# Patient Record
Sex: MALE
[De-identification: names, ages, dates, MRNs, and addresses within clinical notes are randomized per-mention and may not be internally consistent; named-entity substitution may affect disease eponyms.]

---

## 2017-03-23 ENCOUNTER — HOSPITAL ENCOUNTER (OUTPATIENT)
Dept: HOSPITAL 42 - ENDO | Age: 72
Discharge: HOME | End: 2017-03-23
Payer: SELF-PAY

## 2017-03-23 VITALS — OXYGEN SATURATION: 96 %

## 2017-03-23 VITALS
SYSTOLIC BLOOD PRESSURE: 136 MMHG | HEART RATE: 62 BPM | TEMPERATURE: 97.7 F | DIASTOLIC BLOOD PRESSURE: 74 MMHG | RESPIRATION RATE: 16 BRPM

## 2017-03-23 VITALS — BODY MASS INDEX: 34.3 KG/M2

## 2017-03-23 DIAGNOSIS — C7A.010: Primary | ICD-10-CM

## 2017-03-23 LAB
ADD MANUAL DIFF?: NO
ALBUMIN/GLOB SERPL: 1.3 {RATIO} (ref 1.1–1.8)
ALP SERPL-CCNC: 135 U/L (ref 38–133)
ALT SERPL-CCNC: 23 U/L (ref 7–56)
APTT BLD: 28.1 SECONDS (ref 23.7–30.8)
AST SERPL-CCNC: 24 U/L (ref 15–59)
BASOPHILS # BLD AUTO: 0.01 K/MM3 (ref 0–2)
BASOPHILS NFR BLD: 0.2 % (ref 0–3)
BILIRUB SERPL-MCNC: 0.8 MG/DL (ref 0.2–1.3)
BUN SERPL-MCNC: 13 MG/DL (ref 7–21)
CALCIUM SERPL-MCNC: 9.1 MG/DL (ref 8.4–10.5)
CHLORIDE SERPL-SCNC: 104 MMOL/L (ref 98–107)
CO2 SERPL-SCNC: 29 MMOL/L (ref 21–33)
EOSINOPHIL # BLD: 0.2 10*3/UL (ref 0–0.7)
EOSINOPHIL NFR BLD: 5.6 % (ref 1.5–5)
ERYTHROCYTE [DISTWIDTH] IN BLOOD BY AUTOMATED COUNT: 13.5 % (ref 11.5–14.5)
GLOBULIN SER-MCNC: 3.1 GM/DL
GLUCOSE SERPL-MCNC: 98 MG/DL (ref 70–110)
GRANULOCYTES # BLD: 2.12 10*3/UL (ref 1.4–6.5)
GRANULOCYTES NFR BLD: 51.3 % (ref 50–68)
HCT VFR BLD CALC: 46 % (ref 42–52)
INR PPP: 0.97 (ref 0.93–1.08)
LYMPHOCYTES # BLD: 1.5 10*3/UL (ref 1.2–3.4)
LYMPHOCYTES NFR BLD AUTO: 35.4 % (ref 22–35)
MCH RBC QN AUTO: 29.3 PG (ref 25–35)
MCHC RBC AUTO-ENTMCNC: 33.3 G/DL (ref 31–37)
MCV RBC AUTO: 88.1 FL (ref 80–105)
MONOCYTES # BLD AUTO: 0.3 10*3/UL (ref 0.1–0.6)
MONOCYTES NFR BLD: 7.5 % (ref 1–6)
PLATELET # BLD: 222 10^3/UL (ref 120–450)
PMV BLD AUTO: 10.2 FL (ref 7–11)
POTASSIUM SERPL-SCNC: 4.2 MMOL/L (ref 3.6–5)
PROT SERPL-MCNC: 7 G/DL (ref 5.8–8.3)
SODIUM SERPL-SCNC: 143 MMOL/L (ref 132–148)
WBC # BLD AUTO: 4.1 10^3/UL (ref 4.5–11)

## 2017-03-23 PROCEDURE — 43254 EGD ENDO MUCOSAL RESECTION: CPT

## 2017-03-23 PROCEDURE — 36415 COLL VENOUS BLD VENIPUNCTURE: CPT

## 2017-03-23 PROCEDURE — 85025 COMPLETE CBC W/AUTO DIFF WBC: CPT

## 2017-03-23 PROCEDURE — 85610 PROTHROMBIN TIME: CPT

## 2017-03-23 PROCEDURE — 85730 THROMBOPLASTIN TIME PARTIAL: CPT

## 2017-03-23 PROCEDURE — 80053 COMPREHEN METABOLIC PANEL: CPT

## 2017-03-23 PROCEDURE — 88305 TISSUE EXAM BY PATHOLOGIST: CPT

## 2017-03-23 PROCEDURE — 88342 IMHCHEM/IMCYTCHM 1ST ANTB: CPT

## 2017-05-18 ENCOUNTER — HOSPITAL ENCOUNTER (OUTPATIENT)
Dept: HOSPITAL 42 - ENDO | Age: 72
Discharge: HOME | End: 2017-05-18
Payer: SELF-PAY

## 2017-05-18 VITALS — BODY MASS INDEX: 34.3 KG/M2

## 2017-05-18 DIAGNOSIS — C7A.010: Primary | ICD-10-CM

## 2017-05-18 DIAGNOSIS — Z88.8: ICD-10-CM

## 2017-05-18 DIAGNOSIS — K44.9: ICD-10-CM

## 2017-05-18 DIAGNOSIS — Z87.81: ICD-10-CM

## 2017-05-18 DIAGNOSIS — K29.50: ICD-10-CM

## 2017-05-18 PROCEDURE — 43251 EGD REMOVE LESION SNARE: CPT

## 2017-05-18 PROCEDURE — 43254 EGD ENDO MUCOSAL RESECTION: CPT

## 2017-05-18 PROCEDURE — 43239 EGD BIOPSY SINGLE/MULTIPLE: CPT

## 2017-05-18 PROCEDURE — 88305 TISSUE EXAM BY PATHOLOGIST: CPT

## 2017-05-18 PROCEDURE — 88342 IMHCHEM/IMCYTCHM 1ST ANTB: CPT

## 2017-08-21 ENCOUNTER — HOSPITAL ENCOUNTER (OUTPATIENT)
Dept: HOSPITAL 31 - C.ENDO | Age: 72
Discharge: HOME | End: 2017-08-21
Attending: INTERNAL MEDICINE
Payer: COMMERCIAL

## 2017-08-21 VITALS — RESPIRATION RATE: 12 BRPM | TEMPERATURE: 98.7 F

## 2017-08-21 VITALS — HEART RATE: 53 BPM | OXYGEN SATURATION: 100 %

## 2017-08-21 VITALS — BODY MASS INDEX: 33.2 KG/M2

## 2017-08-21 VITALS — DIASTOLIC BLOOD PRESSURE: 78 MMHG | SYSTOLIC BLOOD PRESSURE: 131 MMHG

## 2017-08-21 DIAGNOSIS — Z85.060: ICD-10-CM

## 2017-08-21 DIAGNOSIS — Z08: Primary | ICD-10-CM

## 2017-08-21 DIAGNOSIS — I89.0: ICD-10-CM

## 2017-08-21 DIAGNOSIS — K44.9: ICD-10-CM

## 2017-08-21 DIAGNOSIS — K29.70: ICD-10-CM

## 2017-08-21 PROCEDURE — 43239 EGD BIOPSY SINGLE/MULTIPLE: CPT

## 2017-08-21 PROCEDURE — 88305 TISSUE EXAM BY PATHOLOGIST: CPT

## 2017-08-21 PROCEDURE — 88342 IMHCHEM/IMCYTCHM 1ST ANTB: CPT

## 2017-08-21 PROCEDURE — 88313 SPECIAL STAINS GROUP 2: CPT

## 2017-08-21 NOTE — CP.SDSHP
Same Day Surgery H & P





- History


Proposed Procedure: EGD


Pre-Op Diagnosis: H/o duodenal carcinoid





- Previous Medical/Surgical History


Previous Surgical History: EGD EMR





- Allergies


Allergies: 


Allergies





iodine Allergy (Severe, Verified 05/09/17 10:44)


 ANAPHYLAXIS











- Physical Exam


General Appearance: nl


Vital Signs: 


 Vital Signs











  08/21/17





  08:54


 


Temperature 97.8 F


 


Pulse Rate 56 L


 


Respiratory 19





Rate 


 


Blood Pressure 126/77


 


O2 Sat by Pulse 97





Oximetry 











Mental Status: Alert & Oriented x3


Neuro: WNL


Heart: WNL


Lungs: WNL


GI: WNL





- {Optional Preform as Required}


Abdomen: WNL





- Impression


Impression: H/o duodenal carcinoid s/p EMR, f/u egd


Pt. Evaluated Today:Candidate for Anesthesia & Procedure: Yes





- Date & Time


Date: 08/21/17


Time: 10:20





Short Stay Discharge





- Short Stay Discharge


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


Disposition: HOME/ ROUTINE

## 2018-04-02 ENCOUNTER — HOSPITAL ENCOUNTER (OUTPATIENT)
Dept: HOSPITAL 31 - C.ENDO | Age: 73
Discharge: HOME | End: 2018-04-02
Payer: COMMERCIAL

## 2018-04-02 VITALS — SYSTOLIC BLOOD PRESSURE: 128 MMHG | HEART RATE: 68 BPM | DIASTOLIC BLOOD PRESSURE: 75 MMHG | RESPIRATION RATE: 20 BRPM

## 2018-04-02 VITALS — TEMPERATURE: 97 F

## 2018-04-02 VITALS — BODY MASS INDEX: 33.2 KG/M2

## 2018-04-02 VITALS — OXYGEN SATURATION: 100 %

## 2018-04-02 DIAGNOSIS — D3A.010: Primary | ICD-10-CM

## 2018-04-02 DIAGNOSIS — D12.2: ICD-10-CM

## 2018-04-02 DIAGNOSIS — Z86.010: ICD-10-CM

## 2018-04-02 PROCEDURE — 88305 TISSUE EXAM BY PATHOLOGIST: CPT

## 2018-04-02 PROCEDURE — 45380 COLONOSCOPY AND BIOPSY: CPT

## 2018-04-23 ENCOUNTER — HOSPITAL ENCOUNTER (OUTPATIENT)
Dept: HOSPITAL 31 - C.ENDO | Age: 73
Discharge: HOME | End: 2018-04-23
Attending: INTERNAL MEDICINE
Payer: COMMERCIAL

## 2018-04-23 VITALS
HEART RATE: 75 BPM | OXYGEN SATURATION: 98 % | RESPIRATION RATE: 16 BRPM | DIASTOLIC BLOOD PRESSURE: 83 MMHG | SYSTOLIC BLOOD PRESSURE: 120 MMHG

## 2018-04-23 VITALS — BODY MASS INDEX: 33.2 KG/M2

## 2018-04-23 VITALS — TEMPERATURE: 97 F

## 2018-04-23 DIAGNOSIS — D12.3: ICD-10-CM

## 2018-04-23 DIAGNOSIS — D12.2: ICD-10-CM

## 2018-04-23 DIAGNOSIS — D17.5: ICD-10-CM

## 2018-04-23 DIAGNOSIS — K64.8: ICD-10-CM

## 2018-04-23 DIAGNOSIS — Z86.010: Primary | ICD-10-CM

## 2018-04-23 PROCEDURE — 45385 COLONOSCOPY W/LESION REMOVAL: CPT

## 2018-04-23 PROCEDURE — 45380 COLONOSCOPY AND BIOPSY: CPT

## 2018-04-23 PROCEDURE — 88305 TISSUE EXAM BY PATHOLOGIST: CPT

## 2018-04-23 PROCEDURE — 45381 COLONOSCOPY SUBMUCOUS NJX: CPT

## 2018-05-31 ENCOUNTER — HOSPITAL ENCOUNTER (INPATIENT)
Dept: HOSPITAL 31 - C.SDS | Age: 73
LOS: 18 days | Discharge: HOME | DRG: 329 | End: 2018-06-18
Attending: SURGERY | Admitting: SURGERY
Payer: MEDICAID

## 2018-05-31 VITALS — BODY MASS INDEX: 33.2 KG/M2

## 2018-05-31 DIAGNOSIS — K63.0: ICD-10-CM

## 2018-05-31 DIAGNOSIS — K55.1: ICD-10-CM

## 2018-05-31 DIAGNOSIS — D12.2: Primary | ICD-10-CM

## 2018-05-31 DIAGNOSIS — N17.0: ICD-10-CM

## 2018-05-31 DIAGNOSIS — R57.8: ICD-10-CM

## 2018-05-31 DIAGNOSIS — E87.0: ICD-10-CM
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
## 2018-05-31 DIAGNOSIS — K42.9: ICD-10-CM

## 2018-05-31 DIAGNOSIS — E66.9: ICD-10-CM

## 2018-05-31 DIAGNOSIS — K91.30: ICD-10-CM

## 2018-05-31 DIAGNOSIS — E86.0: ICD-10-CM

## 2018-05-31 DIAGNOSIS — I95.81: ICD-10-CM

## 2018-05-31 DIAGNOSIS — K21.9: ICD-10-CM

## 2018-05-31 DIAGNOSIS — G47.33: ICD-10-CM

## 2018-05-31 DIAGNOSIS — K63.2: ICD-10-CM

## 2018-05-31 PROCEDURE — 0DQV4ZZ REPAIR MESENTERY, PERCUTANEOUS ENDOSCOPIC APPROACH: ICD-10-PCS | Performed by: SURGERY

## 2018-05-31 PROCEDURE — 0W9F40Z DRAINAGE OF ABDOMINAL WALL WITH DRAINAGE DEVICE, PERCUTANEOUS ENDOSCOPIC APPROACH: ICD-10-PCS | Performed by: SURGERY

## 2018-05-31 PROCEDURE — 3E0336Z INTRODUCTION OF NUTRITIONAL SUBSTANCE INTO PERIPHERAL VEIN, PERCUTANEOUS APPROACH: ICD-10-PCS | Performed by: SURGERY

## 2018-05-31 PROCEDURE — 0DTF4ZZ RESECTION OF RIGHT LARGE INTESTINE, PERCUTANEOUS ENDOSCOPIC APPROACH: ICD-10-PCS | Performed by: SURGERY

## 2018-05-31 RX ADMIN — BUPIVACAINE HYDROCHLORIDE ONE ML: 2.5 INJECTION, SOLUTION EPIDURAL; INFILTRATION; INTRACAUDAL; PERINEURAL at 08:09

## 2018-05-31 RX ADMIN — BUPIVACAINE HYDROCHLORIDE ONE ML: 2.5 INJECTION, SOLUTION EPIDURAL; INFILTRATION; INTRACAUDAL; PERINEURAL at 12:56

## 2018-05-31 RX ADMIN — HYDROMORPHONE HYDROCHLORIDE PRN MG: 1 INJECTION, SOLUTION INTRAMUSCULAR; INTRAVENOUS; SUBCUTANEOUS at 13:48

## 2018-05-31 RX ADMIN — HYDROMORPHONE HYDROCHLORIDE PRN MG: 1 INJECTION, SOLUTION INTRAMUSCULAR; INTRAVENOUS; SUBCUTANEOUS at 17:12

## 2018-05-31 RX ADMIN — WATER SCH MLS: 1 INJECTION INTRAMUSCULAR; INTRAVENOUS; SUBCUTANEOUS at 17:10

## 2018-05-31 RX ADMIN — HYDROMORPHONE HYDROCHLORIDE PRN MG: 1 INJECTION, SOLUTION INTRAMUSCULAR; INTRAVENOUS; SUBCUTANEOUS at 15:55

## 2018-05-31 RX ADMIN — HYDROMORPHONE HYDROCHLORIDE PRN MG: 1 INJECTION, SOLUTION INTRAMUSCULAR; INTRAVENOUS; SUBCUTANEOUS at 14:50

## 2018-05-31 NOTE — PCM.SURG1
Surgeon's Initial Post Op Note





- Surgeon's Notes


Surgeon: Dr. Villalobos


Assistant: Dr. Medina PGY3,  PGY1, Matilda OMS3


Type of Anesthesia: General Endo


Pre-Operative Diagnosis: Incomplete polypectomy of right colon. Tubular Adenoma


Operative Findings: Tatooed mass on right colon seen on specimen. Duodenum 

visualized during dissection. Primary anastamosis with ileum and transverse 

colon with no leak detected. for details see op note


Post-Operative Diagnosis: as above


Operation Performed: Laparoscopic Right gissel-colectomy


Specimen/Specimens Removed: Right colon


Estimated Blood Loss: EBL {In ML}: 150


Blood Products Given: N/A


Drains Used: Tim


Post-Op Condition: Fair


Date of Surgery/Procedure: 05/31/18


Time of Surgery/Procedure: 08:00

## 2018-06-01 LAB
BASOPHILS # BLD AUTO: 0 K/UL (ref 0–0.2)
BASOPHILS NFR BLD: 0.1 % (ref 0–2)
BUN SERPL-MCNC: 11 MG/DL (ref 9–20)
CALCIUM SERPL-MCNC: 7.8 MG/DL (ref 8.6–10.4)
EOSINOPHIL # BLD AUTO: 0 K/UL (ref 0–0.7)
EOSINOPHIL NFR BLD: 0 % (ref 0–4)
ERYTHROCYTE [DISTWIDTH] IN BLOOD BY AUTOMATED COUNT: 14.4 % (ref 11.5–14.5)
GFR NON-AFRICAN AMERICAN: > 60
HGB BLD-MCNC: 14.2 G/DL (ref 12–18)
LYMPHOCYTE: 8 % (ref 20–40)
LYMPHOCYTES # BLD AUTO: 0.9 K/UL (ref 1–4.3)
LYMPHOCYTES NFR BLD AUTO: 9.1 % (ref 20–40)
MCH RBC QN AUTO: 29.2 PG (ref 27–31)
MCHC RBC AUTO-ENTMCNC: 34.4 G/DL (ref 33–37)
MCV RBC AUTO: 85 FL (ref 80–94)
MONOCYTE: 5 % (ref 0–10)
MONOCYTES # BLD: 0.7 K/UL (ref 0–0.8)
MONOCYTES NFR BLD: 6.6 % (ref 0–10)
NEUTROPHILS # BLD: 8.5 K/UL (ref 1.8–7)
NEUTROPHILS NFR BLD AUTO: 84.2 % (ref 50–75)
NEUTROPHILS NFR BLD AUTO: 85 % (ref 50–75)
NEUTS BAND NFR BLD: 2 % (ref 0–2)
NRBC BLD AUTO-RTO: 0.1 % (ref 0–2)
PLATELET # BLD EST: NORMAL 10*3/UL
PLATELET # BLD: 170 K/UL (ref 130–400)
PMV BLD AUTO: 8.9 FL (ref 7.2–11.7)
RBC # BLD AUTO: 4.87 MIL/UL (ref 4.4–5.9)
TOTAL CELLS COUNTED BLD: 100
WBC # BLD AUTO: 10.1 K/UL (ref 4.8–10.8)

## 2018-06-01 RX ADMIN — WATER SCH MLS/HR: 1 INJECTION INTRAMUSCULAR; INTRAVENOUS; SUBCUTANEOUS at 08:44

## 2018-06-01 RX ADMIN — CEFAZOLIN SODIUM SCH MLS/HR: 2 SOLUTION INTRAVENOUS at 00:04

## 2018-06-01 RX ADMIN — CEFAZOLIN SODIUM SCH MLS/HR: 2 SOLUTION INTRAVENOUS at 16:33

## 2018-06-01 RX ADMIN — WATER SCH MLS/HR: 1 INJECTION INTRAMUSCULAR; INTRAVENOUS; SUBCUTANEOUS at 00:49

## 2018-06-01 RX ADMIN — POTASSIUM CHLORIDE, DEXTROSE MONOHYDRATE AND SODIUM CHLORIDE SCH: 150; 5; 450 INJECTION, SOLUTION INTRAVENOUS at 17:01

## 2018-06-01 RX ADMIN — POTASSIUM CHLORIDE, DEXTROSE MONOHYDRATE AND SODIUM CHLORIDE SCH MLS/HR: 150; 5; 450 INJECTION, SOLUTION INTRAVENOUS at 21:10

## 2018-06-01 RX ADMIN — CEFAZOLIN SODIUM SCH MLS/HR: 2 SOLUTION INTRAVENOUS at 06:20

## 2018-06-01 RX ADMIN — POTASSIUM CHLORIDE, DEXTROSE MONOHYDRATE AND SODIUM CHLORIDE SCH MLS/HR: 150; 5; 450 INJECTION, SOLUTION INTRAVENOUS at 10:11

## 2018-06-01 NOTE — CP.PCM.PN
<Tiburcio Dietrich D - Last Filed: 06/01/18 09:31>





Subjective





- Date & Time of Evaluation


Date of Evaluation: 06/01/18


Time of Evaluation: 09:31





- Subjective


Subjective: 





SURGERY NOTE FOR DR. EVANS





72M seen and examined at bedside. Patient states pain is controlled, admits to 

mild nausea, denies vomiting denies fevers/chills. He denies flatus/denies BM. 





Objective





- Vital Signs/Intake and Output


Vital Signs (last 24 hours): 


 











Temp Pulse Resp BP Pulse Ox


 


 99.4 F   108 H  20   149/83   93 L


 


 06/01/18 07:03  06/01/18 07:03  06/01/18 07:03  06/01/18 07:03  06/01/18 07:03








Intake and Output: 


 











 06/01/18 06/01/18





 06:59 18:59


 


Output Total 715 


 


Balance -715 














- Medications


Medications: 


 Current Medications





Acetaminophen (Tylenol 325mg Tab)  650 mg PO Q6 PRN


   PRN Reason: Fever >100.4 F


Albuterol Sulfate (Albuterol 0.083% Inhal Sol (2.5 Mg/3 Ml) Ud)  2.5 mg INH 

ONCE PRN


   PRN Reason: Wheezing


Heparin Sodium (Porcine) (Heparin)  5,000 units SC Q8 [facility]


   Last Admin: 06/01/18 09:07 Dose:  5,000 units


Hydromorphone/Sodium Chloride (Dilaudid Pca)  6 mg IV Q4H PRN; Protocol


   PRN Reason: Pain, moderate (4-7)


   Last Admin: 06/01/18 06:11 Dose:  6 mg


Potassium Chloride/Dextrose/Sod Cl (Potassium Chl 20 Meq In D5-1/2ns)  1,000 

mls @ 125 mls/hr IV .Q8H [facility]


Lactated Ringer's (Lactated Ringer's)  1,000 mls @ 125 mls/hr IV .Q8H [facility]


   Last Admin: 06/01/18 05:30 Dose:  125 mls/hr


Cefazolin Sodium/Dextrose (Ancef Iv 2 Gm Duplex)  2 gm in 50 mls @ 100 mls/hr 

IVPB Q8H [facility]


   PRN Reason: Protocol


   Stop: 06/01/18 15:29


   Last Admin: 06/01/18 06:20 Dose:  100 mls/hr


Lidocaine (Lidoderm)  1 ea TD DAILY [facility]


   Last Admin: 06/01/18 09:05 Dose:  1 ea


Ondansetron HCl (Zofran Inj)  4 mg IV Q4 PRN


   PRN Reason: Nausea/Vomiting


   Last Admin: 06/01/18 09:15 Dose:  4 mg











- Labs


Labs: 


 





 06/01/18 06:51 





 06/01/18 06:51 











- Constitutional


Appears: Well, Non-toxic, No Acute Distress





- Respiratory Exam


Respiratory Exam: Clear to Ausculation Bilateral, NORMAL BREATHING PATTERN





- Cardiovascular Exam


Cardiovascular Exam: REGULAR RHYTHM, +S1, +S2





- GI/Abdominal Exam


GI & Abdominal Exam: Distended, Soft, Tenderness.  absent: Firm, Guarding, Rigid

, Rebound


Additional comments: 





incisions CDI


Drain 135cc since OR of serosanguinous fluid





- Extremities Exam


Extremities Exam: absent: Pedal Edema, Tenderness





- Neurological Exam


Neurological Exam: Alert, Awake





- Psychiatric Exam


Psychiatric exam: Normal Affect, Normal Mood





- Skin


Skin Exam: Dry, Intact, Normal Color, Warm





Assessment and Plan





- Assessment and Plan (Free Text)


Assessment: 





72M s/p Laparoscopic Right Hemicolectomy POD1 for tubular adenoma which was 

unable to be removed endoscopically POD#1


Plan: 





- NPO


- Pain control, keep PCA, Lidoderm around incisions


- Monitor Drain output


- DC ABX in PM


- Nebulizer treatments


- Encourage ambulation


- DC newby after ambulation


- SCD/HeparinSC





Discussed with Dr. Wilfredo Dietrich, PGY2





<Jas Evans - Last Filed: 06/01/18 10:26>





Objective





- Vital Signs/Intake and Output


Vital Signs (last 24 hours): 


 











Temp Pulse Resp BP Pulse Ox


 


 99.4 F   108 H  20   149/83   93 L


 


 06/01/18 07:03  06/01/18 07:03  06/01/18 07:03  06/01/18 07:03  06/01/18 07:03








Intake and Output: 


 











 06/01/18 06/01/18





 06:59 18:59


 


Output Total 715 


 


Balance -715 














- Medications


Medications: 


 Current Medications





Acetaminophen (Tylenol 325mg Tab)  650 mg PO Q6 PRN


   PRN Reason: Fever >100.4 F


Albuterol Sulfate (Albuterol 0.083% Inhal Sol (2.5 Mg/3 Ml) Ud)  2.5 mg INH 

ONCE PRN


   PRN Reason: Wheezing


Heparin Sodium (Porcine) (Heparin)  5,000 units SC Q8 [facility]


   Last Admin: 06/01/18 09:07 Dose:  5,000 units


Hydromorphone/Sodium Chloride (Dilaudid Pca)  6 mg IV Q4H PRN; Protocol


   PRN Reason: Pain, moderate (4-7)


   Last Admin: 06/01/18 06:11 Dose:  6 mg


Potassium Chloride/Dextrose/Sod Cl (Potassium Chl 20 Meq In D5-1/2ns)  1,000 

mls @ 125 mls/hr IV .Q8H KAT[facility]


Lactated Ringer's (Lactated Ringer's)  1,000 mls @ 125 mls/hr IV .Q8H [facility]


   Last Admin: 06/01/18 05:30 Dose:  125 mls/hr


Cefazolin Sodium/Dextrose (Ancef Iv 2 Gm Duplex)  2 gm in 50 mls @ 100 mls/hr 

IVPB Q8H KAT[facility]


   PRN Reason: Protocol


   Stop: 06/01/18 15:29


   Last Admin: 06/01/18 06:20 Dose:  100 mls/hr


Lidocaine (Lidoderm)  1 ea TD DAILY [facility]


   Last Admin: 06/01/18 09:05 Dose:  1 ea


Ondansetron HCl (Zofran Inj)  4 mg IV Q4 PRN


   PRN Reason: Nausea/Vomiting


   Last Admin: 06/01/18 09:15 Dose:  4 mg











- Labs


Labs: 


 





 06/01/18 06:51 





 06/01/18 06:51 











Assessment and Plan





- Assessment and Plan (Free Text)


Plan: 


Patient seen and examined at bedside with residents staff and agree with above. 

Would expect post-op ileus due to length of operation and bowel manipulation. 

Cont. NPO. NGT if continued nausea or vomiting. D/c newby later today when 

ambulating. Pain control. DVT prophylaxis HSQ and SCD. Periop Abx to finish 

today. IS and breathing treatments PRN.

## 2018-06-02 LAB
ALBUMIN SERPL-MCNC: 2.9 G/DL (ref 3.5–5)
ALBUMIN SERPL-MCNC: 3.2 G/DL (ref 3.5–5)
ALBUMIN/GLOB SERPL: 1.1 {RATIO} (ref 1–2.1)
ALBUMIN/GLOB SERPL: 1.1 {RATIO} (ref 1–2.1)
ALT SERPL-CCNC: 16 U/L (ref 21–72)
ALT SERPL-CCNC: 19 U/L (ref 21–72)
AST SERPL-CCNC: 23 U/L (ref 17–59)
AST SERPL-CCNC: 26 U/L (ref 17–59)
BASOPHILS # BLD AUTO: 0 K/UL (ref 0–0.2)
BASOPHILS # BLD AUTO: 0 K/UL (ref 0–0.2)
BASOPHILS NFR BLD: 0.2 % (ref 0–2)
BASOPHILS NFR BLD: 0.3 % (ref 0–2)
BUN SERPL-MCNC: 10 MG/DL (ref 9–20)
BUN SERPL-MCNC: 10 MG/DL (ref 9–20)
CALCIUM SERPL-MCNC: 7.9 MG/DL (ref 8.6–10.4)
CALCIUM SERPL-MCNC: 8.1 MG/DL (ref 8.6–10.4)
CK MB SERPL-MCNC: 0.95 NG/ML (ref 0–3.38)
CK MB SERPL-MCNC: 1.53 NG/ML (ref 0–3.38)
EOSINOPHIL # BLD AUTO: 0.1 K/UL (ref 0–0.7)
EOSINOPHIL # BLD AUTO: 0.1 K/UL (ref 0–0.7)
EOSINOPHIL NFR BLD: 0.7 % (ref 0–4)
EOSINOPHIL NFR BLD: 0.9 % (ref 0–4)
ERYTHROCYTE [DISTWIDTH] IN BLOOD BY AUTOMATED COUNT: 14.3 % (ref 11.5–14.5)
ERYTHROCYTE [DISTWIDTH] IN BLOOD BY AUTOMATED COUNT: 14.7 % (ref 11.5–14.5)
GFR NON-AFRICAN AMERICAN: > 60
GFR NON-AFRICAN AMERICAN: > 60
HGB BLD-MCNC: 13.7 G/DL (ref 12–18)
HGB BLD-MCNC: 14.4 G/DL (ref 12–18)
LYMPHOCYTE: 13 % (ref 20–40)
LYMPHOCYTE: 4 % (ref 20–40)
LYMPHOCYTES # BLD AUTO: 0.5 K/UL (ref 1–4.3)
LYMPHOCYTES # BLD AUTO: 1 K/UL (ref 1–4.3)
LYMPHOCYTES NFR BLD AUTO: 6 % (ref 20–40)
LYMPHOCYTES NFR BLD AUTO: 9.7 % (ref 20–40)
MCH RBC QN AUTO: 29.3 PG (ref 27–31)
MCH RBC QN AUTO: 29.9 PG (ref 27–31)
MCHC RBC AUTO-ENTMCNC: 33.9 G/DL (ref 33–37)
MCHC RBC AUTO-ENTMCNC: 34.5 G/DL (ref 33–37)
MCV RBC AUTO: 86.5 FL (ref 80–94)
MCV RBC AUTO: 86.6 FL (ref 80–94)
MONOCYTE: 3 % (ref 0–10)
MONOCYTE: 4 % (ref 0–10)
MONOCYTES # BLD: 0.5 K/UL (ref 0–0.8)
MONOCYTES # BLD: 0.5 K/UL (ref 0–0.8)
MONOCYTES NFR BLD: 5.1 % (ref 0–10)
MONOCYTES NFR BLD: 5.2 % (ref 0–10)
NEUTROPHILS # BLD: 7.8 K/UL (ref 1.8–7)
NEUTROPHILS # BLD: 8.4 K/UL (ref 1.8–7)
NEUTROPHILS NFR BLD AUTO: 83.9 % (ref 50–75)
NEUTROPHILS NFR BLD AUTO: 84 % (ref 50–75)
NEUTROPHILS NFR BLD AUTO: 88 % (ref 50–75)
NEUTROPHILS NFR BLD AUTO: 92 % (ref 50–75)
NRBC BLD AUTO-RTO: 0 % (ref 0–2)
NRBC BLD AUTO-RTO: 0 % (ref 0–2)
PLATELET # BLD EST: NORMAL 10*3/UL
PLATELET # BLD EST: NORMAL 10*3/UL
PLATELET # BLD: 177 K/UL (ref 130–400)
PLATELET # BLD: 198 K/UL (ref 130–400)
PMV BLD AUTO: 8.8 FL (ref 7.2–11.7)
PMV BLD AUTO: 9.1 FL (ref 7.2–11.7)
RBC # BLD AUTO: 4.58 MIL/UL (ref 4.4–5.9)
RBC # BLD AUTO: 4.9 MIL/UL (ref 4.4–5.9)
RBC MORPH BLD: NORMAL
TOTAL CELLS COUNTED BLD: 100
TOTAL CELLS COUNTED BLD: 100
WBC # BLD AUTO: 10 K/UL (ref 4.8–10.8)
WBC # BLD AUTO: 8.9 K/UL (ref 4.8–10.8)

## 2018-06-02 RX ADMIN — MAGNESIUM SULFATE IN DEXTROSE SCH MLS/HR: 10 INJECTION, SOLUTION INTRAVENOUS at 00:30

## 2018-06-02 RX ADMIN — POTASSIUM CHLORIDE, DEXTROSE MONOHYDRATE AND SODIUM CHLORIDE SCH: 150; 5; 450 INJECTION, SOLUTION INTRAVENOUS at 09:08

## 2018-06-02 RX ADMIN — POTASSIUM CHLORIDE, DEXTROSE MONOHYDRATE AND SODIUM CHLORIDE SCH MLS/HR: 150; 5; 450 INJECTION, SOLUTION INTRAVENOUS at 08:13

## 2018-06-02 RX ADMIN — MAGNESIUM SULFATE IN DEXTROSE SCH MLS/HR: 10 INJECTION, SOLUTION INTRAVENOUS at 00:00

## 2018-06-02 RX ADMIN — POTASSIUM CHLORIDE, DEXTROSE MONOHYDRATE AND SODIUM CHLORIDE SCH: 150; 5; 450 INJECTION, SOLUTION INTRAVENOUS at 00:10

## 2018-06-02 NOTE — RAD
Chest x-ray single frontal view 



History: Tachycardia. 



Comparison: 05/22/2018 



Findings: 



Biapical pleural thickening with upper lobe granulomatous changes. 



Diffuse increased interstitial lung markings with some scattered 

nodular densities throughout both lungs. 



Prominent linear consolidative changes at both lung bases which may 

represent atelectasis and or infiltrate. 



Enlarged ectatic aorta. 



Cardiomegaly. 



Degenerative changes in the spine and shoulders. 



Impression: 



Biapical pleural thickening with upper lobe granulomatous changes. 



Diffuse increased interstitial lung markings with some scattered 

nodular densities throughout both lungs. 



Prominent linear consolidative changes at both lung bases which may 

represent atelectasis and or infiltrate. 



Enlarged ectatic aorta. 



Cardiomegaly.

## 2018-06-02 NOTE — CP.PCM.CON
History of Present Illness





- History of Present Illness


History of Present Illness: 





72-year-old  male postoperatively, hemicolectomy, developed rapid heart 

rate and cardiology consult was requested.


On further review of the chart patient was cleared by the medical clinic as 

patient has no cardiac history.  EKG preop shows some Q waves in inferior leads 

with sinus bradycardia of 56.  No investigation was done concerning low heart 

rate.


Postoperatively patient heart rate is was in 120 sinus tachycardia with stable 

blood pressure.  EKG showed no change from previous one with small Q's in 

inferior leads with sinus tachycardia.


Patient has no history of diabetes coronary artery disease or MI in the past.


Physical examination patient is not in acute distress


Heart S1-S2 normal.  Currently the heart rate is normal after previously given 

IV beta-blocker.  There is no murmurs or gallops.


Lungs are clear to auscultation percussion


Abdomen is postop with slight distention


Extremity is no edema.


Another repeat EKG was done incorrectly with reversal of leads.  Cardiac 

enzymes 2 are negative.


Plan


Continue observation on telemetry.  If the heart rate is more than 120 can give 

when necessary IV Lopressor if the blood pressure is more than 110 systolic.


Currently patient does not need any further  cardiac intervention





Past Patient History





- Past Medical History & Family History


Past Medical History?: Yes





- Past Social History


Smoking Status: Never Smoked





- CARDIAC


Hx Cardiac Disorders: Yes





- PULMONARY


Hx Respiratory Disorders: Yes


Hx Sleep Apnea: Yes (POSITIVE STUDY, DOES NOT HAVE C PAP MACHINE)





- NEUROLOGICAL


Hx Neurological Disorder: No





- HEENT


Hx HEENT Problems: No





- RENAL


Hx Chronic Kidney Disease: No





- ENDOCRINE/METABOLIC


Hx Endocrine Disorders: No





- HEMATOLOGICAL/ONCOLOGICAL


Hx Blood Disorders: No





- INTEGUMENTARY


Hx Dermatological Problems: No





- MUSCULOSKELETAL/RHEUMATOLOGICAL


Hx Musculoskeletal Disorders: No


Hx Falls: No





- GASTROINTESTINAL


Hx Gastrointestinal Disorders: Yes


Hx Gastritis: Yes


Other/Comment: HX DUODENAL CARCINOID





- GENITOURINARY/GYNECOLOGICAL


Hx Genitourinary Disorders: No





- PSYCHIATRIC


Hx Psychophysiologic Disorder: No


Hx Substance Use: No





- SURGICAL HISTORY


Hx Surgeries: Yes


Hx Orthopedic Surgery: Yes (ORIF LEFT ELBOW W PLATE PLACEMENT)


Other/Comment: HX: COLON POLYPECTOMY





- ANESTHESIA


Hx Anesthesia: Yes


Hx Anesthesia Reactions: No


Hx Malignant Hyperthermia: No


Has any member of the family had a problem w/ anesthesia?: No





Meds


Allergies/Adverse Reactions: 


 Allergies











Allergy/AdvReac Type Severity Reaction Status Date / Time


 


iodine Allergy Severe ANAPHYLAXIS Verified 04/23/18 09:17














- Medications


Medications: 


 Current Medications





Acetaminophen (Tylenol 325mg Tab)  650 mg PO Q6 PRN


   PRN Reason: Fever >100.4 F


Albuterol Sulfate (Albuterol 0.083% Inhal Sol (2.5 Mg/3 Ml) Ud)  2.5 mg INH 

ONCE PRN


   PRN Reason: Wheezing


Famotidine (Pepcid)  20 mg IVP Q12 [facility]


   Last Admin: 06/02/18 09:24 Dose:  20 mg


Heparin Sodium (Porcine) (Heparin)  5,000 units SC Q8 [facility]


   Last Admin: 06/02/18 13:40 Dose:  5,000 units


Hydromorphone/Sodium Chloride (Dilaudid Pca)  6 mg IV Q4H PRN; Protocol


   PRN Reason: Pain, moderate (4-7)


   Last Admin: 06/02/18 09:25 Dose:  6 mg


Potassium Chloride/Dextrose/Sod Cl (Potassium Chl 20 Meq In D5-1/2ns)  1,000 

mls @ 100 mls/hr IV .Q10H [facility]


   Last Admin: 06/02/18 09:08 Dose:  Not Given


Lidocaine (Lidoderm)  1 ea TD DAILY [facility]


   Last Admin: 06/02/18 09:26 Dose:  Not Given


Ondansetron HCl (Zofran Inj)  4 mg IV Q4 PRN


   PRN Reason: Nausea/Vomiting


   Last Admin: 06/01/18 17:38 Dose:  4 mg











Results





- Vital Signs


Recent Vital Signs: 


 Last Vital Signs











Temp  98.6 F   06/02/18 10:15


 


Pulse  90   06/02/18 12:00


 


Resp  20   06/02/18 08:27


 


BP  130/79   06/02/18 08:27


 


Pulse Ox  97   06/02/18 08:27














- Labs


Result Diagrams: 


 06/03/18 03:04





 06/03/18 03:04


Labs: 


 Laboratory Results - last 24 hr











  06/02/18 06/02/18





  01:06 08:10


 


WBC   8.9


 


RBC   4.58


 


Hgb   13.7


 


Hct   39.6


 


MCV   86.5


 


MCH   29.9


 


MCHC   34.5


 


RDW   14.3


 


Plt Count   177


 


MPV   9.1


 


Neut % (Auto)   88.0 H


 


Lymph % (Auto)   6.0 L


 


Mono % (Auto)   5.1


 


Eos % (Auto)   0.7


 


Baso % (Auto)   0.2


 


Neut # (Auto)   7.8 H


 


Lymph # (Auto)   0.5 L


 


Mono # (Auto)   0.5


 


Eos # (Auto)   0.1


 


Baso # (Auto)   0.0


 


Neutrophils % (Manual)   92 H


 


Lymphocytes % (Manual)   4 L


 


Monocytes % (Manual)   4


 


Platelet Estimate   Normal


 


RBC Morphology   Normal


 


Sodium  138 


 


Potassium  4.0 


 


Chloride  102 


 


Carbon Dioxide  29 


 


Anion Gap  12 


 


BUN  10 


 


Creatinine  0.9 


 


Est GFR ( Amer)  > 60 


 


Est GFR (Non-Af Amer)  > 60 


 


Random Glucose  144 H 


 


Calcium  7.9 L 


 


Phosphorus  1.5 L 


 


Magnesium  2.5 H 


 


Total Bilirubin  1.2 


 


AST  26 


 


ALT  16 L D 


 


Alkaline Phosphatase  94 


 


CK-MB (Mass)  1.53 


 


Troponin I  < 0.0120 


 


Total Protein  5.6 L 


 


Albumin  2.9 L D 


 


Globulin  2.7 


 


Albumin/Globulin Ratio  1.1

## 2018-06-02 NOTE — CP.PCM.PN
Subjective





- Date & Time of Evaluation


Date of Evaluation: 06/02/18


Time of Evaluation: 00:34





- Subjective


Subjective: 


General Surgery Progress Note for Dr. Villalobos








12:00AM This 72M was seen and examined. Nurse Denied any acute events. Reports 

urine output improved. Patient reports pain under control increased incentive 

spirometery from 500 to 750. Patient saturating 98 on 3L when cannula removed 

saturation decreased to 93% so NC was continued. Of note the patients heart 

rate was elevated. EKG was done and compared with previous EKG. EKG on 5/22 

exhibited sinus yeyo @ 56 with inferior infarct of undermined age, EKG 

overnight showed sinus tachycardia with inferior infarct of undetermined age. 

Cardiac enzymes where ordered and cardiology was consulted. Cardiac enzymes 

were negative. 





6:00AM PT seen and examined this AM at bedside, He was awake sitting up, 

pulling 1000 on the incentive spirometer. He reports nausea and belching, 

denies vomiting, flatus, BM, chest pain or SOB. Discussed goals for the day of 

increasing ambulation. 





Objective





- Vital Signs/Intake and Output


Vital Signs (last 24 hours): 


 











Temp Pulse Resp BP Pulse Ox


 


 98.4 F   100 H  22   148/78   91 L


 


 06/01/18 15:57  06/01/18 18:43  06/01/18 15:57  06/01/18 15:57  06/01/18 18:43








Intake and Output: 


 











 06/01/18 06/02/18





 18:59 06:59


 


Intake Total 1150 750


 


Output Total 50 715


 


Balance 1100 35














- Medications


Medications: 


 Current Medications





Acetaminophen (Tylenol 325mg Tab)  650 mg PO Q6 PRN


   PRN Reason: Fever >100.4 F


Albuterol Sulfate (Albuterol 0.083% Inhal Sol (2.5 Mg/3 Ml) Ud)  2.5 mg INH 

ONCE PRN


   PRN Reason: Wheezing


Heparin Sodium (Porcine) (Heparin)  5,000 units SC Q8 [facility]


   Last Admin: 06/01/18 21:10 Dose:  5,000 units


Hydromorphone/Sodium Chloride (Dilaudid Pca)  6 mg IV Q4H PRN; Protocol


   PRN Reason: Pain, moderate (4-7)


Potassium Chloride/Dextrose/Sod Cl (Potassium Chl 20 Meq In D5-1/2ns)  1,000 

mls @ 100 mls/hr IV .Q10H [facility]


Lidocaine (Lidoderm)  1 ea TD DAILY [facility]


   Last Admin: 06/01/18 09:05 Dose:  1 ea


Ondansetron HCl (Zofran Inj)  4 mg IV Q4 PRN


   PRN Reason: Nausea/Vomiting


   Last Admin: 06/01/18 17:38 Dose:  4 mg











- Labs


Labs: 


 





 06/01/18 06:51 





 06/01/18 06:51 











- Constitutional


Appears: Non-toxic, No Acute Distress





- Head Exam


Head Exam: ATRAUMATIC, NORMOCEPHALIC





- Eye Exam


Eye Exam: EOMI, Normal appearance





- Respiratory Exam


Respiratory Exam: NORMAL BREATHING PATTERN


Additional comments: 


3L NC








- Cardiovascular Exam


Cardiovascular Exam: Tachycardia (HR 95), +S1, +S2





- GI/Abdominal Exam


Additional comments: 


Full, appropriately tender abdomen with dressings clean dry and intact Tim 

drain output 70cc serosang over 12 hours 135cc serosang over 24 hours








- Neurological Exam


Neurological Exam: Alert, Awake





- Psychiatric Exam


Psychiatric exam: Normal Affect, Normal Mood





- Skin


Skin Exam: Dry, Intact





Assessment and Plan





- Assessment and Plan (Free Text)


Assessment: 


72M POD#2 s/p laparoscopic right hemicolectomy for Incomplete polypectomy of 

right colon for tubular Adenoma





HR , /81 (Baseline)  


Dilaudid PCA received 3.4mg over the last 7 hours


Urine output 100cc per hour >12 hours


Tim: 135 serosang (65am/70pm)


Plan: 


Continue telemetry


Follow up cardiology 


Continue NPO


Continue Pepcid


Monitor Outputs


Lidoderm patch 


PCA for pain


D5 1/2 20K @100


Replete Phos


Out of bed


Ambulate


Incentive spriometer


SCDs


Further recs per Dr. Wilfredo Sigala PGY2

## 2018-06-02 NOTE — OP
PROCEDURE DATE:  05/31/2018



PREOPERATIVE DIAGNOSES:  

1. Adenomatous polyps of colon

2. Status post colonoscopy with incomplete polypectomy

3. Obesity with body mass index between 30 and 35

4. Umbilical hernia.



POSTOPERATIVE DIAGNOSES:  

1. Adenomatous polyps of colon

2. Status post colonoscopy with incomplete polypectomy

3. Obesity with body mass index between 30 and 35

4. Umbilical hernia



PROCEDURE PERFORMED:  

1. Laparoscopic right hemicolectomy.

2. Umbilical hernia repair (as part of closure)



OPERATING SURGEON:  Jas Villalobos MD



ASSISTANT SURGEON:  __Saritha Medina, PGY-4 resident; Everett Méndez, PGY-2

resident.



OPERATIVE FINDINGS:  Significant visceral adiposity of mesentery and omentum. 

Normal-appearing large intestine.  No evidence of peritoneal or hepatic

nodules.  Inked margin of right colon is visible within the superior

portion of the ascending colon.  Please note that due to the patient's

weight, obesity and BMI of 33, and the presence of significant amount of

visceral adiposity dissection and identification of tissue planes and anatomic 
land marks was very difficult. It added about 60

minutes of additional work over what is considered normal for this

procedure, and significant amount of time was taken to meticulously dissect

out and define anatomic landmarks, especially within the mesentery while

doing both the medial to lateral and also the lateral dissections of the

right colon.



SPECIMENS REMOVED:  Right colon and terminal ileum.  Additional 2-cm segment of 
terminal ileum 

ESTIMATED BLOOD LOSS:  150 mL.



ANESTHESIA:  General anesthesia as well as 0.25% Marcaine local anesthesia.



INDICATIONS FOR OPERATION:  This is a 72-year-old male who had a recent

colonoscopy done about two months ago and was found to have a 3-cm and a 2-cm

polyp within the ascending colon.  Colonoscopic removal was attempted multiple 
times by the GI doctor, and there

was an incomplete piecemeal removal of the more distal lesion in the

ascending colon.  Because of the difficulty of the procedure, they did not

attempt to remove the more proximal lesion closer to the cecum.  The

patient's pathology came back as adenomatous tubulovillous adenoma.  He was

taken to the operating room for a right hemicolectomy to rule out cancer.



DESCRIPTION OF THE OPERATION:  Informed consent was obtained prior to

taking the patient back to the operating room.  We discussed at length as

well as in clinic with the patient and his wife the risks of the operation

including but not limited to retroperitoneal injury, ureteral injury,

possible need for colostomy, injury to intra-abdominal organs, bleeding,

infection, and VTE.  The patient understood these risks and consented to the

following operation.  The patient was brought back to the operating room

and placed supine on the operating room table.  SCDs were applied prior to

induction of anesthesia.  Heparin subq 5000 units were given in the

preoperative area prior to bringing the patient back.  Ancef 2 gm and 500

mg of Flagyl antibiotics were given within 30 minutes of incision.    Following 
successful

endotracheal intubation,  Upper and lower body warmers were placed prior to 
incision.A Guido was inserted, the patient's abdomen was shaved,  prepped and 
draped in sterile fashion.   Of note, the patient has a history of a left arm 
traumatic injury, which was

repaired with fixation, and so his elbow had limited range of motion without 
full extension at the elbow.  This

limited my ability to tuck his left arm likely we normally do, but the

patient's left arm was padded and placed in a neutral position at the side

of the table on armboard.  A time-out was performed prior to making the

incision.  We gained access to the abdominal cavity with a supraumbilical 
direct cut down

for a 12mm port  An 11-blade was used to make a skin incision. 

Kochers were used to grasp the fascia, and the incision was made into the

fascia.  There was direct visualization into the peritoneal cavity.  A

12-mm port was inserted, and the abdomen was insufflated.  I then placed a

10-mm 0-degree laparoscope to check for any signs of injury during our

initial entry, and there were none.  Of note, during this time with initial

insufflation pressures up to 15 and flow rate of 40, the patient did become

bradycardic, but did not drop his blood pressure.  The pneumoperitoneum was

immediately released from his abdomen, and the patient's belly did appear

tight.  As we waited for Anesthesia to catch up and after giving the

patient some time as well as more muscle relaxant, the patient's heart rate

came back up to his baseline, between 80 and 90.  We then waited until

Anesthesia was ready to reinsufflate the abdomen.  The flow pressures were

dropped to half.  We slowly resufflated the pneumoperitoneum, the patient

tolerated this well, so we proceeded with the operation.  The abdominal

cavity was then inspected using a 10-mm 30-degree scope for any evidence of

any peritoneal lesions or liver lesions, and there were none. Additional 
working ports were placed.  All ports were

preanesthetized with 0.25% Marcaine prior to making skin incision.  We

placed a 5-mm port in the left upper quadrant, a 12-mm port in the left

lower quadrant, and a 5-mm port in the suprapubic region.  We  identified the 
area of the right

colon that was marked with tattoo, and that was easily visible up into the

ascending colon, did not involve any of the transverse colon.  Greater omentum 
was placed cephalad over the liver. We then

identified the ileocecal junction, and retraction was placed laterally to

begin our dissection from a medial lateral approach of the ileocecal

pedicle.  During this time, because of all of his visceral adiposity, it

was very difficult to gain appropriate retraction and counter tension, and

so this took about 15 to 20 minutes.  I had to add another additional

working port for my assistant in the the right mid abdomen lateral to the

rectus muscles at the level of the umbilicus; this was a 5-mm port.  After

this was done, we were able to achieve somewhat adequate visualization.  I

started from a medial lateral approach scoring the mesentery surrounding

the ileocecal pedicle, began creating my tunnel.  The pedicle was then

skeletonized; however, it was very difficult to achieve a clear plane again

because of the adiposity and visualization.  I was very meticulous to make

sure that we are not entering any retroperitoneal structures prior to

transecting any vessels.  During this time, due to the difficulty of making

any progress, I had asked for assistance from a senior colleague, Dr. Dao, who came into the room to just evaluate and made suggestion we

switch over to a lateral dissection, and we then did that.  The lateral

attachments of the colon and the retroperitoneum were taken down using a

combination of blunt, sharp and electrocautery with ligature energy device.

Lot of pictures were taken down.  Retroperitoneal fat structures were left

in place, and the right colon and mesentery were dissected medially.  This

was done from a level of the cecum and appendix all the way up to the

hepatic flexure.  At this point, I then turned our attention to mobilizing

the route to the small bowel at the terminal ileum taking care not to get into 
the retroperitoneum.  The right ureter was

visualized, and we dissected up further towards the duodenum for about 5 cm.  I 
then transected

the terminal ileum about 5 to 6 cm distal to the ileocecal junction. A 
mesenteric defect was created and 

a 60-mm Endo WHITNEY linear stapling device blue load was used to transect the 
ileum. We then turned our attention to the more superior

dissection taking down the hepatocolic ligaments.  I started approximately

at the mid point of the transverse colon.  The greater omentum was placed

cephalad above the liver and identified the transverse colon.  With

appropriate traction, we were able to get into the lesser sac.  The

dissection was then continued towards the hepatic flexure without any incident.
  Again, this was

challenging due to the weight of his mesentery from all of the visceral

adiposity as well as the large epiploic appendages, so this added about 30

to 45 minutes more than usual to do this part of the procedure.  Once there

was adequate mobilization of the transverse colon, I then elected to

exteriorize the specimen.  At this point, the abdomen was desufflated, and

the umbilical port site incision was extended in a vertical fashion coming

right through the umbilicus for approximately 3 or 4 cm.  A wound protector

was then placed and the specimen exteriorized.  At

this point, there was clear delineation of the deascularized right colon, and we

transsected the transverse colon approximately 5 to 6 cm distal to the

inked margin using a

75-mm WHITNEY blue load linear stapler.  Once this was done, the specimen was

completely extracted and sent off the pathology.  I then brought up the 
transected segment of the terminal ileum

was also delivered through the wound protector.  At this point, I tried to

arrange the anastomosis externally; however due to tension on the

transverse colon, I did not like the way it was going to lie, and so I

elected to just internalize the specimen and proceed with intracorporeal

stapled anastomosis.  We reinserted the contents into the abdominal

cavity.  A 12-mm camera port was then reinserted through the wound

protector.  We then wrapped umbilical tape to ensure pneumoperitoneum was

maintained, and there was no leakage of gas around the enlarged incision.  

We then lined the terminal ileum to the transverse colon.  The orientation

was made in an isoperistaltic fashion.  Stay suture was placed from the

tenia coli of transverse colon to the antimesenteric border of the small

bowel. Enterotomies was

made both in the colon and small intestine using a monopolar [place] device.  
Intraluminal entry was confirmed.  A 60-mm Endo WHITNEY

linear stapling device blue load used to make the ileocolonic anastomosis.  The

anastomosis was inspected from within and that seems to be patent.  The

common defect was then closed using 2-0 v-lock nonabsorbable barbed suture. 
Additional interrupted sutures

were placed in a Lembert fashion to buttress the common enterotomy closure with 
3-0 vicryl.  There

were total of 4 interrupted sutures placed.  The anastomosis appeared

patent and no evidence of any leakage.  The abdomen was irrigated with about

250 mL of saline and suctioned, cleaned.  There was some bleeding noted on

the greater omentum.  This was stopped with LigaSure device, and hemostasis 
confirmed. The omentum was then

draped over the anastomosis. I placed a 19-Pakistani devang drain

in the right lateral gutter where our dissection was, and this was brought

out through the patient's right 5-mm port.  I then elected to do a TAP

block  with 30 mL of 0.125 % Marcaine.  All the port sites

were injected under direct visualization creating a preperitoneal wheal. 

The remaining ports were then removed under direct

visualization.  We then closed the midline periumbilical incision with 1

PDS suture to close the fascial incision.  The skin was then closed with

staples.  The laparoscopic incisions were closed with 4-0 Monocryl. 

Dermabond was applied to the skin incisions.  



The patient tolerated the operation without any complication.  I was present 
for the

entirety of the operation.  All sponge, needle and instrument counts were 
correct

at the end of the case before closing.  The patient was then extubated into the

operating room and taken to the PACU in stable condition.





__________________________________________

Jas Villalobos MD





DD:  06/01/2018 9:14:45

DT:  06/01/2018 15:21:19

Job # [id number]

ANNIA

## 2018-06-03 LAB
ALBUMIN SERPL-MCNC: 3 G/DL (ref 3.5–5)
ALBUMIN/GLOB SERPL: 1.1 {RATIO} (ref 1–2.1)
ALT SERPL-CCNC: 14 U/L (ref 21–72)
AST SERPL-CCNC: 19 U/L (ref 17–59)
BASOPHILS # BLD AUTO: 0 K/UL (ref 0–0.2)
BASOPHILS NFR BLD: 0.3 % (ref 0–2)
BUN SERPL-MCNC: 10 MG/DL (ref 9–20)
CALCIUM SERPL-MCNC: 7.9 MG/DL (ref 8.6–10.4)
CK MB SERPL-MCNC: 0.87 NG/ML (ref 0–3.38)
EOSINOPHIL # BLD AUTO: 0.1 K/UL (ref 0–0.7)
EOSINOPHIL NFR BLD: 0.7 % (ref 0–4)
ERYTHROCYTE [DISTWIDTH] IN BLOOD BY AUTOMATED COUNT: 14.4 % (ref 11.5–14.5)
GFR NON-AFRICAN AMERICAN: > 60
HGB BLD-MCNC: 13.8 G/DL (ref 12–18)
LYMPHOCYTE: 2 % (ref 20–40)
LYMPHOCYTES # BLD AUTO: 0.5 K/UL (ref 1–4.3)
LYMPHOCYTES NFR BLD AUTO: 6 % (ref 20–40)
MCH RBC QN AUTO: 29.4 PG (ref 27–31)
MCHC RBC AUTO-ENTMCNC: 34.1 G/DL (ref 33–37)
MCV RBC AUTO: 86.4 FL (ref 80–94)
MONOCYTE: 2 % (ref 0–10)
MONOCYTES # BLD: 0.4 K/UL (ref 0–0.8)
MONOCYTES NFR BLD: 5.2 % (ref 0–10)
NEUTROPHILS # BLD: 7.5 K/UL (ref 1.8–7)
NEUTROPHILS NFR BLD AUTO: 87.8 % (ref 50–75)
NEUTROPHILS NFR BLD AUTO: 91 % (ref 50–75)
NEUTS BAND NFR BLD: 4 % (ref 0–2)
NRBC BLD AUTO-RTO: 0 % (ref 0–2)
PLATELET # BLD EST: NORMAL 10*3/UL
PLATELET # BLD: 180 K/UL (ref 130–400)
PMV BLD AUTO: 8.7 FL (ref 7.2–11.7)
RBC # BLD AUTO: 4.68 MIL/UL (ref 4.4–5.9)
TOTAL CELLS COUNTED BLD: 100
VARIANT LYMPHS NFR BLD MANUAL: 1 % (ref 0–0)
WBC # BLD AUTO: 8.5 K/UL (ref 4.8–10.8)

## 2018-06-03 RX ADMIN — PURIFIED WATER PRN LOZ: 99.05 LIQUID OPHTHALMIC at 14:06

## 2018-06-03 RX ADMIN — PURIFIED WATER PRN LOZ: 99.05 LIQUID OPHTHALMIC at 10:13

## 2018-06-03 NOTE — RAD
Abdomen two views 



History: Abdominal distention. 



Comparison: 06/03/2018 



Findings: 



Multiple prominently dilated loops of small bowel seen throughout the 

abdomen. 



Surgical clips project over the lower pelvis. 



Cardiomegaly. 



Left basilar atelectasis. Small left pleural effusion. 



Lucency underneath the right hemidiaphragm may represent free 

intraperitoneal air, possibly postsurgical. 



Impression: 



Multiple dilated loops of small bowel seen throughout the abdomen 

concerning for ileus versus bowel obstruction.  Clinical correlation. 



Free air underneath the right hemidiaphragm suggestive for recent 

surgery.

## 2018-06-03 NOTE — CP.PCM.PN
Subjective





- Date & Time of Evaluation


Date of Evaluation: 06/03/18


Time of Evaluation: 09:19





- Subjective


Subjective: 





Surgery 





Pt seen and examined. Pt had an episode of emesis overnight: 125cc gastric 

output. Denies fever, diarrhea. + amb with help. - BM, - flatus. + void. Pain 

controlled w PCA 





Objective





- Vital Signs/Intake and Output


Vital Signs (last 24 hours): 


 











Temp Pulse Resp BP Pulse Ox


 


 99.3 F   94 H  20   152/94 H  96 


 


 06/03/18 08:57  06/03/18 08:57  06/03/18 08:57  06/03/18 08:57  06/03/18 08:57








Intake and Output: 


 











 06/03/18 06/03/18





 06:59 18:59


 


Intake Total 1650 


 


Output Total 1880 


 


Balance -230 














- Medications


Medications: 


 Current Medications





Acetaminophen (Tylenol 325mg Tab)  650 mg PO Q6 PRN


   PRN Reason: Fever >100.4 F


Albuterol Sulfate (Albuterol 0.083% Inhal Sol (2.5 Mg/3 Ml) Ud)  2.5 mg INH 

ONCE PRN


   PRN Reason: Wheezing


Famotidine (Pepcid)  20 mg IVP Q12 [facility]


   Last Admin: 06/02/18 21:54 Dose:  20 mg


Heparin Sodium (Porcine) (Heparin)  5,000 units SC Q8 [facility]


   Last Admin: 06/03/18 06:25 Dose:  5,000 units


Hydromorphone/Sodium Chloride (Dilaudid Pca)  6 mg IV Q4H PRN; Protocol


   PRN Reason: Pain, moderate (4-7)


   Last Admin: 06/02/18 09:25 Dose:  6 mg


Potassium Chloride/Dextrose/Sod Cl (Potassium Chl 20 Meq In D5-1/2ns)  1,000 

mls @ 75 mls/hr IV .[id number] [facility]


   Last Admin: 06/02/18 16:25 Dose:  75 mls/hr


Sodium Phosphate 15 mmole/ (Sodium Chloride)  255 mls @ 50 mls/hr IVPB .Q5H6M 

ONE


   Stop: 06/03/18 13:23


Lidocaine (Lidoderm)  1 ea TD DAILY [facility]


   Last Admin: 06/02/18 09:26 Dose:  Not Given


Lidocaine HCl (Xylocaine 2% (Uro-Jet))  0 ea TOP ONCE ONE


   Stop: 06/03/18 09:11


Ondansetron HCl (Zofran Inj)  4 mg IV Q4 PRN


   PRN Reason: Nausea/Vomiting


   Last Admin: 06/02/18 21:54 Dose:  4 mg











- Labs


Labs: 


 





 06/03/18 03:04 





 06/03/18 03:04 











- Constitutional


Appears: Non-toxic





- Head Exam


Head Exam: ATRAUMATIC, NORMAL INSPECTION, NORMOCEPHALIC





- Eye Exam


Eye Exam: EOMI, Normal appearance, PERRL


Pupil Exam: NORMAL ACCOMODATION, PERRL





- ENT Exam


ENT Exam: Mucous Membranes Moist, Normal Exam





- Neck Exam


Neck Exam: Full ROM, Normal Inspection.  absent: Lymphadenopathy





- Respiratory Exam


Respiratory Exam: Clear to Ausculation Bilateral, NORMAL BREATHING PATTERN





- Cardiovascular Exam


Cardiovascular Exam: REGULAR RHYTHM, +S1, +S2.  absent: Murmur





- GI/Abdominal Exam


GI & Abdominal Exam: Distended.  absent: Firm, Guarding, Rigid, Rebound


Additional comments: 





Incision C/D/I. stapled. Tympanic 





-  Exam


 Exam: NORMAL INSPECTION





- Extremities Exam


Extremities Exam: Full ROM, Normal Capillary Refill, Normal Inspection.  absent

: Joint Swelling, Pedal Edema





- Back Exam


Back Exam: NORMAL INSPECTION





- Neurological Exam


Neurological Exam: Alert, Awake, CN II-XII Intact, Normal Gait, Oriented x3





- Psychiatric Exam


Psychiatric exam: Normal Affect, Normal Mood





- Skin


Skin Exam: Dry, Intact, Normal Color, Warm





Assessment and Plan





- Assessment and Plan (Free Text)


Assessment: 





72M POD#3 s/p laparoscopic right hemicolectomy for Incomplete polypectomy of 

right colon for tubular Adenoma


Tmax 99.3


HR , //94


Dilaudid PCA received 1.2mg over the last 8 hours


Urine output 100cc/hr


Tim: 160 serosang/8hrs 


Plan: 


Continue telemetry


Follow up cardiology 


MOnitor NGT output


Continue NPO


Continue Pepcid


Monitor Outputs


Lidoderm patch 


PCA for pain


D5 1/2 20K @100


Replete Phos


Out of bed


Ambulate


Incentive spriometer


SCDs


Further recs per Dr. Villalobos

## 2018-06-04 LAB
ALBUMIN SERPL-MCNC: 2.8 G/DL (ref 3.5–5)
ALBUMIN/GLOB SERPL: 1.1 {RATIO} (ref 1–2.1)
ALT SERPL-CCNC: 13 U/L (ref 21–72)
AST SERPL-CCNC: 20 U/L (ref 17–59)
BASOPHILS # BLD AUTO: 0 K/UL (ref 0–0.2)
BASOPHILS NFR BLD: 0.2 % (ref 0–2)
BUN SERPL-MCNC: 12 MG/DL (ref 9–20)
CALCIUM SERPL-MCNC: 8 MG/DL (ref 8.6–10.4)
EOSINOPHIL # BLD AUTO: 0 K/UL (ref 0–0.7)
EOSINOPHIL NFR BLD: 0.4 % (ref 0–4)
EOSINOPHIL NFR BLD: 1 % (ref 0–4)
ERYTHROCYTE [DISTWIDTH] IN BLOOD BY AUTOMATED COUNT: 14.3 % (ref 11.5–14.5)
GFR NON-AFRICAN AMERICAN: > 60
HGB BLD-MCNC: 14 G/DL (ref 12–18)
LYMPHOCYTE: 8 % (ref 20–40)
LYMPHOCYTES # BLD AUTO: 0.7 K/UL (ref 1–4.3)
LYMPHOCYTES NFR BLD AUTO: 9.9 % (ref 20–40)
MCH RBC QN AUTO: 29.2 PG (ref 27–31)
MCHC RBC AUTO-ENTMCNC: 34.2 G/DL (ref 33–37)
MCV RBC AUTO: 85.3 FL (ref 80–94)
MONOCYTE: 8 % (ref 0–10)
MONOCYTES # BLD: 0.6 K/UL (ref 0–0.8)
MONOCYTES NFR BLD: 8.8 % (ref 0–10)
NEUTROPHILS # BLD: 5.4 K/UL (ref 1.8–7)
NEUTROPHILS NFR BLD AUTO: 80.7 % (ref 50–75)
NEUTROPHILS NFR BLD AUTO: 83 % (ref 50–75)
NRBC BLD AUTO-RTO: 0 % (ref 0–2)
PLATELET # BLD EST: NORMAL 10*3/UL
PLATELET # BLD: 231 K/UL (ref 130–400)
PMV BLD AUTO: 8.9 FL (ref 7.2–11.7)
RBC # BLD AUTO: 4.78 MIL/UL (ref 4.4–5.9)
RBC MORPH BLD: NORMAL
TOTAL CELLS COUNTED BLD: 100
WBC # BLD AUTO: 6.7 K/UL (ref 4.8–10.8)

## 2018-06-04 RX ADMIN — POTASSIUM CHLORIDE, DEXTROSE MONOHYDRATE AND SODIUM CHLORIDE SCH MLS/HR: 150; 5; 450 INJECTION, SOLUTION INTRAVENOUS at 22:43

## 2018-06-04 RX ADMIN — POTASSIUM CHLORIDE, DEXTROSE MONOHYDRATE AND SODIUM CHLORIDE SCH: 150; 5; 450 INJECTION, SOLUTION INTRAVENOUS at 21:53

## 2018-06-04 RX ADMIN — POTASSIUM CHLORIDE, DEXTROSE MONOHYDRATE AND SODIUM CHLORIDE SCH MLS/HR: 150; 5; 450 INJECTION, SOLUTION INTRAVENOUS at 08:34

## 2018-06-04 RX ADMIN — PURIFIED WATER PRN LOZ: 99.05 LIQUID OPHTHALMIC at 23:53

## 2018-06-04 NOTE — RAD
HISTORY:

Ileus.



COMPARISON:

Susana 3, 2018. 



FINDINGS:



BOWEL:

Nasogastric tube in the proximal stomach, the side port at gastric 

shaft shield junction. Small placement would required enhancing at 

least 07-10cm. Persistent dilatation of small bowel. 



Free air identified previously has resolved. 



BONES:

Normal.



OTHER FINDINGS:

None.



IMPRESSION:

Persistent dilatation proximal small bowel disproportionate to distal 

small bowel and colon.  Postoperative ileus/obstruction is stable.



Nasogastric tube identified in the proximal stomach.



Resolution of free intraperitoneal and presumed postoperative air.

## 2018-06-04 NOTE — CARD
--------------- APPROVED REPORT --------------





EKG Measurement

Heart [id number]

NH [id number]

[id number]

QT346T-10

[id number]



<Conclusion>

Sinus tachycardia

Inferior infarct, age undetermined

Abnormal ECG

## 2018-06-04 NOTE — CP.PCM.PN
<Tiburcio Dietrich D - Last Filed: 06/04/18 14:02>





Subjective





- Date & Time of Evaluation


Date of Evaluation: 06/04/18


Time of Evaluation: 07:45





- Subjective


Subjective: 





SURGERY PROGRESS NOTE FOR DR. ROBERT EVANS





72M seen and examined at bedside. Patient states pain is well controlled. He 

denies nausea or vomiting, fevers or chills. He is having hiccups. Patient 

denies flatus, denies bowel movement.





Objective





- Vital Signs/Intake and Output


Vital Signs (last 24 hours): 


 











Temp Pulse Resp BP Pulse Ox


 


 98.5 F   90   20   113/73   95 


 


 06/04/18 09:36  06/04/18 09:36  06/04/18 09:36  06/04/18 13:49  06/04/18 09:36








Intake and Output: 


 











 06/04/18 06/04/18





 06:59 18:59


 


Intake Total 600 


 


Output Total 420 


 


Balance 180 














- Medications


Medications: 


 Current Medications





Acetaminophen (Tylenol 325mg Tab)  650 mg PO Q6 PRN


   PRN Reason: Fever >100.4 F


Albuterol Sulfate (Albuterol 0.083% Inhal Sol (2.5 Mg/3 Ml) Ud)  2.5 mg INH 

ONCE PRN


   PRN Reason: Wheezing


Benzocaine/Menthol (Cepacol Sore Throat)  1 emmanuel MT Q4 PRN


   PRN Reason: Sore Throat


   Last Admin: 06/03/18 14:06 Dose:  1 emmanuel


Bisacodyl (Dulcolax)  10 mg GA DAILY [facility]


   Last Admin: 06/04/18 13:52 Dose:  10 mg


Famotidine (Pepcid)  20 mg IVP Q12 [facility]


   Last Admin: 06/04/18 11:00 Dose:  20 mg


Heparin Sodium (Porcine) (Heparin)  5,000 units SC Q8 [facility]


   Last Admin: 06/04/18 13:32 Dose:  5,000 units


Hydromorphone/Sodium Chloride (Dilaudid Pca)  6 mg IV Q4H PRN; Protocol


   PRN Reason: Pain, moderate (4-7)


   Last Admin: 06/04/18 12:07 Dose:  6 mg


Potassium Chloride/Dextrose/Sod Cl (Potassium Chl 20 Meq In D5-1/2ns)  1,000 

mls @ 80 mls/hr IV .[id number] [facility]


   Last Admin: 06/04/18 08:34 Dose:  80 mls/hr


Potassium Phosphate 30 mmole/ (Sodium Chloride)  260 mls @ 63 mls/hr IV ONCE ONE


   Stop: 06/04/18 18:07


Lidocaine (Lidoderm)  1 ea TD DAILY@0800 [facility]


   Last Admin: 06/04/18 08:26 Dose:  1 ea


Ondansetron HCl (Zofran Inj)  4 mg IV Q4 PRN


   PRN Reason: Nausea/Vomiting


   Last Admin: 06/02/18 21:54 Dose:  4 mg











- Labs


Labs: 


 





 06/04/18 08:51 





 06/04/18 08:51 











- Constitutional


Appears: Well, Non-toxic, No Acute Distress





- Respiratory Exam


Respiratory Exam: Clear to Ausculation Bilateral





- Cardiovascular Exam


Cardiovascular Exam: REGULAR RHYTHM, +S1, +S2





- GI/Abdominal Exam


GI & Abdominal Exam: Distended (full), Soft.  absent: Firm, Guarding, Rigid, 

Tenderness, Rebound


Additional comments: 





Drain in place 150cc/24hrs serous


NGT in place - 120cc since insertion, bilious content


Incisions CDI





- Extremities Exam


Extremities Exam: absent: Pedal Edema, Tenderness





- Neurological Exam


Neurological Exam: Alert, Awake





- Skin


Skin Exam: Dry, Intact, Normal Color, Warm





Assessment and Plan





- Assessment and Plan (Free Text)


Assessment: 





72M s/p laparoscopic right hemicolectomy for incomplete polypectomy of right 

colon for tubular Adenoma POD#4


AbdX-ray - stable persistent dilated loops of bowel consistent with ileus


Plan: 


 


Continue NPO


Monitor NGT output/Drain output


Continue pain control


Decreased IV fluid to 80cc/hr


Benzocaine mouth spray


Incentive spirometer


Encourage ambulation


SCDs


Discussed with Dr. Wilfredo Dietrich, PGY2





<Robert Evans - Last Filed: 06/05/18 09:38>





Objective





- Vital Signs/Intake and Output


Vital Signs (last 24 hours): 


 











Temp Pulse Resp BP Pulse Ox


 


 98.5 F   100 H  20   121/81   95 


 


 06/05/18 08:26  06/05/18 08:26  06/05/18 08:26  06/05/18 08:26  06/05/18 08:26








Intake and Output: 


 











 06/05/18 06/05/18





 06:59 18:59


 


Intake Total 640 


 


Output Total 1715 


 


Balance -1075 














- Medications


Medications: 


 Current Medications





Acetaminophen (Tylenol 325mg Tab)  650 mg PO Q6 PRN


   PRN Reason: Fever >100.4 F


Benzocaine/Menthol (Cepacol Sore Throat)  1 emmanuel MT Q4 PRN


   PRN Reason: Sore Throat


   Last Admin: 06/04/18 23:53 Dose:  1 emmanuel


Famotidine (Pepcid)  20 mg IVP Q12 [facility]


   Last Admin: 06/05/18 09:22 Dose:  20 mg


Heparin Sodium (Porcine) (Heparin)  5,000 units SC Q8 [facility]


   Last Admin: 06/05/18 06:31 Dose:  Not Given


Hydromorphone/Sodium Chloride (Dilaudid Pca)  6 mg IV Q4H PRN; Protocol


   PRN Reason: Pain, severe (8-10)


Potassium Chloride/Dextrose/Sod Cl (Potassium Chl 20 Meq In D5-1/2ns)  1,000 

mls @ 80 mls/hr IV .[id number] [facility]


   Last Admin: 06/05/18 09:27 Dose:  Not Given


Potassium Phosphate 15 mmole/ (Dextrose)  255 mls @ 42.5 mls/hr IVPB ONCE ONE


   Stop: 06/05/18 19:29


Lidocaine (Lidoderm)  1 ea TD DAILY@0800 [facility]


   Last Admin: 06/05/18 08:31 Dose:  1 ea


Ondansetron HCl (Zofran Inj)  4 mg IV Q4 PRN


   PRN Reason: Nausea/Vomiting


   Last Admin: 06/02/18 21:54 Dose:  4 mg











- Labs


Labs: 


 





 06/05/18 07:10 





 06/05/18 07:10 











Assessment and Plan





- Assessment and Plan (Free Text)


Plan: 


Patient seen and examined with resident staff and agree with above. Post op 

ileus, volume overload, actively getting diuresed improving respiratory effort. 

NGT decompression with symptomatic relief. Patient admittedly not using PCA 

concern of over using it. I explained at lenght it is important for him to use 

PCA and control pain well enough so he is able to ambulate and breath deeply to 

prevent atelectasis and DVT as he has history of VTE in past. Abd xry 

consistent with ileus. Gas seen in distal colon and some stool. Cont. NPO. IVF. 

NGT to LIWS. Dulcolax suppository. Replete electrolytes

## 2018-06-05 LAB
ALBUMIN SERPL-MCNC: 2.8 G/DL (ref 3.5–5)
ALBUMIN/GLOB SERPL: 1.1 {RATIO} (ref 1–2.1)
ALT SERPL-CCNC: 28 U/L (ref 21–72)
AST SERPL-CCNC: 34 U/L (ref 17–59)
BASOPHILS # BLD AUTO: 0 K/UL (ref 0–0.2)
BASOPHILS NFR BLD: 0.4 % (ref 0–2)
BUN SERPL-MCNC: 16 MG/DL (ref 9–20)
CALCIUM SERPL-MCNC: 8.1 MG/DL (ref 8.6–10.4)
EOSINOPHIL # BLD AUTO: 0.1 K/UL (ref 0–0.7)
EOSINOPHIL NFR BLD: 1 % (ref 0–4)
EOSINOPHIL NFR BLD: 2.5 % (ref 0–4)
ERYTHROCYTE [DISTWIDTH] IN BLOOD BY AUTOMATED COUNT: 14.2 % (ref 11.5–14.5)
GFR NON-AFRICAN AMERICAN: > 60
HGB BLD-MCNC: 13.2 G/DL (ref 12–18)
LYMPHOCYTE: 9 % (ref 20–40)
LYMPHOCYTES # BLD AUTO: 0.7 K/UL (ref 1–4.3)
LYMPHOCYTES NFR BLD AUTO: 13.9 % (ref 20–40)
MCH RBC QN AUTO: 29.1 PG (ref 27–31)
MCHC RBC AUTO-ENTMCNC: 34 G/DL (ref 33–37)
MCV RBC AUTO: 85.5 FL (ref 80–94)
MONOCYTE: 20 % (ref 0–10)
MONOCYTES # BLD: 0.5 K/UL (ref 0–0.8)
MONOCYTES NFR BLD: 10.4 % (ref 0–10)
NEUTROPHILS # BLD: 3.9 K/UL (ref 1.8–7)
NEUTROPHILS NFR BLD AUTO: 70 % (ref 50–75)
NEUTROPHILS NFR BLD AUTO: 72.8 % (ref 50–75)
NRBC BLD AUTO-RTO: 0 % (ref 0–2)
PLATELET # BLD EST: NORMAL 10*3/UL
PLATELET # BLD: 203 K/UL (ref 130–400)
PMV BLD AUTO: 9.4 FL (ref 7.2–11.7)
RBC # BLD AUTO: 4.53 MIL/UL (ref 4.4–5.9)
RBC MORPH BLD: NORMAL
TOTAL CELLS COUNTED BLD: 100
WBC # BLD AUTO: 5.3 K/UL (ref 4.8–10.8)

## 2018-06-05 RX ADMIN — POTASSIUM CHLORIDE, DEXTROSE MONOHYDRATE AND SODIUM CHLORIDE SCH: 150; 5; 450 INJECTION, SOLUTION INTRAVENOUS at 21:33

## 2018-06-05 RX ADMIN — IPRATROPIUM BROMIDE AND ALBUTEROL SULFATE SCH ML: .5; 3 SOLUTION RESPIRATORY (INHALATION) at 20:17

## 2018-06-05 RX ADMIN — POTASSIUM CHLORIDE, DEXTROSE MONOHYDRATE AND SODIUM CHLORIDE SCH: 150; 5; 450 INJECTION, SOLUTION INTRAVENOUS at 09:27

## 2018-06-05 RX ADMIN — IPRATROPIUM BROMIDE AND ALBUTEROL SULFATE SCH ML: .5; 3 SOLUTION RESPIRATORY (INHALATION) at 13:20

## 2018-06-05 NOTE — CP.PCM.PN
<RodMohit - Last Filed: 06/05/18 07:32>





Subjective





- Date & Time of Evaluation


Date of Evaluation: 06/05/18


Time of Evaluation: 07:18





- Subjective


Subjective: 





Surgery 





Pt seen and examined. c/o sore throat. Pain controlled with PCA. Reports small 

bowel movement and flatus overnight. Ambulates with help. Voiding. Denies fever

, nausea, diarrhea. C/O mild SOB. Pt saturated at 91% overnight. Went back up 

to  97%. CXR done. Uses IS.  





Objective





- Vital Signs/Intake and Output


Vital Signs (last 24 hours): 


 











Temp Pulse Resp BP Pulse Ox


 


 99.7 F H  93 H  20   120/78   97 


 


 06/04/18 23:30  06/04/18 23:34  06/04/18 23:30  06/04/18 23:30  06/04/18 23:30








Intake and Output: 


 











 06/05/18 06/05/18





 06:59 18:59


 


Intake Total 640 


 


Output Total 1715 


 


Balance -1075 














- Medications


Medications: 


 Current Medications





Acetaminophen (Tylenol 325mg Tab)  650 mg PO Q6 PRN


   PRN Reason: Fever >100.4 F


Albuterol Sulfate (Albuterol 0.083% Inhal Sol (2.5 Mg/3 Ml) Ud)  2.5 mg INH 

ONCE PRN


   PRN Reason: Wheezing


   Last Admin: 06/05/18 03:42 Dose:  2.5 mg


Benzocaine/Menthol (Cepacol Sore Throat)  1 emmanuel MT Q4 PRN


   PRN Reason: Sore Throat


   Last Admin: 06/04/18 23:53 Dose:  1 emmanuel


Famotidine (Pepcid)  20 mg IVP Q12 [facility]


   Last Admin: 06/04/18 21:38 Dose:  20 mg


Heparin Sodium (Porcine) (Heparin)  5,000 units SC Q8 [facility]


   Last Admin: 06/05/18 06:31 Dose:  Not Given


Potassium Chloride/Dextrose/Sod Cl (Potassium Chl 20 Meq In D5-1/2ns)  1,000 

mls @ 80 mls/hr IV .[id number] [facility]


   Last Admin: 06/04/18 22:43 Dose:  80 mls/hr


Lidocaine (Lidoderm)  1 ea TD DAILY@0800 [facility]


   Last Admin: 06/04/18 08:26 Dose:  1 ea


Ondansetron HCl (Zofran Inj)  4 mg IV Q4 PRN


   PRN Reason: Nausea/Vomiting


   Last Admin: 06/02/18 21:54 Dose:  4 mg











- Labs


Labs: 


 





 06/04/18 08:51 





 06/04/18 08:51 











- Constitutional


Appears: Non-toxic





- Head Exam


Head Exam: ATRAUMATIC, NORMAL INSPECTION, NORMOCEPHALIC





- Eye Exam


Eye Exam: EOMI, Normal appearance, PERRL


Pupil Exam: NORMAL ACCOMODATION, PERRL





- ENT Exam


ENT Exam: Mucous Membranes Moist, Normal Exam





- Neck Exam


Neck Exam: Full ROM, Normal Inspection.  absent: Lymphadenopathy





- Respiratory Exam


Respiratory Exam: Clear to Ausculation Bilateral, NORMAL BREATHING PATTERN





- Cardiovascular Exam


Cardiovascular Exam: REGULAR RHYTHM, +S1, +S2.  absent: Tachycardia, Clicks, 

Murmur





- GI/Abdominal Exam


GI & Abdominal Exam: Distended, Soft, Tenderness, Normal Bowel Sounds.  absent: 

Firm, Guarding, Rigid


Additional comments: 





Incision C/D/I. Stapled. Drain in place. 





-  Exam


 Exam: NORMAL INSPECTION





- Extremities Exam


Extremities Exam: Full ROM





- Back Exam


Back Exam: NORMAL INSPECTION





- Neurological Exam


Neurological Exam: Alert, Awake, CN II-XII Intact, Normal Gait, Oriented x3





- Psychiatric Exam


Psychiatric exam: Normal Affect, Normal Mood





- Skin


Skin Exam: Dry, Intact, Normal Color, Warm





Assessment and Plan





- Assessment and Plan (Free Text)


Assessment: 





72M s/p laparoscopic right hemicolectomy for incomplete polypectomy of right 

colon for tubular Adenoma POD#5


CXR : awaiting reports


AbdX-ray - stable persistent dilated loops of bowel consistent with ileus


Urine: 140cc/hr clear yellow


Drain: 0cc/8hrs


NGT: 200cc/8hrs billious 





Plan: 


f/u CXR reads


f/u AM labs


Replete electrolyte PRN  


Continue NPO


NGT clamped at 730AM today. Will check residual in 4 hrs. 


Continue pain control


Decreased IV fluid to 80cc/hr


Benzocaine mouth spray


Incentive spirometer


Encourage ambulation


SCDs


Discussed with Dr. Villalobos





<Jas Villalobos - Last Filed: 06/05/18 10:12>





Subjective





- Date & Time of Evaluation


Time of Evaluation: 09:30





Objective





- Vital Signs/Intake and Output


Vital Signs (last 24 hours): 


 











Temp Pulse Resp BP Pulse Ox


 


 98.5 F   100 H  20   121/81   95 


 


 06/05/18 08:26  06/05/18 08:26  06/05/18 08:26  06/05/18 08:26  06/05/18 08:26








Intake and Output: 


 











 06/05/18 06/05/18





 06:59 18:59


 


Intake Total 640 


 


Output Total 1715 


 


Balance -1075 














- Medications


Medications: 


 Current Medications





Acetaminophen (Tylenol 325mg Tab)  650 mg PO Q6 PRN


   PRN Reason: Fever >100.4 F


Benzocaine/Menthol (Cepacol Sore Throat)  1 emmanuel MT Q4 PRN


   PRN Reason: Sore Throat


   Last Admin: 06/04/18 23:53 Dose:  1 emmanuel


Famotidine (Pepcid)  20 mg IVP Q12 [facility]


   Last Admin: 06/05/18 09:22 Dose:  20 mg


Heparin Sodium (Porcine) (Heparin)  5,000 units SC Q8 [facility]


   Last Admin: 06/05/18 06:31 Dose:  Not Given


Hydromorphone/Sodium Chloride (Dilaudid Pca)  6 mg IV Q4H PRN; Protocol


   PRN Reason: Pain, severe (8-10)


Potassium Chloride/Dextrose/Sod Cl (Potassium Chl 20 Meq In D5-1/2ns)  1,000 

mls @ 80 mls/hr IV .[id number] [facility]


   Last Admin: 06/05/18 09:27 Dose:  Not Given


Potassium Phosphate 15 mmole/ (Dextrose)  255 mls @ 42.5 mls/hr IVPB ONCE ONE


   Stop: 06/05/18 19:29


Lidocaine (Lidoderm)  1 ea TD DAILY@0800 [facility]


   Last Admin: 06/05/18 08:31 Dose:  1 ea


Ondansetron HCl (Zofran Inj)  4 mg IV Q4 PRN


   PRN Reason: Nausea/Vomiting


   Last Admin: 06/02/18 21:54 Dose:  4 mg











- Labs


Labs: 


 





 06/05/18 07:10 





 06/05/18 07:10 











Assessment and Plan





- Assessment and Plan (Free Text)


Plan: 


Patient seen and examined with resident staff. Agree with above. Patient did 

not ambulate or get out of bed all day yesterday, significant discomfort 

because of NGT tube. Did have BM x2 and passing flatus overnight. reports 

trouble taking full breaths with NGT in place. Still not using PCA much. Abd 

soft, full, less distended. Post-op ileus improving. Will dc ngt tube this am 

and consider clears in pm if continues to pass flatus without nausea or 

vomiting. Must get OOB and ambulate in [place]. Other time should be spent upright 

in chair. PT on board. Atlectasis on CXR, L effusion and some edema. 

Respiratory therapy and neb treatments. Will consider further diuresis.

## 2018-06-05 NOTE — CP.PCM.PN
Subjective





- Date & Time of Evaluation


Date of Evaluation: 06/05/18


Time of Evaluation: 03:58





- Subjective


Subjective: 


Overnight Update


Paged for desaturation to 91% and shortness of breath.


Upon evaluation, patient states shortness of breath is improved on breathing 

treatments which he is currently on. Patient denies chest pain, headache, 

weakness, fevers or chills. He does complain of throat irritation from NG tube. 

Patient pulling 1000ml on incentive spirometer currently.


States he is now passing flatus, and had a small bowel movement last night. 





On evaluation patient afebrile, HR90s, /70s, saturating at 96/97% on 2LNC


NAD, AAO*3


+S1/S2 RRR


CTA b/l


Soft, appropriately tender, abdomen full, non rigid, no rebound tenderness





Chest X-ray ordered, will follow up. 


El Dietrich, PGY2





Objective





- Vital Signs/Intake and Output


Vital Signs (last 24 hours): 


 











Temp Pulse Resp BP Pulse Ox


 


 99.7 F H  96 H  20   120/78   97 


 


 06/04/18 23:30  06/04/18 23:30  06/04/18 23:30  06/04/18 23:30  06/04/18 23:30








Intake and Output: 


 











 06/04/18 06/05/18





 18:59 06:59


 


Intake Total 1050 640


 


Output Total 990 1060


 


Balance 60 -420














- Medications


Medications: 


 Current Medications





Acetaminophen (Tylenol 325mg Tab)  650 mg PO Q6 PRN


   PRN Reason: Fever >100.4 F


Albuterol Sulfate (Albuterol 0.083% Inhal Sol (2.5 Mg/3 Ml) Ud)  2.5 mg INH 

ONCE PRN


   PRN Reason: Wheezing


   Last Admin: 06/05/18 03:42 Dose:  2.5 mg


Benzocaine/Menthol (Cepacol Sore Throat)  1 emmanuel MT Q4 PRN


   PRN Reason: Sore Throat


   Last Admin: 06/04/18 23:53 Dose:  1 emmanuel


Famotidine (Pepcid)  20 mg IVP Q12 [facility]


   Last Admin: 06/04/18 21:38 Dose:  20 mg


Heparin Sodium (Porcine) (Heparin)  5,000 units SC Q8 [facility]


   Last Admin: 06/04/18 21:39 Dose:  5,000 units


Potassium Chloride/Dextrose/Sod Cl (Potassium Chl 20 Meq In D5-1/2ns)  1,000 

mls @ 80 mls/hr IV .[id number] [facility]


   Last Admin: 06/04/18 22:43 Dose:  80 mls/hr


Lidocaine (Lidoderm)  1 ea TD DAILY@0800 [facility]


   Last Admin: 06/04/18 08:26 Dose:  1 ea


Ondansetron HCl (Zofran Inj)  4 mg IV Q4 PRN


   PRN Reason: Nausea/Vomiting


   Last Admin: 06/02/18 21:54 Dose:  4 mg











- Labs


Labs: 


 





 06/04/18 08:51 





 06/04/18 08:51

## 2018-06-05 NOTE — CARD
--------------- APPROVED REPORT --------------





EKG Measurement

Heart [id number]

CT 178P

[id number]

[id number]

[id number]



<Conclusion>

Normal sinus rhythm

Right superior axis deviation

Abnormal ECG

## 2018-06-05 NOTE — RAD
HISTORY:

sob  



COMPARISON:

Portable chest 06/02/2018. 



FINDINGS:

Overlying tubing is seen in the plane of the midline mediastinum and 

may reflect a nasogastric tube in fact however its tip is poorly 

characterized in this exam. Is not confirmed entering into the 

abdomen. 



LUNGS:

Limited residual linear atelectasis in the left base the remaining 

lung fields clear bilaterally.



PLEURA:

No significant pleural effusion identified, no pneumothorax apparent.



CARDIOVASCULAR:

Prominent cardiac silhouette appears stable. No pulmonary vascular 

derangement.



OSSEOUS STRUCTURES:

No significant abnormalities.



VISUALIZED UPPER ABDOMEN:

Normal.



OTHER FINDINGS:

None.



IMPRESSION:

Limited linear atelectasis left base with remainder exam reflecting 

stable prominent appearing cardiac silhouette.  No pulmonary vascular 

congestion or pneumonia appreciable bilaterally.

## 2018-06-06 LAB
BASOPHILS # BLD AUTO: 0 K/UL (ref 0–0.2)
BASOPHILS NFR BLD: 0.3 % (ref 0–2)
BUN SERPL-MCNC: 16 MG/DL (ref 9–20)
CALCIUM SERPL-MCNC: 8.3 MG/DL (ref 8.6–10.4)
EOSINOPHIL # BLD AUTO: 0.2 K/UL (ref 0–0.7)
EOSINOPHIL NFR BLD: 4.2 % (ref 0–4)
ERYTHROCYTE [DISTWIDTH] IN BLOOD BY AUTOMATED COUNT: 14.6 % (ref 11.5–14.5)
GFR NON-AFRICAN AMERICAN: > 60
HGB BLD-MCNC: 13.4 G/DL (ref 12–18)
LYMPHOCYTES # BLD AUTO: 0.7 K/UL (ref 1–4.3)
LYMPHOCYTES NFR BLD AUTO: 17.1 % (ref 20–40)
MCH RBC QN AUTO: 28.9 PG (ref 27–31)
MCHC RBC AUTO-ENTMCNC: 33.5 G/DL (ref 33–37)
MCV RBC AUTO: 86.2 FL (ref 80–94)
MONOCYTES # BLD: 0.6 K/UL (ref 0–0.8)
MONOCYTES NFR BLD: 14.7 % (ref 0–10)
NEUTROPHILS # BLD: 2.6 K/UL (ref 1.8–7)
NEUTROPHILS NFR BLD AUTO: 63.7 % (ref 50–75)
NRBC BLD AUTO-RTO: 0.1 % (ref 0–2)
PLATELET # BLD: 240 K/UL (ref 130–400)
PMV BLD AUTO: 9 FL (ref 7.2–11.7)
RBC # BLD AUTO: 4.64 MIL/UL (ref 4.4–5.9)
WBC # BLD AUTO: 4.1 K/UL (ref 4.8–10.8)

## 2018-06-06 RX ADMIN — IPRATROPIUM BROMIDE AND ALBUTEROL SULFATE SCH ML: .5; 3 SOLUTION RESPIRATORY (INHALATION) at 19:45

## 2018-06-06 RX ADMIN — IPRATROPIUM BROMIDE AND ALBUTEROL SULFATE SCH ML: .5; 3 SOLUTION RESPIRATORY (INHALATION) at 01:11

## 2018-06-06 RX ADMIN — IPRATROPIUM BROMIDE AND ALBUTEROL SULFATE SCH: .5; 3 SOLUTION RESPIRATORY (INHALATION) at 13:30

## 2018-06-06 RX ADMIN — IPRATROPIUM BROMIDE AND ALBUTEROL SULFATE SCH: .5; 3 SOLUTION RESPIRATORY (INHALATION) at 07:50

## 2018-06-06 RX ADMIN — PURIFIED WATER PRN LOZ: 99.05 LIQUID OPHTHALMIC at 13:06

## 2018-06-06 NOTE — CP.PCM.PN
Subjective





- Date & Time of Evaluation


Date of Evaluation: 06/06/18


Time of Evaluation: 11:00





- Subjective


Subjective: 





SURGERY NOTE FOR DR. EVANS





72M seen and examined out of bed on the chair. Patient states he is well, 

denies pain, denies nausea,vomiting, fevers or chills. He admits to multiple 

bouts of bowel movements. He states he has been ambulating daily. Patient has 

been using incentive spirometer (>1000ml). 





Objective





- Vital Signs/Intake and Output


Vital Signs (last 24 hours): 


 











Temp Pulse Resp BP Pulse Ox


 


 98.4 F   88   20   119/69   95 


 


 06/06/18 08:29  06/06/18 08:29  06/06/18 08:29  06/06/18 08:29  06/06/18 08:29








Intake and Output: 


 











 06/06/18 06/06/18





 06:59 18:59


 


Output Total 305 


 


Balance -305 














- Medications


Medications: 


 Current Medications





Acetaminophen (Tylenol 325mg Tab)  650 mg PO Q6 PRN


   PRN Reason: Fever >100.4 F


Albuterol/Ipratropium (Duoneb 3 Mg/0.5 Mg (3 Ml) Ud)  3 ml INH RQ6 [facility]


   Last Admin: 06/06/18 01:11 Dose:  3 ml


Benzocaine/Menthol (Cepacol Sore Throat)  1 emmanuel MT Q4 PRN


   PRN Reason: Sore Throat


   Last Admin: 06/04/18 23:53 Dose:  1 emmanuel


Docusate Sodium (Colace)  100 mg PO DAILY [facility]


   Stop: 06/09/18 10:01


   Last Admin: 06/06/18 09:49 Dose:  Not Given


Famotidine (Pepcid)  20 mg IVP Q12 [facility]


   Last Admin: 06/06/18 09:18 Dose:  20 mg


Heparin Sodium (Porcine) (Heparin)  5,000 units SC Q8 [facility]


   Last Admin: 06/06/18 06:01 Dose:  5,000 units


Lidocaine (Lidoderm)  1 ea TD DAILY@0800 [facility]


   Last Admin: 06/06/18 09:00 Dose:  1 ea


Ondansetron HCl (Zofran Inj)  4 mg IV Q4 PRN


   PRN Reason: Nausea/Vomiting


   Last Admin: 06/02/18 21:54 Dose:  4 mg


Tramadol HCl (Ultram)  50 mg PO TID PRN


   PRN Reason: Pain, moderate (4-7)











- Labs


Labs: 


 





 06/06/18 06:54 





 06/05/18 07:10 











- Constitutional


Appears: Well, Non-toxic, No Acute Distress





- Respiratory Exam


Respiratory Exam: Clear to Ausculation Bilateral, NORMAL BREATHING PATTERN





- GI/Abdominal Exam


GI & Abdominal Exam: Soft.  absent: Distended, Firm, Guarding, Rigid, Tenderness

, Rebound


Additional comments: 





incisions clean dry intact


Lidoderm patch in place


Drain in place with less than 15cc dark serous output





- Extremities Exam


Extremities Exam: absent: Pedal Edema, Tenderness





- Neurological Exam


Neurological Exam: Alert, Awake





- Skin


Skin Exam: Dry, Intact, Normal Color, Warm





Assessment and Plan





- Assessment and Plan (Free Text)


Assessment: 





72M s/p laparoscopic right hemicolectomy for incomplete polypectomy of right 

colon for tubular Adenoma POD#6


Plan: 





- advance diet to low residual


- PRN pain control


- Remove drain


- continue to encourage ambulation


Discussed with Dr. Wilfredo Dietrich, PGY2

## 2018-06-07 LAB
ALBUMIN SERPL-MCNC: 3 G/DL (ref 3.5–5)
ALBUMIN/GLOB SERPL: 1.1 {RATIO} (ref 1–2.1)
ALT SERPL-CCNC: 28 U/L (ref 21–72)
AST SERPL-CCNC: 30 U/L (ref 17–59)
BASOPHILS # BLD AUTO: 0 K/UL (ref 0–0.2)
BASOPHILS NFR BLD: 0.3 % (ref 0–2)
BUN SERPL-MCNC: 16 MG/DL (ref 9–20)
CALCIUM SERPL-MCNC: 8.4 MG/DL (ref 8.6–10.4)
EOSINOPHIL # BLD AUTO: 0.1 K/UL (ref 0–0.7)
EOSINOPHIL NFR BLD: 2 % (ref 0–4)
ERYTHROCYTE [DISTWIDTH] IN BLOOD BY AUTOMATED COUNT: 14.6 % (ref 11.5–14.5)
GFR NON-AFRICAN AMERICAN: > 60
HGB BLD-MCNC: 13.2 G/DL (ref 12–18)
LYMPHOCYTES # BLD AUTO: 0.7 K/UL (ref 1–4.3)
LYMPHOCYTES NFR BLD AUTO: 16.1 % (ref 20–40)
MCH RBC QN AUTO: 28.4 PG (ref 27–31)
MCHC RBC AUTO-ENTMCNC: 33.3 G/DL (ref 33–37)
MCV RBC AUTO: 85.3 FL (ref 80–94)
MONOCYTES # BLD: 0.6 K/UL (ref 0–0.8)
MONOCYTES NFR BLD: 12.2 % (ref 0–10)
NEUTROPHILS # BLD: 3.2 K/UL (ref 1.8–7)
NEUTROPHILS NFR BLD AUTO: 69.4 % (ref 50–75)
NRBC BLD AUTO-RTO: 0.1 % (ref 0–2)
PLATELET # BLD: 269 K/UL (ref 130–400)
PMV BLD AUTO: 9.3 FL (ref 7.2–11.7)
RBC # BLD AUTO: 4.65 MIL/UL (ref 4.4–5.9)
WBC # BLD AUTO: 4.6 K/UL (ref 4.8–10.8)

## 2018-06-07 RX ADMIN — IPRATROPIUM BROMIDE AND ALBUTEROL SULFATE SCH: .5; 3 SOLUTION RESPIRATORY (INHALATION) at 19:31

## 2018-06-07 RX ADMIN — IPRATROPIUM BROMIDE AND ALBUTEROL SULFATE SCH ML: .5; 3 SOLUTION RESPIRATORY (INHALATION) at 08:05

## 2018-06-07 RX ADMIN — IPRATROPIUM BROMIDE AND ALBUTEROL SULFATE SCH ML: .5; 3 SOLUTION RESPIRATORY (INHALATION) at 02:42

## 2018-06-07 RX ADMIN — IPRATROPIUM BROMIDE AND ALBUTEROL SULFATE SCH: .5; 3 SOLUTION RESPIRATORY (INHALATION) at 13:58

## 2018-06-07 NOTE — CT
EXAM:

  CT Abdomen and Pelvis With Intravenous Contrast



CLINICAL HISTORY:

  72 years old, male; Signs and symptoms; Nausea; Prior surgery; Surgery date: 

3-7 days post-operative; Surgery type: Lap right hemicolectomy; Additional 

info: 7 days post- op lap right hemicolectomy



TECHNIQUE:

  Axial computed tomography images of the abdomen and pelvis with intravenous 

contrast.  All CT scans at this facility use one or more dose reduction 

techniques, viz.: automated exposure control; ma/kV adjustment per patient size 

(including targeted exams where dose is matched to indication; i.e. head); or 

iterative reconstruction technique.

  Coronal reformatted images were created and reviewed.



CONTRAST:

  100 mL of VISIPAQUE 320 administered intravenously.



COMPARISON:

  No relevant prior studies available.



FINDINGS:

  Lung bases: Streaky bibasilar opacities secondary to atelectasis or 

infiltrate.



 ABDOMEN:

  Liver:  The liver is within normal limits for this noncontrast study.

  Gallbladder and bile ducts: Mildly distended gallbladder. No calcified 

stones.  No ductal dilation.

  Pancreas: There is diffuse, benign fatty infiltration of the pancreas.

  Spleen:  Unremarkable.  No splenomegaly.

  Adrenals:  Unremarkable.  No mass.

  Kidneys and ureters:  Unremarkable.  No solid mass.  No hydronephrosis.

  Stomach and bowel:  There are postsurgical changes consistent with recent 

right hemicolectomy and ileocolonic anastomosis. There is a moderate amount of 

inflammatory stranding in the mesentery surrounding the ileocolonic 

anastomosis.  



There is a collection of mottled gas in the region of the colonic resection and 

suture material. There is a long tubular structure containing fluid and a trace 

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 The 

fistulous tract connects to the mottled gas in the colon. The caudal aspect of 

the fistulous tract extends to the right lateral abdominal wall and skin. The 

cephalad portion of the fistulous tract extends to the diaphragm anterior to 

the liver.

A moderate amount of inflammatory stranding and trace mesenteric fluid 

surrounds the ileocolonic anastomosis. 



Moderate to severe diffuse dilatation of the proximal and mid bowel which is 

filled with fluid and air. There is a transition point in the mid abdomen with 

decompressed distal small bowel. 



 PELVIS:

  Bladder: Trace amount of gas in the bladder probably secondary to previous 

instrumentation. Bladder is otherwise unremarkable. No mass.

  Reproductive:  Unremarkable as visualized.



 ABDOMEN and PELVIS:

  Intraperitoneal space:  No significant fluid collection.

  Bones/joints:  No acute fracture.  No dislocation.

  Soft tissues: Midline abdominal wall incision with overlying skin staples.

  Vasculature:  Unremarkable.  No abdominal aortic aneurysm.

  Lymph nodes:  Unremarkable.  No enlarged lymph nodes.



IMPRESSION:     

Status post right hemicolectomy with ileocolonic anastomosis. Findings suggest 

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 The 

caudal portion of the fistulous tract extends to the right lateral abdominal 

wall and the cephalad portion extends to the diaphragm.  No focal fluid 

collection or drainable abscess.



Moderate to severe dilatation of the proximal mid small bowel secondary to 

severe postoperative ileus or small bowel obstruction.



Streaky bibasilar opacities secondary to atelectasis or infiltrate.

## 2018-06-07 NOTE — CP.PCM.PN
<Tiburcio Dietrich - Last Filed: 06/07/18 07:47>





Subjective





- Date & Time of Evaluation


Date of Evaluation: 06/07/18


Time of Evaluation: 06:30





- Subjective


Subjective: 





SURGERY NOTE FOR DR. EVANS





72M seen and examined at bedside. Overnight patient had an episode of vomiting 

around 4AM. Vomitus was approx 350cc of dark bilious content. Patient states he 

currently feels much better, he denies nausea, vomiting and abdominal pain. He 

denies fevers or chills. Patients continues to admit to flatus and having small 

amounts of bowel movements. States he has been having liquid and has not tried 

solid food. Has been out of bed and ambulating.





Objective





- Vital Signs/Intake and Output


Vital Signs (last 24 hours): 


 











Temp Pulse Resp BP Pulse Ox


 


 99.3 F   97 H  20   124/75   92 L


 


 06/06/18 23:30  06/07/18 04:36  06/06/18 23:30  06/06/18 23:30  06/06/18 23:30








Intake and Output: 


 











 06/07/18 06/07/18





 06:59 18:59


 


Output Total 1150 


 


Balance -1150 














- Medications


Medications: 


 Current Medications





Acetaminophen (Tylenol 325mg Tab)  650 mg PO Q6 PRN


   PRN Reason: Fever >100.4 F


Albuterol/Ipratropium (Duoneb 3 Mg/0.5 Mg (3 Ml) Ud)  3 ml INH RQ6 [facility]


   Last Admin: 06/07/18 02:42 Dose:  3 ml


Benzocaine/Menthol (Cepacol Sore Throat)  1 emmanuel MT Q4 PRN


   PRN Reason: Sore Throat


   Last Admin: 06/06/18 13:06 Dose:  1 emmanuel


Docusate Sodium (Colace)  100 mg PO DAILY [facility]


   Stop: 06/09/18 10:01


   Last Admin: 06/06/18 09:49 Dose:  Not Given


Famotidine (Pepcid)  20 mg IVP Q12 [facility]


   Last Admin: 06/06/18 22:12 Dose:  20 mg


Heparin Sodium (Porcine) (Heparin)  5,000 units SC Q8 [facility]


   Last Admin: 06/07/18 05:35 Dose:  5,000 units


Lidocaine (Lidoderm)  1 ea TD DAILY@0800 [facility]


   Last Admin: 06/06/18 09:00 Dose:  1 ea


Ondansetron HCl (Zofran Inj)  4 mg IV Q4 PRN


   PRN Reason: Nausea/Vomiting


   Last Admin: 06/02/18 21:54 Dose:  4 mg


Tramadol HCl (Ultram)  50 mg PO TID PRN


   PRN Reason: Pain, moderate (4-7)


   Last Admin: 06/06/18 12:51 Dose:  50 mg











- Labs


Labs: 


 





 06/06/18 06:54 





 06/06/18 06:51 











- Constitutional


Appears: Non-toxic, No Acute Distress





- Respiratory Exam


Respiratory Exam: Clear to Ausculation Bilateral, NORMAL BREATHING PATTERN


Additional comments: 





Incentive spirometer - 1250ml





- Cardiovascular Exam


Cardiovascular Exam: REGULAR RHYTHM, +S1, +S2





- GI/Abdominal Exam


GI & Abdominal Exam: Soft.  absent: Distended, Firm, Guarding, Rigid, Tenderness

, Rebound


Additional comments: 





incisions clean, dry and intact





- Extremities Exam


Extremities Exam: absent: Pedal Edema, Tenderness





- Neurological Exam


Neurological Exam: Alert, Awake





- Skin


Skin Exam: Dry, Intact, Normal Color, Warm





Assessment and Plan





- Assessment and Plan (Free Text)


Assessment: 





72M s/p laparoscopic right hemicolectomy for incomplete polypectomy of right 

colon for tubular Adenoma POD#7


Plan: 





- full liquid diet


- monitor diet toleration


- PRN pain control


- Mag citrate


- f/u labs


- continue to encourage ambulation


Discussed with Dr. Wilfredo Dietrich, PGY2





<Jas Evans - Last Filed: 06/07/18 23:31>





Objective





- Vital Signs/Intake and Output


Vital Signs (last 24 hours): 


 











Temp Pulse Resp BP Pulse Ox


 


 98.2 F   95 H  20   107/70   93 L


 


 06/07/18 16:03  06/07/18 16:03  06/07/18 16:03  06/07/18 16:03  06/07/18 16:03








Intake and Output: 


 











 06/07/18 06/08/18





 18:59 06:59


 


Output Total 650 


 


Balance -650 














- Medications


Medications: 


 Current Medications





Acetaminophen (Tylenol 325mg Tab)  650 mg PO Q6 PRN


   PRN Reason: Fever >100.4 F


Acetaminophen (Tylenol 650 Mg Supp)  650 mg CT Q4H PRN


   PRN Reason: Fever >100.4 F


Albuterol/Ipratropium (Duoneb 3 Mg/0.5 Mg (3 Ml) Ud)  3 ml INH RQ6 [facility]


   Last Admin: 06/07/18 19:31 Dose:  Not Given


Benzocaine/Menthol (Cepacol Sore Throat)  1 emmanuel MT Q4 PRN


   PRN Reason: Sore Throat


   Last Admin: 06/06/18 13:06 Dose:  1 emmanuel


Docusate Sodium (Colace)  100 mg PO DAILY [facility]


   Stop: 06/09/18 10:01


   Last Admin: 06/07/18 09:27 Dose:  Not Given


Famotidine (Pepcid)  20 mg PO Q12 [facility]


Heparin Sodium (Porcine) (Heparin)  5,000 units SC Q8 [facility]


   Last Admin: 06/07/18 22:49 Dose:  5,000 units


Hydromorphone HCl (Dilaudid)  0.5 mg IVP Q4H PRN


   PRN Reason: Pain, moderate (4-7)


Sodium Chloride (Sodium Chloride 0.9%)  1,000 mls @ 100 mls/hr IV .Q10H [facility]


   Last Admin: 06/07/18 19:52 Dose:  100 mls/hr


Lidocaine (Lidoderm)  1 ea TD DAILY@0800 [facility]


   Last Admin: 06/07/18 08:13 Dose:  1 ea


Ondansetron HCl (Zofran Inj)  4 mg IV Q4 PRN


   PRN Reason: Nausea/Vomiting


   Last Admin: 06/07/18 12:11 Dose:  4 mg


Pantoprazole Sodium (Protonix Inj)  40 mg IVP DAILY [facility]


Tramadol HCl (Ultram)  50 mg PO TID PRN


   PRN Reason: Pain, moderate (4-7)


   Last Admin: 06/06/18 12:51 Dose:  50 mg











- Labs


Labs: 


 





 06/07/18 08:00 





 06/07/18 08:00 











Assessment and Plan





- Assessment and Plan (Free Text)


Plan: 


Patient was seen and examined at bedside with the resident staff later this 

evening around 6:54 PM.  He has had a couple episodes of spontaneous bilious 

emesis after a trial of liquid full diet.  He continues to have either ileus or 

obstruction after removal of NG tube a couple of days ago however he is passing 

gas and having bowel movements although because of his persistent nausea and 

vomiting we are going to obtain a CT abdomen pelvis with p.o. contrast to rule 

out any ileus obstruction leak or fluid collection.  He continues to otherwise 

look well, not toxic appearing. white blood cell count is normal and his 

hemodynamic status is normal his abdomen is soft not exquisitely tender and 

continues to be distended no more so than it was the last couple of days.  I 

suspect he has continued postop ileus but we will proceed with the plan above I 

discussed the plan of care at length with the patient wife and the patient's 

daughter at the bedside. We will place an NG tube and administer the oral 

contrast through the NG tube

## 2018-06-08 LAB
ALBUMIN SERPL-MCNC: 3.2 G/DL (ref 3.5–5)
ALBUMIN/GLOB SERPL: 1.1 {RATIO} (ref 1–2.1)
ALT SERPL-CCNC: 29 U/L (ref 21–72)
AST SERPL-CCNC: 38 U/L (ref 17–59)
BASOPHILS # BLD AUTO: 0 K/UL (ref 0–0.2)
BASOPHILS NFR BLD: 0.2 % (ref 0–2)
BUN SERPL-MCNC: 26 MG/DL (ref 9–20)
CALCIUM SERPL-MCNC: 7.9 MG/DL (ref 8.6–10.4)
EOSINOPHIL # BLD AUTO: 0 K/UL (ref 0–0.7)
EOSINOPHIL NFR BLD: 0 % (ref 0–4)
ERYTHROCYTE [DISTWIDTH] IN BLOOD BY AUTOMATED COUNT: 14.5 % (ref 11.5–14.5)
GFR NON-AFRICAN AMERICAN: > 60
HGB BLD-MCNC: 14.1 G/DL (ref 12–18)
LG PLATELETS BLD QL SMEAR: PRESENT
LYMPHOCYTE: 11 % (ref 20–40)
LYMPHOCYTES # BLD AUTO: 0.7 K/UL (ref 1–4.3)
LYMPHOCYTES NFR BLD AUTO: 7.7 % (ref 20–40)
MCH RBC QN AUTO: 28.9 PG (ref 27–31)
MCHC RBC AUTO-ENTMCNC: 33.6 G/DL (ref 33–37)
MCV RBC AUTO: 86.1 FL (ref 80–94)
MONOCYTE: 9 % (ref 0–10)
MONOCYTES # BLD: 0.6 K/UL (ref 0–0.8)
MONOCYTES NFR BLD: 6.9 % (ref 0–10)
NEUTROPHILS # BLD: 7.4 K/UL (ref 1.8–7)
NEUTROPHILS NFR BLD AUTO: 49 % (ref 50–75)
NEUTROPHILS NFR BLD AUTO: 85.2 % (ref 50–75)
NEUTS BAND NFR BLD: 29 % (ref 0–2)
NRBC BLD AUTO-RTO: 0 % (ref 0–2)
PLATELET # BLD EST: NORMAL 10*3/UL
PLATELET # BLD: 309 K/UL (ref 130–400)
PMV BLD AUTO: 9.7 FL (ref 7.2–11.7)
RBC # BLD AUTO: 4.86 MIL/UL (ref 4.4–5.9)
RBC MORPH BLD: NORMAL
TOTAL CELLS COUNTED BLD: 100
TOXIC GRANULES BLD QL SMEAR: PRESENT
VARIANT LYMPHS NFR BLD MANUAL: 2 % (ref 0–0)
WBC # BLD AUTO: 8.7 K/UL (ref 4.8–10.8)

## 2018-06-08 RX ADMIN — IPRATROPIUM BROMIDE AND ALBUTEROL SULFATE SCH: .5; 3 SOLUTION RESPIRATORY (INHALATION) at 13:25

## 2018-06-08 RX ADMIN — TAZOBACTAM SODIUM AND PIPERACILLIN SODIUM SCH MLS/HR: 375; 3 INJECTION, SOLUTION INTRAVENOUS at 16:26

## 2018-06-08 RX ADMIN — TAZOBACTAM SODIUM AND PIPERACILLIN SODIUM SCH MLS/HR: 375; 3 INJECTION, SOLUTION INTRAVENOUS at 10:33

## 2018-06-08 RX ADMIN — TAZOBACTAM SODIUM AND PIPERACILLIN SODIUM SCH MLS/HR: 375; 3 INJECTION, SOLUTION INTRAVENOUS at 21:38

## 2018-06-08 RX ADMIN — HYDROMORPHONE HYDROCHLORIDE PRN MG: 1 INJECTION, SOLUTION INTRAMUSCULAR; INTRAVENOUS; SUBCUTANEOUS at 21:55

## 2018-06-08 RX ADMIN — IPRATROPIUM BROMIDE AND ALBUTEROL SULFATE SCH ML: .5; 3 SOLUTION RESPIRATORY (INHALATION) at 20:07

## 2018-06-08 RX ADMIN — HYDROMORPHONE HYDROCHLORIDE PRN MG: 1 INJECTION, SOLUTION INTRAMUSCULAR; INTRAVENOUS; SUBCUTANEOUS at 03:50

## 2018-06-08 RX ADMIN — IPRATROPIUM BROMIDE AND ALBUTEROL SULFATE SCH ML: .5; 3 SOLUTION RESPIRATORY (INHALATION) at 07:40

## 2018-06-08 RX ADMIN — IPRATROPIUM BROMIDE AND ALBUTEROL SULFATE SCH: .5; 3 SOLUTION RESPIRATORY (INHALATION) at 01:15

## 2018-06-08 NOTE — CP.PCM.PN
<RdoMohit - Last Filed: 06/08/18 08:05>





Subjective





- Date & Time of Evaluation


Date of Evaluation: 06/08/18


Time of Evaluation: 08:05





- Subjective


Subjective: 





Surgery 





Pt seen and examined with attending. Pt had large amount of billious emesis 

yesterday. NGT placed. output 2L bilious. CT taken. + ambulate, + BM, + void. 





Objective





- Vital Signs/Intake and Output


Vital Signs (last 24 hours): 


 











Temp Pulse Resp BP Pulse Ox


 


 98.6 F   106 H  20   117/76   94 L


 


 06/08/18 07:00  06/08/18 07:00  06/08/18 07:00  06/08/18 07:00  06/08/18 07:00








Intake and Output: 


 











 06/08/18 06/08/18





 06:59 18:59


 


Intake Total 1050 


 


Output Total 2950 


 


Balance -1900 














- Medications


Medications: 


 Current Medications





Acetaminophen (Tylenol 325mg Tab)  650 mg PO Q6 PRN


   PRN Reason: Fever >100.4 F


Albuterol/Ipratropium (Duoneb 3 Mg/0.5 Mg (3 Ml) Ud)  3 ml INH RQ6 [facility]


   Last Admin: 06/08/18 01:15 Dose:  Not Given


Benzocaine/Menthol (Cepacol Sore Throat)  1 emmanuel MT Q4 PRN


   PRN Reason: Sore Throat


   Last Admin: 06/06/18 13:06 Dose:  1 emmanuel


Docusate Sodium (Colace)  100 mg PO DAILY [facility]


   Stop: 06/09/18 10:01


   Last Admin: 06/07/18 09:27 Dose:  Not Given


Famotidine (Pepcid)  20 mg PO Q12 [facility]


   Last Admin: 06/07/18 23:45 Dose:  Not Given


Heparin Sodium (Porcine) (Heparin)  5,000 units SC Q8 [facility]


   Last Admin: 06/08/18 06:05 Dose:  5,000 units


Hydromorphone HCl (Dilaudid)  0.5 mg IVP Q4H PRN


   PRN Reason: Pain, moderate (4-7)


   Last Admin: 06/08/18 03:50 Dose:  0.5 mg


Sodium Chloride (Sodium Chloride 0.9%)  1,000 mls @ 100 mls/hr IV .Q10H [facility]


   Last Admin: 06/08/18 06:10 Dose:  100 mls/hr


Lidocaine (Lidoderm)  1 ea TD DAILY@0800 [facility]


   Last Admin: 06/07/18 08:13 Dose:  1 ea


Metoclopramide HCl (Reglan)  5 mg IVP Q6 [facility]


   Last Admin: 06/08/18 06:05 Dose:  5 mg


Ondansetron HCl (Zofran Inj)  4 mg IV Q4 PRN


   PRN Reason: Nausea/Vomiting


   Last Admin: 06/07/18 12:11 Dose:  4 mg


Pantoprazole Sodium (Protonix Inj)  40 mg IVP DAILY [facility]


Tramadol HCl (Ultram)  50 mg PO TID PRN


   PRN Reason: Pain, moderate (4-7)


   Last Admin: 06/06/18 12:51 Dose:  50 mg











- Labs


Labs: 


 





 06/07/18 08:00 





 06/07/18 08:00 











- Constitutional


Appears: In Acute Distress





- Head Exam


Head Exam: ATRAUMATIC, NORMAL INSPECTION, NORMOCEPHALIC





- Eye Exam


Eye Exam: EOMI, Normal appearance, PERRL


Pupil Exam: NORMAL ACCOMODATION, PERRL





- ENT Exam


ENT Exam: Mucous Membranes Moist, Normal Exam





- Neck Exam


Neck Exam: Full ROM, Normal Inspection.  absent: Lymphadenopathy





- Respiratory Exam


Respiratory Exam: Clear to Ausculation Bilateral.  absent: Wheezes, Respiratory 

Distress





- Cardiovascular Exam


Cardiovascular Exam: Tachycardia, REGULAR RHYTHM, +S1, +S2.  absent: Murmur





- GI/Abdominal Exam


GI & Abdominal Exam: Soft, Normal Bowel Sounds.  absent: Distended, Firm, 

Guarding, Rigid, Tenderness





- Extremities Exam


Extremities Exam: Full ROM, Normal Capillary Refill, Normal Inspection.  absent

: Joint Swelling, Pedal Edema





- Back Exam


Back Exam: NORMAL INSPECTION





- Neurological Exam


Neurological Exam: Alert, Awake, CN II-XII Intact, Normal Gait, Oriented x3





- Psychiatric Exam


Psychiatric exam: Normal Affect, Normal Mood





- Skin


Skin Exam: Dry, Intact, Normal Color, Warm





Assessment and Plan





- Assessment and Plan (Free Text)


Assessment: 


POD 8 s/p R hemicolectomy : post op ileus 





-US for DVT


-NPO


-NGT


-PPN


-ambulate


-IS





DW Dr. Villalobos 





<Jas Villalobos - Last Filed: 06/08/18 08:33>





Objective





- Vital Signs/Intake and Output


Vital Signs (last 24 hours): 


 











Temp Pulse Resp BP Pulse Ox


 


 98.6 F   106 H  20   117/76   94 L


 


 06/08/18 07:00  06/08/18 07:00  06/08/18 07:00  06/08/18 07:00  06/08/18 07:00








Intake and Output: 


 











 06/08/18 06/08/18





 06:59 18:59


 


Intake Total 1050 


 


Output Total 2950 


 


Balance -1900 














- Medications


Medications: 


 Current Medications





Acetaminophen (Tylenol 325mg Tab)  650 mg PO Q6 PRN


   PRN Reason: Fever >100.4 F


Albuterol/Ipratropium (Duoneb 3 Mg/0.5 Mg (3 Ml) Ud)  3 ml INH RQ6 [facility]


   Last Admin: 06/08/18 07:40 Dose:  3 ml


Benzocaine/Menthol (Cepacol Sore Throat)  1 emmanuel MT Q4 PRN


   PRN Reason: Sore Throat


   Last Admin: 06/06/18 13:06 Dose:  1 emmanuel


Docusate Sodium (Colace)  100 mg PO DAILY [facility]


   Stop: 06/09/18 10:01


   Last Admin: 06/07/18 09:27 Dose:  Not Given


Famotidine (Pepcid)  20 mg PO Q12 [facility]


   Last Admin: 06/07/18 23:45 Dose:  Not Given


Heparin Sodium (Porcine) (Heparin)  5,000 units SC Q8 [facility]


   Last Admin: 06/08/18 06:05 Dose:  5,000 units


Hydromorphone HCl (Dilaudid)  0.5 mg IVP Q4H PRN


   PRN Reason: Pain, moderate (4-7)


   Last Admin: 06/08/18 03:50 Dose:  0.5 mg


Sodium Chloride (Sodium Chloride 0.9%)  1,000 mls @ 100 mls/hr IV .Q10H [facility]


   Last Admin: 06/08/18 06:10 Dose:  100 mls/hr


Lidocaine (Lidoderm)  1 ea TD DAILY@0800 [facility]


   Last Admin: 06/08/18 08:06 Dose:  1 ea


Metoclopramide HCl (Reglan)  5 mg IVP Q6 [facility]


   Last Admin: 06/08/18 06:05 Dose:  5 mg


Ondansetron HCl (Zofran Inj)  4 mg IV Q4 PRN


   PRN Reason: Nausea/Vomiting


   Last Admin: 06/07/18 12:11 Dose:  4 mg


Pantoprazole Sodium (Protonix Inj)  40 mg IVP DAILY [facility]


Tramadol HCl (Ultram)  50 mg PO TID PRN


   PRN Reason: Pain, moderate (4-7)


   Last Admin: 06/06/18 12:51 Dose:  50 mg











- Labs


Labs: 


 





 06/08/18 08:13 





 06/07/18 08:00 











Assessment and Plan





- Assessment and Plan (Free Text)


Plan: 


Patient seen and examined with resident staff. CT study overnight reviewed and 

discussed with V rads radiologist. Early post-op sbo likely. No leak or 

intrabdominal fluid collections/abscess. Patient subjectively feels much better 

since NGT passing some flatus. Had 2 liquid bm's overnight. Abd soft, less 

distended. No tenderness. Cont. NPO. NGT LIWS. Will start PPN. Does complain of 

some difficulty breathing.  Has basilar atelectasis on scan. Also has history 

of ISMAEL, and PE. Iodine allergy. Will get chest PT and nebs, LE Duplex to r/o 

VTE.

## 2018-06-08 NOTE — VASCLAB
PROCEDURE:  Lower Extremity Venous Duplex Exam.



HISTORY:

r/o DVT PAIN IN LEGS



PRIORS:

None. 



TECHNIQUE:

Bilateral common femoral, femoral, popliteal and posterior tibial, 

peroneal and great saphenous veins were evaluated. Flow was assessed 

with color Doppler, compressibility, assessment of phasic flow and 

augmentation response.



Report prepared by   Qi Miranda, CANDIDA, RVS



FINDINGS:



RIGHT:

1. Common Femoral Vein: 



1.1. Compressibility - Fully compressible: Thrombus -  None : Flow - 

Phasic: Augmentation -Normal: Reflux - None.



2. Femoral Vein:



2.1. Compressibility - Fully compressible: Thrombus -  None : Flow - 

Phasic: Augmentation -Normal: Reflux - None.



3. Popliteal Vein: 



3.1. Compressibility - Fully compressible: Thrombus - None :  Flow - 

Phasic: Augmentation -Normal: Reflux - None.



4. Posterior Tibial Vein: 



4.1. Compressibility - Fully compressible: Thrombus -  None: Flow - 

Phasic: Augmentation -Normal: Reflux - None.



5. Peroneal Vein:



5.1. Compressibility - Fully compressible: Thrombus -  None: Flow - 

Phasic: Augmentation -Normal: Reflux - None.



6. Great Saphenous Vein:

6.1. Compressibility - Fully compressible: Thrombus - None: Flow - 

Phasic: Augmentation - Normal: Reflux - None.





LEFT:

1. Common Femoral Vein:



1.1.  Compressibility - Fully compressible: Thrombus -  None: Flow - 

Phasic: Augmentation -Normal: Reflux - None.



2. Femoral Vein:



2.1.  Compressibility - Fully compressible: Thrombus -  None: Flow - 

Phasic: Augmentation -Normal: Reflux - None.



3. Popliteal Vein:



3.1.  Compressibility - Fully compressible: Thrombus -  None : Flow - 

Phasic: Augmentation -Normal: Reflux - None.



4. Posterior Tibial Vein:



4.1.  Compressibility - Fully compressible: Thrombus -  None: Flow - 

Phasic: Augmentation -Normal: Reflux - None.



5. Peroneal Vein:



5.1.  Compressibility - Fully compressible: Thrombus -  None: Flow - 

Phasic: Augmentation -Normal: Reflux - None.



6. Great Saphenous Vein:

6.1.  Compressibility - Fully compressible: Thrombus -  None: Flow - 

Phasic: Augmentation - Normal: Reflux - None.





OTHER FINDINGS:  Right: None significant.



Left: None significant.



IMPRESSION:

Right: 



No evidence of deep or superficial vein thrombosis of the right lower 

extremity. Normal valve function noted of the right side.     



Left: 



No evidence of deep or superficial vein thrombosis of the left lower 

extremity. Normal valve function noted of the left side.

## 2018-06-09 LAB
ALBUMIN SERPL-MCNC: 3.3 G/DL (ref 3.5–5)
ALBUMIN/GLOB SERPL: 1.1 {RATIO} (ref 1–2.1)
ALT SERPL-CCNC: 25 U/L (ref 21–72)
AST SERPL-CCNC: 38 U/L (ref 17–59)
BASOPHILS # BLD AUTO: 0 K/UL (ref 0–0.2)
BASOPHILS NFR BLD: 0.3 % (ref 0–2)
BUN SERPL-MCNC: 43 MG/DL (ref 9–20)
CALCIUM SERPL-MCNC: 8.1 MG/DL (ref 8.6–10.4)
EOSINOPHIL # BLD AUTO: 0 K/UL (ref 0–0.7)
EOSINOPHIL NFR BLD: 0.2 % (ref 0–4)
ERYTHROCYTE [DISTWIDTH] IN BLOOD BY AUTOMATED COUNT: 14.5 % (ref 11.5–14.5)
GFR NON-AFRICAN AMERICAN: 43
HGB BLD-MCNC: 14.1 G/DL (ref 12–18)
LYMPHOCYTES # BLD AUTO: 0.9 K/UL (ref 1–4.3)
LYMPHOCYTES NFR BLD AUTO: 10.2 % (ref 20–40)
MCH RBC QN AUTO: 28.7 PG (ref 27–31)
MCHC RBC AUTO-ENTMCNC: 33.7 G/DL (ref 33–37)
MCV RBC AUTO: 85.2 FL (ref 80–94)
MONOCYTES # BLD: 0.6 K/UL (ref 0–0.8)
MONOCYTES NFR BLD: 7.1 % (ref 0–10)
NEUTROPHILS # BLD: 6.9 K/UL (ref 1.8–7)
NEUTROPHILS NFR BLD AUTO: 82.2 % (ref 50–75)
NRBC BLD AUTO-RTO: 0 % (ref 0–2)
PLATELET # BLD: 317 K/UL (ref 130–400)
PMV BLD AUTO: 9.8 FL (ref 7.2–11.7)
RBC # BLD AUTO: 4.9 MIL/UL (ref 4.4–5.9)
WBC # BLD AUTO: 8.4 K/UL (ref 4.8–10.8)

## 2018-06-09 RX ADMIN — TAZOBACTAM SODIUM AND PIPERACILLIN SODIUM SCH MLS/HR: 375; 3 INJECTION, SOLUTION INTRAVENOUS at 16:31

## 2018-06-09 RX ADMIN — HYDROMORPHONE HYDROCHLORIDE PRN MG: 1 INJECTION, SOLUTION INTRAMUSCULAR; INTRAVENOUS; SUBCUTANEOUS at 03:59

## 2018-06-09 RX ADMIN — TAZOBACTAM SODIUM AND PIPERACILLIN SODIUM SCH MLS/HR: 375; 3 INJECTION, SOLUTION INTRAVENOUS at 10:11

## 2018-06-09 RX ADMIN — IPRATROPIUM BROMIDE AND ALBUTEROL SULFATE SCH: .5; 3 SOLUTION RESPIRATORY (INHALATION) at 01:18

## 2018-06-09 RX ADMIN — IPRATROPIUM BROMIDE AND ALBUTEROL SULFATE SCH ML: .5; 3 SOLUTION RESPIRATORY (INHALATION) at 13:29

## 2018-06-09 RX ADMIN — IPRATROPIUM BROMIDE AND ALBUTEROL SULFATE SCH ML: .5; 3 SOLUTION RESPIRATORY (INHALATION) at 19:29

## 2018-06-09 RX ADMIN — HYDROMORPHONE HYDROCHLORIDE PRN MG: 1 INJECTION, SOLUTION INTRAMUSCULAR; INTRAVENOUS; SUBCUTANEOUS at 10:09

## 2018-06-09 RX ADMIN — TAZOBACTAM SODIUM AND PIPERACILLIN SODIUM SCH MLS/HR: 375; 3 INJECTION, SOLUTION INTRAVENOUS at 22:05

## 2018-06-09 RX ADMIN — TAZOBACTAM SODIUM AND PIPERACILLIN SODIUM SCH MLS/HR: 375; 3 INJECTION, SOLUTION INTRAVENOUS at 03:59

## 2018-06-09 RX ADMIN — IPRATROPIUM BROMIDE AND ALBUTEROL SULFATE SCH ML: .5; 3 SOLUTION RESPIRATORY (INHALATION) at 08:04

## 2018-06-09 RX ADMIN — HYDROMORPHONE HYDROCHLORIDE PRN MG: 1 INJECTION, SOLUTION INTRAMUSCULAR; INTRAVENOUS; SUBCUTANEOUS at 23:12

## 2018-06-09 NOTE — CP.PCM.PN
Subjective





- Date & Time of Evaluation


Date of Evaluation: 06/09/18


Time of Evaluation: 09:54





- Subjective


Subjective: 





Surgery: Dr. Dao (covering for Dr. Villalobos)





Pt seen and examined. No acute overnight events. Pt states he feels well & pain 

is well controlled. He admits to having a small BM this morning & +flatus. He 

denies N/V, F/C. Pt is ambulating around the hallway & denies any other 

complaints at this time. 





Objective





- Vital Signs/Intake and Output


Vital Signs (last 24 hours): 


 











Temp Pulse Resp BP Pulse Ox


 


 97.7 F   86   20   110/67   91 L


 


 06/09/18 07:46  06/09/18 07:46  06/09/18 07:46  06/09/18 07:46  06/09/18 07:46








Intake and Output: 


 











 06/09/18 06/09/18





 06:59 18:59


 


Intake Total 740 


 


Output Total 2400 


 


Balance -1660 














- Medications


Medications: 


 Current Medications





Acetaminophen (Tylenol 325mg Tab)  650 mg PO Q6 PRN


   PRN Reason: Fever >100.4 F


Albuterol/Ipratropium (Duoneb 3 Mg/0.5 Mg (3 Ml) Ud)  3 ml INH RQ6 [facility]


   Last Admin: 06/09/18 08:04 Dose:  3 ml


Benzocaine/Menthol (Cepacol Sore Throat)  1 emmanuel MT Q4 PRN


   PRN Reason: Sore Throat


   Last Admin: 06/06/18 13:06 Dose:  1 emmanuel


Docusate Sodium (Colace)  100 mg PO DAILY [facility]


   Stop: 06/09/18 10:01


   Last Admin: 06/07/18 09:27 Dose:  Not Given


Famotidine (Pepcid)  20 mg PO Q12 [facility]


   Last Admin: 06/07/18 23:45 Dose:  Not Given


Heparin Sodium (Porcine) (Heparin)  5,000 units SC Q8 [facility]


   Last Admin: 06/09/18 05:27 Dose:  5,000 units


Hydromorphone HCl (Dilaudid)  0.5 mg IVP Q4H [facility]


   PRN Reason: Pain, moderate (4-7)


   Last Admin: 06/09/18 03:59 Dose:  0.5 mg


Piperacillin Sod/Tazobactam Sod (Zosyn 3.375 Gm Iv Premix)  3.375 gm in 50 mls 

@ 100 mls/hr IVPB Q6H [facility]


   PRN Reason: Protocol


   Last Admin: 06/09/18 03:59 Dose:  100 mls/hr


Sodium Chloride 15 meq/Potassium Chloride 30 meq/Magnesium Sulfate 3 meq/

Calcium Gluconate 4.5 meq/Multivitamins/Vitamin C 10 ml/Amino Acids  1,039.1663 

mls @ 63 mls/hr IV .[id number] [facility]


   Stop: 06/09/18 10:29


   Last Admin: 06/09/18 04:07 Dose:  63 mls/hr


Sodium Chloride 15 meq/Potassium Chloride 30 meq/Magnesium Sulfate 3 meq/

Calcium Gluconate 4.5 meq/Amino Acids  1,029.1663 mls @ 63 mls/hr IV .[id number] 

[facility]


   Stop: 06/09/18 17:59


Fat Emulsion Intravenous (Intralipid 20%)  500 mls @ 60 mls/hr IV QOD@1800 [facility]


   Last Admin: 06/08/18 18:08 Dose:  60 mls/hr


Sodium Chloride 15 meq/Potassium Chloride 30 meq/Magnesium Sulfate 3 meq/

Calcium Gluconate 4.5 meq/Multivitamins/Vitamin C 10 ml/Amino Acids  1,039.1663 

mls @ 63 mls/hr IV .[id number] [facility]


   Stop: 06/10/18 10:29


Sodium Chloride 15 meq/Potassium Chloride 30 meq/Magnesium Sulfate 3 meq/

Calcium Gluconate 4.5 meq/Amino Acids  1,029.1663 mls @ 63 mls/hr IV .[id number] 

[facility]


   Stop: 06/10/18 17:59


Lidocaine (Lidoderm)  1 ea TD DAILY@0800 [facility]


   Last Admin: 06/09/18 08:34 Dose:  1 ea


Metoclopramide HCl (Reglan)  5 mg IVP Q6 [facility]


   Last Admin: 06/09/18 05:27 Dose:  5 mg


Ondansetron HCl (Zofran Inj)  4 mg IV Q4 PRN


   PRN Reason: Nausea/Vomiting


   Last Admin: 06/07/18 12:11 Dose:  4 mg


Pantoprazole Sodium (Protonix Inj)  40 mg IVP DAILY [facility]


   Last Admin: 06/08/18 10:33 Dose:  40 mg


Tramadol HCl (Ultram)  50 mg PO TID PRN


   PRN Reason: Pain, moderate (4-7)


   Last Admin: 06/06/18 12:51 Dose:  50 mg











- Labs


Labs: 


 





 06/09/18 07:34 





 06/09/18 07:34 











- Constitutional


Appears: Well, No Acute Distress





- Head Exam


Head Exam: ATRAUMATIC, NORMOCEPHALIC





- Eye Exam


Eye Exam: Normal appearance





- ENT Exam


ENT Exam: Mucous Membranes Moist


Additional comments: 





NGT in place with bilious drainage; 700cc/12hrs. 





- Respiratory Exam


Respiratory Exam: NORMAL BREATHING PATTERN





- Cardiovascular Exam


Cardiovascular Exam: RRR





- GI/Abdominal Exam


GI & Abdominal Exam: Soft.  absent: Distended, Guarding, Tenderness, Rebound





- Extremities Exam


Extremities Exam: Full ROM





- Neurological Exam


Neurological Exam: Alert, Awake, Oriented x3





- Skin


Skin Exam: Dry, Warm





Assessment and Plan





- Assessment and Plan (Free Text)


Assessment: 





72M s/p R Laparoscopic Hemicolectomy; POD#9 


Plan: 





- will try clamping trials with NGT & see how pt tolerates 


- cont PPN in the meantime


- cont IV ABX


- US b/l LE negative for DVT


- Encourage ambulation & incentive spirometry


- DVT PPx


- d/w Dr. Feliberto Hirsch, PGY-3

## 2018-06-10 LAB
ALBUMIN SERPL-MCNC: 3.6 G/DL (ref 3.5–5)
ALBUMIN/GLOB SERPL: 1.2 {RATIO} (ref 1–2.1)
ALT SERPL-CCNC: 34 U/L (ref 21–72)
AST SERPL-CCNC: 36 U/L (ref 17–59)
BACTERIA #/AREA URNS HPF: (no result) /[HPF]
BASOPHILS # BLD AUTO: 0 K/UL (ref 0–0.2)
BASOPHILS NFR BLD: 0.2 % (ref 0–2)
BILIRUB UR-MCNC: NEGATIVE MG/DL
BUN SERPL-MCNC: 46 MG/DL (ref 9–20)
CALCIUM SERPL-MCNC: 8.3 MG/DL (ref 8.6–10.4)
EOSINOPHIL # BLD AUTO: 0.1 K/UL (ref 0–0.7)
EOSINOPHIL NFR BLD: 0.9 % (ref 0–4)
ERYTHROCYTE [DISTWIDTH] IN BLOOD BY AUTOMATED COUNT: 14.8 % (ref 11.5–14.5)
GFR NON-AFRICAN AMERICAN: 35
GLUCOSE UR STRIP-MCNC: NORMAL MG/DL
HGB BLD-MCNC: 14.3 G/DL (ref 12–18)
LEUKOCYTE ESTERASE UR-ACNC: (no result) LEU/UL
LYMPHOCYTES # BLD AUTO: 0.8 K/UL (ref 1–4.3)
LYMPHOCYTES NFR BLD AUTO: 10.7 % (ref 20–40)
MCH RBC QN AUTO: 28.4 PG (ref 27–31)
MCHC RBC AUTO-ENTMCNC: 33.2 G/DL (ref 33–37)
MCV RBC AUTO: 85.6 FL (ref 80–94)
MONOCYTES # BLD: 0.7 K/UL (ref 0–0.8)
MONOCYTES NFR BLD: 9.8 % (ref 0–10)
NEUTROPHILS # BLD: 5.7 K/UL (ref 1.8–7)
NEUTROPHILS NFR BLD AUTO: 78.4 % (ref 50–75)
NRBC BLD AUTO-RTO: 0.1 % (ref 0–2)
PH UR STRIP: 5 [PH] (ref 5–8)
PLATELET # BLD: 358 K/UL (ref 130–400)
PMV BLD AUTO: 9.8 FL (ref 7.2–11.7)
PROT UR STRIP-MCNC: NEGATIVE MG/DL
RBC # BLD AUTO: 5.04 MIL/UL (ref 4.4–5.9)
RBC # UR STRIP: (no result) /UL
SP GR UR STRIP: 1.02 (ref 1–1.03)
SQUAMOUS EPITHIAL: < 1 /HPF (ref 0–5)
UROBILINOGEN UR-MCNC: NORMAL MG/DL (ref 0.2–1)
WBC # BLD AUTO: 7.3 K/UL (ref 4.8–10.8)

## 2018-06-10 RX ADMIN — HYDROMORPHONE HYDROCHLORIDE PRN MG: 1 INJECTION, SOLUTION INTRAMUSCULAR; INTRAVENOUS; SUBCUTANEOUS at 12:59

## 2018-06-10 RX ADMIN — POTASSIUM CHLORIDE SCH: 2 INJECTION, SOLUTION, CONCENTRATE INTRAVENOUS at 06:08

## 2018-06-10 RX ADMIN — SODIUM CHLORIDE SCH MLS/HR: 9 INJECTION, SOLUTION INTRAVENOUS at 21:40

## 2018-06-10 RX ADMIN — POTASSIUM CHLORIDE SCH MLS/HR: 2 INJECTION, SOLUTION, CONCENTRATE INTRAVENOUS at 17:10

## 2018-06-10 RX ADMIN — IPRATROPIUM BROMIDE AND ALBUTEROL SULFATE SCH: .5; 3 SOLUTION RESPIRATORY (INHALATION) at 01:13

## 2018-06-10 RX ADMIN — POTASSIUM CHLORIDE SCH MLS/HR: 2 INJECTION, SOLUTION, CONCENTRATE INTRAVENOUS at 13:27

## 2018-06-10 RX ADMIN — SODIUM CHLORIDE SCH MLS/HR: 9 INJECTION, SOLUTION INTRAVENOUS at 19:07

## 2018-06-10 RX ADMIN — HYDROMORPHONE HYDROCHLORIDE PRN MG: 1 INJECTION, SOLUTION INTRAMUSCULAR; INTRAVENOUS; SUBCUTANEOUS at 03:53

## 2018-06-10 RX ADMIN — SODIUM CHLORIDE SCH MLS/HR: 9 INJECTION, SOLUTION INTRAVENOUS at 11:28

## 2018-06-10 RX ADMIN — IPRATROPIUM BROMIDE AND ALBUTEROL SULFATE SCH ML: .5; 3 SOLUTION RESPIRATORY (INHALATION) at 07:48

## 2018-06-10 RX ADMIN — IPRATROPIUM BROMIDE AND ALBUTEROL SULFATE SCH: .5; 3 SOLUTION RESPIRATORY (INHALATION) at 13:48

## 2018-06-10 RX ADMIN — TAZOBACTAM SODIUM AND PIPERACILLIN SODIUM SCH MLS/HR: 375; 3 INJECTION, SOLUTION INTRAVENOUS at 03:56

## 2018-06-10 NOTE — CT
PROCEDURE:  CT Abdomen and Pelvis without intravenous contrast



HISTORY:

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



COMPARISON:

Comparison is made with the previous study dated 06/07/2018



TECHNIQUE:

Axial and reformatted coronal and sagittal CT images of the abdomen 

and pelvis were obtained without IV contrast administration.. 



Contrast dose: 0 IV contrast. Diluted water-soluble contrast was 

given through the rectum.



Radiation dose:



Total exam DLP = 835.69 mGy-cm.



This CT exam was performed using one or more of the following dose 

reduction techniques: Automated exposure control, adjustment of the 

mA and/or kV according to patient size, and/or use of iterative 

reconstruction technique.



FINDINGS:



LOWER THORAX:

Bibasilar small [place] CT's are noted may represent atelectasis.  The 

possibility of pneumonia is less likely. No evidence of pleural or 

pericardial effusion. 



LIVER:

No significant interval change in the liver noted since the previous 

exam.  



GALLBLADDER AND BILE DUCTS:

Mildly distended gallbladder without CT evidence of acute 

cholecystitis. 



PANCREAS:

Fatty replacement of the pancreas is again noted. The main pancreatic 

duct is not dilated.



SPLEEN:

Unremarkable. 



ADRENALS:

10.3 millimeter nodule at the right adrenal gland is again noted. 

There is also 10 millimeter low-attenuation nodule at the left 

adrenal gland. 



KIDNEYS AND URETERS:

No significant interval change in the kidneys noted since the 

previous exam.  No evidence of hydronephrosis. 



VASCULATURE:

Unremarkable. No aortic aneurysm. 



BOWEL:

The patient is again status post right hemicolectomy. Ileo colon 

pannus Soto is again noted at the right upper abdomen. There is 

persistent mildly dilated short segment of large bowel loop 

demonstrate pneumatosis and surrounding with small amount of fluid 

and fat stranding in the right upper abdomen. The pneumatosis in the 

wall of this focal dilated large bowel appears more conspicuous in 

the current study. There is also mild dilate a shin of the rest of 

the large bowel up to the rectum. Mildly dilated small bowel loops at 

the mid and lower abdomen demonstrate mild diffuse wall thickening 

are again noted. No definite evidence of enteric contrast 

extravasation.



APPENDIX:

The appendix was resected with the right colon 



PERITONEUM:

There is a trace fluid in the right abdomen.  There are foci of 

extraluminal air seen at the right mid and upper abdomen appears more 

conspicuous compared to the previous exam. 



LYMPH NODES:

Unremarkable. No enlarged lymph nodes. 



BLADDER:

The urinary bladder is not distended. 



REPRODUCTIVE:

Unremarkable. 



BONES:

No acute fracture. 



OTHER FINDINGS:

None.



IMPRESSION:

Interval worsening of dilated large bowel since the previous exam.



Focal dilated large bowel segment demonstrates pneumatosis and 

surrounding with inflammatory changes and trace fluid noted at the 

right mid abdomen. The possibility of ischemic changes in the large 

bowel at the right abdomen should be excluded. Please correlate 

clinically. 



No evidence of enteric contrast extravasation. 



Small droplet of extraluminal air noted at the right mid abdomen 

inferior to the liver border and linear shaped small tract of density 

and air noted again at the right upper abdomen adjacent to the liver.



Persistent dilated small bowel loops.



Otherwise no significant interval change.

## 2018-06-10 NOTE — CP.PCM.PN
Subjective





- Date & Time of Evaluation


Date of Evaluation: 06/10/18


Time of Evaluation: 07:00





- Subjective


Subjective: 





SURGERY NOTE FOR DR. ROBERT EVANS





72M seen and examined at bedside. Patient resting. Denies abdominal pain, 

denies nausea and vomiting, admits to discomfort from NGT but tolerable. Denies 

fevers or chills. He continues to pass flatus and have bowel movements. Has 

been ambulating.





Objective





- Vital Signs/Intake and Output


Vital Signs (last 24 hours): 


 











Temp Pulse Resp BP Pulse Ox


 


 98.2 F   90   20   111/69   97 


 


 06/09/18 23:30  06/09/18 23:30  06/09/18 23:30  06/09/18 23:30  06/09/18 23:30








Intake and Output: 


 











 06/10/18 06/10/18





 06:59 18:59


 


Intake Total 740 


 


Output Total 991 


 


Balance -251 














- Medications


Medications: 


 Current Medications





Acetaminophen (Tylenol 325mg Tab)  650 mg PO Q6 PRN


   PRN Reason: Fever >100.4 F


Albuterol/Ipratropium (Duoneb 3 Mg/0.5 Mg (3 Ml) Ud)  3 ml INH RQ6 [facility]


   Last Admin: 06/10/18 01:13 Dose:  Not Given


Benzocaine/Menthol (Cepacol Sore Throat)  1 emmanuel MT Q4 PRN


   PRN Reason: Sore Throat


   Last Admin: 06/06/18 13:06 Dose:  1 emmanuel


Famotidine (Pepcid)  20 mg PO Q12 [facility]


   Last Admin: 06/09/18 22:05 Dose:  20 mg


Heparin Sodium (Porcine) (Heparin)  5,000 units SC Q8 [facility]


   Last Admin: 06/10/18 06:15 Dose:  5,000 units


Hydromorphone HCl (Dilaudid)  0.5 mg IVP Q4H [facility]


   PRN Reason: Pain, moderate (4-7)


   Last Admin: 06/10/18 03:53 Dose:  0.5 mg


Fat Emulsion Intravenous (Intralipid 20%)  500 mls @ 60 mls/hr IV QOD@1800 [facility]


   Last Admin: 06/08/18 18:08 Dose:  60 mls/hr


Sodium Chloride 15 meq/Potassium Chloride 30 meq/Potassium Phosphate 15 mmole/

Magnesium Sulfate 1.5 meq/Calcium Gluconate 4.5 meq/Amino Acids  1,033.7968 mls 

@ 83 mls/hr IV .[id number] [facility]


   Stop: 06/10/18 17:59


   Last Admin: 06/10/18 06:08 Dose:  Not Given


Lidocaine (Lidoderm)  1 ea TD DAILY@0800 [facility]


   Last Admin: 06/09/18 08:34 Dose:  1 ea


Metoclopramide HCl (Reglan)  5 mg IVP Q6 [facility]


   Last Admin: 06/10/18 06:15 Dose:  5 mg


Ondansetron HCl (Zofran Inj)  4 mg IV Q4 PRN


   PRN Reason: Nausea/Vomiting


   Last Admin: 06/07/18 12:11 Dose:  4 mg


Pantoprazole Sodium (Protonix Inj)  40 mg IVP DAILY [facility]


   Last Admin: 06/09/18 10:12 Dose:  40 mg


Tramadol HCl (Ultram)  50 mg PO TID PRN


   PRN Reason: Pain, moderate (4-7)


   Last Admin: 06/06/18 12:51 Dose:  50 mg











- Labs


Labs: 


 





 06/09/18 07:34 





 06/09/18 07:34 











- Constitutional


Appears: Non-toxic, No Acute Distress





- Respiratory Exam


Respiratory Exam: Clear to Ausculation Bilateral, NORMAL BREATHING PATTERN





- Cardiovascular Exam


Cardiovascular Exam: REGULAR RHYTHM, +S1, +S2





- GI/Abdominal Exam


GI & Abdominal Exam: Soft.  absent: Distended, Firm, Guarding, Rigid, Tenderness

, Rebound


Additional comments: 





incision CDI


Site of abdominal devang drain is currently draining feculent material, a change 

from yesterday with was purulent





- Neurological Exam


Neurological Exam: Alert, Awake





- Skin


Skin Exam: Dry, Intact, Normal Color, Warm





Assessment and Plan





- Assessment and Plan (Free Text)


Assessment: 





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 

Enterocutaneous fistula


Plan: 





- NPO


- PPN


- Ostomy appliance on fistula to collect drainage


- NGT Iin place monitor output


- Daily labs, electrolyte replacements


- Continue ambulation


- SCDs


Further recs discuss with Dr. Wilfredo Dietrich, PGY2

## 2018-06-10 NOTE — US
PROCEDURE:  Ultrasound of the Kidneys



HISTORY:

[facility]



COMPARISON:

None available.



TECHNIQUE:

Sonogram of the kidneys.



FINDINGS:



RIGHT KIDNEY:

Measures: 11.6 x 5.5 x 5.1 cm. 



Normal in size, contour and echogenicity. 



No stone, solid mass lesion or hydronephrosis visualized. 



LEFT KIDNEY:

Measures: 11.7 x 5.9 x 5 cm. 



Normal in size, contour and echogenicity. 



No stone, solid mass lesion or hydronephrosis visualized. 



OTHER FINDINGS:

None.



IMPRESSION:

Unremarkable renal sonogram.

## 2018-06-10 NOTE — CP.PCM.CON
History of Present Illness





- History of Present Illness


History of Present Illness: 





72 yrs old male who is s/p Rt hemicolectomy for tubular adenoma about 7 days 

ago is seen in renal consultation because of progressive increase in BUN/Creat 

for the last 3 days.


Pt denied hx/o kidney disease, DM or crdiac problems.


There is no FHx/o DM or kidney dis.





Past Patient History





- Past Medical History & Family History


Past Medical History?: Yes





- Past Social History


Smoking Status: Never Smoked





- CARDIAC


Hx Cardiac Disorders: Yes





- PULMONARY


Hx Respiratory Disorders: Yes


Hx Sleep Apnea: Yes (POSITIVE STUDY, DOES NOT HAVE C PAP MACHINE)





- NEUROLOGICAL


Hx Neurological Disorder: No





- HEENT


Hx HEENT Problems: No





- RENAL


Hx Chronic Kidney Disease: No





- ENDOCRINE/METABOLIC


Hx Endocrine Disorders: No





- HEMATOLOGICAL/ONCOLOGICAL


Hx Blood Disorders: No





- INTEGUMENTARY


Hx Dermatological Problems: No





- MUSCULOSKELETAL/RHEUMATOLOGICAL


Hx Musculoskeletal Disorders: No


Hx Falls: No





- GASTROINTESTINAL


Hx Gastrointestinal Disorders: Yes


Hx Gastritis: Yes


Other/Comment: HX DUODENAL CARCINOID





- GENITOURINARY/GYNECOLOGICAL


Hx Genitourinary Disorders: No





- PSYCHIATRIC


Hx Psychophysiologic Disorder: No


Hx Substance Use: No





- SURGICAL HISTORY


Hx Surgeries: Yes


Hx Orthopedic Surgery: Yes (ORIF LEFT ELBOW W PLATE PLACEMENT)


Other/Comment: HX: COLON POLYPECTOMY





- ANESTHESIA


Hx Anesthesia: Yes


Hx Anesthesia Reactions: No


Hx Malignant Hyperthermia: No


Has any member of the family had a problem w/ anesthesia?: No





Meds


Allergies/Adverse Reactions: 


 Allergies











Allergy/AdvReac Type Severity Reaction Status Date / Time


 


iodine Allergy Severe ANAPHYLAXIS Verified 04/23/18 09:17














- Medications


Medications: 


 Current Medications





Acetaminophen (Tylenol 325mg Tab)  650 mg PO Q6 PRN


   PRN Reason: Fever >100.4 F


Albuterol/Ipratropium (Duoneb 3 Mg/0.5 Mg (3 Ml) Ud)  3 ml INH RQ6 [facility]


   Last Admin: 06/10/18 13:48 Dose:  Not Given


Benzocaine/Menthol (Cepacol Sore Throat)  1 emmanuel MT Q4 PRN


   PRN Reason: Sore Throat


   Last Admin: 06/06/18 13:06 Dose:  1 emmanuel


Famotidine (Pepcid)  20 mg IVP DAILY [facility]


   Last Admin: 06/10/18 11:38 Dose:  20 mg


Heparin Sodium (Porcine) (Heparin)  5,000 units SC Q8 [facility]


   Last Admin: 06/10/18 13:08 Dose:  5,000 units


Hydromorphone HCl (Dilaudid)  0.5 mg IVP Q4H PRN


   PRN Reason: Pain, moderate (4-7)


   Last Admin: 06/10/18 12:59 Dose:  0.5 mg


Sodium Chloride 15 meq/Potassium Chloride 30 meq/Potassium Phosphate 15 mmole/

Magnesium Sulfate 1.5 meq/Calcium Gluconate 4.5 meq/Amino Acids  1,033.7968 mls 

@ 83 mls/hr IV .[id number] [facility]


   Stop: 06/10/18 17:59


   Last Admin: 06/10/18 13:27 Dose:  83 mls/hr


Fat Emulsion Intravenous (Intralipid 20%)  500 mls @ 60 mls/hr IV QOD@1800 [facility]


   Stop: 06/15/18 02:19


Sodium Chloride 15 meq/Potassium Chloride 30 meq/Potassium Phosphate 15 mmole/

Calcium Gluconate 4.5 meq/Amino Acids  1,033.4274 mls @ 83 mls/hr IV .[id number] 

[facility]


   Stop: 06/11/18 18:00


Sodium Chloride 15 meq/Potassium Chloride 30 meq/Potassium Phosphate 15 mmole/

Calcium Gluconate 4.5 meq/Amino Acids  1,033.4274 mls @ 83 mls/hr IV .[id number] 

[facility]


   Stop: 06/11/18 06:30


Piperacillin Sod/Tazobactam (Sod 3.375 gm/ Sodium Chloride)  100 mls @ 200 mls/

hr IVPB Q6H [facility]


   PRN Reason: Protocol


   Last Admin: 06/10/18 11:28 Dose:  200 mls/hr


Sodium Chloride (Sodium Chloride 0.9%)  1,000 mls @ 75 mls/hr IV .[id number] [facility]


   Last Admin: 06/10/18 11:24 Dose:  75 mls/hr


Lidocaine (Lidoderm)  1 ea TD DAILY@0800 [facility]


   Last Admin: 06/10/18 08:58 Dose:  1 ea


Metoclopramide HCl (Reglan)  5 mg IVP Q6 [facility]


   Last Admin: 06/10/18 12:00 Dose:  Not Given


Ondansetron HCl (Zofran Inj)  4 mg IV Q4 PRN


   PRN Reason: Nausea/Vomiting


   Last Admin: 06/07/18 12:11 Dose:  4 mg


Pantoprazole Sodium (Protonix Inj)  40 mg IVP DAILY [facility]


   Last Admin: 06/10/18 10:06 Dose:  40 mg


Tramadol HCl (Ultram)  50 mg PO TID PRN


   PRN Reason: Pain, moderate (4-7)


   Last Admin: 06/06/18 12:51 Dose:  50 mg











Physical Exam





- Constitutional


Appears: No Acute Distress





- Head Exam


Head Exam: ATRAUMATIC, NORMOCEPHALIC





- Eye Exam


Additional comments: 





Conjunctivae pink, sclerae aanicteric





- ENT Exam


ENT Exam: Mucous Membranes Moist





- Neck Exam


Neck exam: Positive for: Normal Inspection





- Respiratory Exam


Respiratory Exam: NORMAL BREATHING PATTERN


Additional comments: 





Lungs clear. No wheezing or rhonchi





- Cardiovascular Exam


Cardiovascular Exam: REGULAR RHYTHM, +S1, +S2


Additional comments: 





No gallops





- GI/Abdominal Exam


GI & Abdominal Exam: Soft


Additional comments: 





Detailed exam deferred because Pt is post-op





- Rectal Exam


Rectal Exam: Deferred





-  Exam


Speculum exam: Cervical Discharge





- Extremities Exam


Additional comments: 





No ECC


No calf tenderness





Results





- Vital Signs


Recent Vital Signs: 


 Last Vital Signs











Temp  97.8 F   06/10/18 07:00


 


Pulse  90   06/10/18 07:00


 


Resp  20   06/10/18 07:00


 


BP  109/72   06/10/18 07:00


 


Pulse Ox  97   06/10/18 07:00














- Labs


Result Diagrams: 


 06/10/18 08:59





 06/10/18 08:59


Labs: 


 Laboratory Results - last 24 hr











  06/10/18 06/10/18





  08:59 08:59


 


WBC  7.3 


 


RBC  5.04 


 


Hgb  14.3 


 


Hct  43.1 


 


MCV  85.6 


 


MCH  28.4 


 


MCHC  33.2 


 


RDW  14.8 H 


 


Plt Count  358 


 


MPV  9.8 


 


Neut % (Auto)  78.4 H 


 


Lymph % (Auto)  10.7 L 


 


Mono % (Auto)  9.8 


 


Eos % (Auto)  0.9 


 


Baso % (Auto)  0.2 


 


Neut # (Auto)  5.7 


 


Lymph # (Auto)  0.8 L 


 


Mono # (Auto)  0.7 


 


Eos # (Auto)  0.1 


 


Baso # (Auto)  0.0 


 


Sodium   146


 


Potassium   3.3 L


 


Chloride   103


 


Carbon Dioxide   30


 


Anion Gap   16


 


BUN   46 H


 


Creatinine   1.9 H


 


Est GFR ( Amer)   43


 


Est GFR (Non-Af Amer)   35


 


Random Glucose   147 H


 


Calcium   8.3 L


 


Phosphorus   4.3


 


Magnesium   3.2 H


 


Total Bilirubin   1.6 H


 


AST   36


 


ALT   34


 


Alkaline Phosphatase   117


 


Total Protein   6.5


 


Albumin   3.6


 


Globulin   2.9


 


Albumin/Globulin Ratio   1.2














Assessment & Plan





- Assessment and Plan (Free Text)


Assessment: 





Acute kidney injury.May be multifactorial e.g dehydration, ATN, . No record is 

noted of hypotension in periop period.


Hidh  serum sodium suggests dehydration. Pt is also receiving PPN which may 

have caused some osmotic diuresis.


On CT scans kidneys are unremarkable.


Plan: 





Continue with IV fluids.


Avoid any potentially nephrotoxic agents


Will order urinalysis & spot lytes


Renal US


Will closely follow pt with you

## 2018-06-11 LAB
ALBUMIN SERPL-MCNC: 2 G/DL (ref 3.5–5)
ALBUMIN SERPL-MCNC: 2.5 G/DL (ref 3.5–5)
ALBUMIN SERPL-MCNC: 3.1 G/DL (ref 3.5–5)
ALBUMIN/GLOB SERPL: 0.8 {RATIO} (ref 1–2.1)
ALBUMIN/GLOB SERPL: 0.9 {RATIO} (ref 1–2.1)
ALBUMIN/GLOB SERPL: 1.2 {RATIO} (ref 1–2.1)
ALT SERPL-CCNC: 42 U/L (ref 21–72)
ALT SERPL-CCNC: 57 U/L (ref 21–72)
ALT SERPL-CCNC: 59 U/L (ref 21–72)
APTT BLD: 30 SECONDS (ref 21–34)
AST SERPL-CCNC: 37 U/L (ref 17–59)
AST SERPL-CCNC: 50 U/L (ref 17–59)
AST SERPL-CCNC: 75 U/L (ref 17–59)
BASOPHILS # BLD AUTO: 0 K/UL (ref 0–0.2)
BASOPHILS NFR BLD: 0.1 % (ref 0–2)
BASOPHILS NFR BLD: 0.3 % (ref 0–2)
BASOPHILS NFR BLD: 0.4 % (ref 0–2)
BUN SERPL-MCNC: 33 MG/DL (ref 9–20)
BUN SERPL-MCNC: 34 MG/DL (ref 9–20)
BUN SERPL-MCNC: 37 MG/DL (ref 9–20)
CALCIUM SERPL-MCNC: 7 MG/DL (ref 8.6–10.4)
CALCIUM SERPL-MCNC: 7.7 MG/DL (ref 8.6–10.4)
CALCIUM SERPL-MCNC: 7.8 MG/DL (ref 8.6–10.4)
EOSINOPHIL # BLD AUTO: 0 K/UL (ref 0–0.7)
EOSINOPHIL # BLD AUTO: 0.1 K/UL (ref 0–0.7)
EOSINOPHIL # BLD AUTO: 0.2 K/UL (ref 0–0.7)
EOSINOPHIL NFR BLD: 0 % (ref 0–4)
EOSINOPHIL NFR BLD: 0.8 % (ref 0–4)
EOSINOPHIL NFR BLD: 2.9 % (ref 0–4)
ERYTHROCYTE [DISTWIDTH] IN BLOOD BY AUTOMATED COUNT: 14.8 % (ref 11.5–14.5)
ERYTHROCYTE [DISTWIDTH] IN BLOOD BY AUTOMATED COUNT: 15 % (ref 11.5–14.5)
ERYTHROCYTE [DISTWIDTH] IN BLOOD BY AUTOMATED COUNT: 15.1 % (ref 11.5–14.5)
GFR NON-AFRICAN AMERICAN: 43
GFR NON-AFRICAN AMERICAN: 46
GFR NON-AFRICAN AMERICAN: 50
HGB BLD-MCNC: 13.4 G/DL (ref 12–18)
HGB BLD-MCNC: 13.5 G/DL (ref 12–18)
HGB BLD-MCNC: 13.7 G/DL (ref 12–18)
HYPOCHROMIC: SLIGHT
INR PPP: 1.1
LG PLATELETS BLD QL SMEAR: PRESENT
LYMPHOCYTE: 3 % (ref 20–40)
LYMPHOCYTES # BLD AUTO: 0.9 K/UL (ref 1–4.3)
LYMPHOCYTES NFR BLD AUTO: 13.2 % (ref 20–40)
LYMPHOCYTES NFR BLD AUTO: 13.4 % (ref 20–40)
LYMPHOCYTES NFR BLD AUTO: 6.3 % (ref 20–40)
MCH RBC QN AUTO: 27.7 PG (ref 27–31)
MCH RBC QN AUTO: 28.1 PG (ref 27–31)
MCH RBC QN AUTO: 29.1 PG (ref 27–31)
MCHC RBC AUTO-ENTMCNC: 32.6 G/DL (ref 33–37)
MCHC RBC AUTO-ENTMCNC: 33 G/DL (ref 33–37)
MCHC RBC AUTO-ENTMCNC: 33.7 G/DL (ref 33–37)
MCV RBC AUTO: 85.1 FL (ref 80–94)
MCV RBC AUTO: 85.1 FL (ref 80–94)
MCV RBC AUTO: 86.3 FL (ref 80–94)
MICROCYTES BLD QL SMEAR: SLIGHT
MONOCYTE: 5 % (ref 0–10)
MONOCYTES # BLD: 0.2 K/UL (ref 0–0.8)
MONOCYTES # BLD: 0.5 K/UL (ref 0–0.8)
MONOCYTES # BLD: 0.5 K/UL (ref 0–0.8)
MONOCYTES NFR BLD: 3.5 % (ref 0–10)
MONOCYTES NFR BLD: 3.7 % (ref 0–10)
MONOCYTES NFR BLD: 6.8 % (ref 0–10)
NEUTROPHILS # BLD: 12.3 K/UL (ref 1.8–7)
NEUTROPHILS # BLD: 5.4 K/UL (ref 1.8–7)
NEUTROPHILS # BLD: 5.5 K/UL (ref 1.8–7)
NEUTROPHILS NFR BLD AUTO: 61 % (ref 50–75)
NEUTROPHILS NFR BLD AUTO: 76.6 % (ref 50–75)
NEUTROPHILS NFR BLD AUTO: 82.1 % (ref 50–75)
NEUTROPHILS NFR BLD AUTO: 89.9 % (ref 50–75)
NEUTS BAND NFR BLD: 31 % (ref 0–2)
NRBC BLD AUTO-RTO: 0 % (ref 0–2)
NRBC BLD AUTO-RTO: 0 % (ref 0–2)
NRBC BLD AUTO-RTO: 0.1 % (ref 0–2)
OVALOCYTES BLD QL SMEAR: SLIGHT
PLATELET # BLD EST: NORMAL 10*3/UL
PLATELET # BLD: 322 K/UL (ref 130–400)
PLATELET # BLD: 361 K/UL (ref 130–400)
PLATELET # BLD: 372 K/UL (ref 130–400)
PMV BLD AUTO: 9.2 FL (ref 7.2–11.7)
PMV BLD AUTO: 9.5 FL (ref 7.2–11.7)
PMV BLD AUTO: 9.5 FL (ref 7.2–11.7)
PROTHROMBIN TIME: 12 SECONDS (ref 9.7–12.2)
RBC # BLD AUTO: 4.69 MIL/UL (ref 4.4–5.9)
RBC # BLD AUTO: 4.77 MIL/UL (ref 4.4–5.9)
RBC # BLD AUTO: 4.88 MIL/UL (ref 4.4–5.9)
TOTAL CELLS COUNTED BLD: 100
WBC # BLD AUTO: 13.7 K/UL (ref 4.8–10.8)
WBC # BLD AUTO: 6.7 K/UL (ref 4.8–10.8)
WBC # BLD AUTO: 7.1 K/UL (ref 4.8–10.8)

## 2018-06-11 PROCEDURE — 0DNU0ZZ RELEASE OMENTUM, OPEN APPROACH: ICD-10-PCS | Performed by: SURGERY

## 2018-06-11 PROCEDURE — 0JB80ZZ EXCISION OF ABDOMEN SUBCUTANEOUS TISSUE AND FASCIA, OPEN APPROACH: ICD-10-PCS | Performed by: SURGERY

## 2018-06-11 PROCEDURE — 0DBE0ZZ EXCISION OF LARGE INTESTINE, OPEN APPROACH: ICD-10-PCS | Performed by: SURGERY

## 2018-06-11 RX ADMIN — SODIUM CHLORIDE SCH MLS: 9 INJECTION, SOLUTION INTRAVENOUS at 18:00

## 2018-06-11 RX ADMIN — SODIUM CHLORIDE SCH MLS: 9 INJECTION, SOLUTION INTRAVENOUS at 10:15

## 2018-06-11 RX ADMIN — SODIUM CHLORIDE SCH MLS/HR: 9 INJECTION, SOLUTION INTRAVENOUS at 03:51

## 2018-06-11 RX ADMIN — HYDROMORPHONE HYDROCHLORIDE PRN MG: 1 INJECTION, SOLUTION INTRAMUSCULAR; INTRAVENOUS; SUBCUTANEOUS at 03:51

## 2018-06-11 RX ADMIN — SODIUM CHLORIDE SCH MLS/HR: 9 INJECTION, SOLUTION INTRAVENOUS at 23:00

## 2018-06-11 RX ADMIN — POTASSIUM CHLORIDE SCH MLS/HR: 2 INJECTION, SOLUTION, CONCENTRATE INTRAVENOUS at 05:28

## 2018-06-11 NOTE — PCM.SURG1
Surgeon's Initial Post Op Note





- Surgeon's Notes


Surgeon: Dr. Villalobos 


Assistant: Mohit Velasco PGY2, Dr. Dao


Type of Anesthesia: General Endo


Pre-Operative Diagnosis: Enterocuteneous fistula


Operative Findings: enterocutaneous fistula, inflammed mesentery, SB and colon.


Post-Operative Diagnosis: Same


Operation Performed: exploratory laparotomy ileocolectomy


Specimen/Specimens Removed: ileocolic bowel


Estimated Blood Loss: EBL {In ML}: 100


Blood Products Given: N/A


Drains Used: Penrose


Post-Op Condition: Fair


Date of Surgery/Procedure: 06/11/18


Time of Surgery/Procedure: 16:02

## 2018-06-11 NOTE — PCM.SURG1
Surgeon's Initial Post Op Note





- Surgeon's Notes


Surgeon: Dr. Villalobos 


Assistant: Dr. Kimberly Velasco PGY2 


Type of Anesthesia: General Endo, Other


Anesthesia Administered By: Nathalia


Pre-Operative Diagnosis: Post op bleeding


Operative Findings: mesenteric bleeding, retroperitoneal bleeding


Post-Operative Diagnosis: Same


Operation Performed: exploratory laparotomy, hemostatis


Specimen/Specimens Removed: None


Estimated Blood Loss: EBL {In ML}: 100


Blood Products Given: N/A


Drains Used: Tim


Post-Op Condition: Fair


Date of Surgery/Procedure: 06/11/18


Time of Surgery/Procedure: 16:04

## 2018-06-11 NOTE — CP.PCM.CON
History of Present Illness





- History of Present Illness


History of Present Illness: 





 52-year-old male with h/o  laparoscopic right hemicolectomy on 5/31/2018.  He 

had persistent postoperative ileus and evidence of small bowel obstruction .Had 

exploratory laparoscopy ileocolectomy  today for   Colonic ischemia with 

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 

Adhesive small bowel obstruction


Post op he was hypotensive tachycardic taken to OR for exploratory lap and 

hemostasis


On IV fluids,Systolic BP in the lower 90's


Pathology of terminal ileum and Right Colon from 5/31/18 showed 3 tubular 

adenomas,lipoma and 2 benign lymph nodes











Review of Systems





- Constitutional


Constitutional: Fatigue





- EENT


Eyes: absent: Change in Vision


Ears: absent: Ear Discharge, Dizziness


Nose/Mouth/Throat: absent: Sore Throat, Neck Pain





- Cardiovascular


Cardiovascular: absent: Chest Pain, Leg Edema, Palpitations





- Respiratory


Respiratory: absent: Cough, Dyspnea





- Gastrointestinal


Gastrointestinal: Abdominal Pain.  absent: Nausea, Vomiting





- Genitourinary


Genitourinary: absent: Dysuria





Past Patient History





- Past Medical History & Family History


Past Medical History?: Yes





- Past Social History


Smoking Status: Never Smoked





- CARDIAC


Hx Cardiac Disorders: Yes





- PULMONARY


Hx Respiratory Disorders: Yes


Hx Sleep Apnea: Yes (POSITIVE STUDY, DOES NOT HAVE C PAP MACHINE)





- NEUROLOGICAL


Hx Neurological Disorder: No





- HEENT


Hx HEENT Problems: No





- RENAL


Hx Chronic Kidney Disease: No





- ENDOCRINE/METABOLIC


Hx Endocrine Disorders: No





- HEMATOLOGICAL/ONCOLOGICAL


Hx Blood Disorders: No





- INTEGUMENTARY


Hx Dermatological Problems: No





- MUSCULOSKELETAL/RHEUMATOLOGICAL


Hx Musculoskeletal Disorders: No


Hx Falls: No





- GASTROINTESTINAL


Hx Gastrointestinal Disorders: Yes


Hx Gastritis: Yes


Other/Comment: HX DUODENAL CARCINOID





- GENITOURINARY/GYNECOLOGICAL


Hx Genitourinary Disorders: No





- PSYCHIATRIC


Hx Psychophysiologic Disorder: No


Hx Substance Use: No





- SURGICAL HISTORY


Hx Surgeries: Yes


Hx Orthopedic Surgery: Yes (ORIF LEFT ELBOW W PLATE PLACEMENT)


Other/Comment: HX: COLON POLYPECTOMY





- ANESTHESIA


Hx Anesthesia: Yes


Hx Anesthesia Reactions: No


Hx Malignant Hyperthermia: No


Has any member of the family had a problem w/ anesthesia?: No





Meds


Allergies/Adverse Reactions: 


 Allergies











Allergy/AdvReac Type Severity Reaction Status Date / Time


 


iodine Allergy Severe ANAPHYLAXIS Verified 04/23/18 09:17














- Medications


Medications: 


 Current Medications





Acetaminophen (Tylenol 325mg Tab)  650 mg PO Q6 PRN


   PRN Reason: Fever >100.4 F


Benzocaine/Menthol (Cepacol Sore Throat)  1 emmanuel MT Q4 PRN


   PRN Reason: Sore Throat


   Last Admin: 06/06/18 13:06 Dose:  1 emmanuel


Famotidine (Pepcid)  20 mg IVP DAILY [facility]


   Last Admin: 06/11/18 10:00 Dose:  Not Given


Heparin Sodium (Porcine) (Heparin)  5,000 units SC Q8 [facility]


Hydromorphone HCl (Dilaudid)  0.5 mg IVP Q4H PRN


   PRN Reason: Pain, moderate (4-7)


   Last Admin: 06/11/18 03:51 Dose:  0.5 mg


Hydromorphone HCl (Dilaudid)  1 mg IVP Q4H PRN


   PRN Reason: Pain, severe (8-10)


   Last Admin: 06/11/18 21:07 Dose:  1 mg


Fat Emulsion Intravenous (Intralipid 20%)  500 mls @ 60 mls/hr IV QOD@1800 [facility]


   Stop: 06/15/18 02:19


   Last Admin: 06/10/18 17:11 Dose:  60 mls/hr


Piperacillin Sod/Tazobactam (Sod 3.375 gm/ Sodium Chloride)  100 mls @ 200 mls/

hr IVPB Q6H [facility]


   PRN Reason: Protocol


   Last Admin: 06/11/18 18:00 Dose:  100 mls


Sodium Chloride (Sodium Chloride 0.9%)  1,000 mls @ 75 mls/hr IV .[id number] [facility]


   Last Admin: 06/11/18 01:42 Dose:  Not Given


Lactated Ringer's (Lactated Ringer's)  1,000 mls @ 1,000 mls/hr IV .Q1H PRN


   PRN Reason: Hypotension


BUPIVACAINE 0.125%/0.9% NACL (Bupivacaine-Ns 0.125% On-Q )  600 mls @ 4 mls/

hr IJ ONCE ONE


   Stop: 06/17/18 20:59


Sodium Chloride 15 meq/Potassium Chloride 30 meq/Potassium Phosphate 15 mmole/

Calcium Gluconate 4.5 meq/Multivitamins/Vitamin C 10 ml/Amino Acids  1,043.4274 

mls @ 83 mls/hr IV .[id number] [facility]


   Stop: 06/12/18 06:34


Sodium Chloride 15 meq/Potassium Chloride 30 meq/Potassium Phosphate 15 mmole/

Calcium Gluconate 4.5 meq/Amino Acids  1,033.4274 mls @ 83 mls/hr IV .[id number] 

[facility]


   Stop: 06/12/18 17:59


Lactated Ringer's (Lactated Ringer's)  1,000 mls @ 125 mls/hr IV .Q8H [facility]


Lactated Ringer's (Lactated Ringer's)  500 mls @ 999 mls/hr IV .Q31M [facility]


Lidocaine (Lidoderm)  1 ea TD DAILY@0800 [facility]


   Last Admin: 06/11/18 09:00 Dose:  Not Given


Metoclopramide HCl (Reglan)  5 mg IVP Q6 [facility]


   Last Admin: 06/11/18 05:17 Dose:  5 mg


Ondansetron HCl (Zofran Inj)  4 mg IV Q4 PRN


   PRN Reason: Nausea/Vomiting


   Last Admin: 06/07/18 12:11 Dose:  4 mg


Pantoprazole Sodium (Protonix Inj)  40 mg IVP DAILY [facility]


   Last Admin: 06/11/18 10:00 Dose:  Not Given


Tramadol HCl (Ultram)  50 mg PO TID PRN


   PRN Reason: Pain, moderate (4-7)


   Last Admin: 06/06/18 12:51 Dose:  50 mg











Physical Exam





- Constitutional


Appears: No Acute Distress





- Head Exam


Head Exam: ATRAUMATIC, NORMAL INSPECTION, NORMOCEPHALIC





- Eye Exam


Eye Exam: EOMI, Normal appearance, PERRL





- ENT Exam


ENT Exam: Mucous Membranes Moist





- Neck Exam


Neck exam: Positive for: Normal Inspection.  Negative for: Tenderness





- Respiratory Exam


Respiratory Exam: Clear to Auscultation Bilateral





- Cardiovascular Exam


Cardiovascular Exam: Tachycardia, REGULAR RHYTHM.  absent: JVD





- GI/Abdominal Exam


GI & Abdominal Exam: Tenderness


Additional comments: 





no bowel sounds





- Extremities Exam


Extremities exam: Positive for: normal inspection, pedal pulses present.  

Negative for: calf tenderness, pedal edema





- Neurological Exam


Neurological exam: Alert, Oriented x3





- Psychiatric Exam


Psychiatric exam: Anxious





- Skin


Skin Exam: Intact, Warm





Results





- Vital Signs


Recent Vital Signs: 


 Last Vital Signs











Temp  98.9 F   06/11/18 19:30


 


Pulse  109 H  06/11/18 20:00


 


Resp  19   06/11/18 20:00


 


BP  94/63 L  06/11/18 20:00


 


Pulse Ox  100   06/11/18 20:00














- Labs


Result Diagrams: 


 06/11/18 13:52





 06/11/18 13:52


Labs: 


 Laboratory Results - last 24 hr











  06/10/18 06/10/18 06/11/18





  22:48 22:49 06:15


 


WBC    7.1


 


RBC    4.77


 


Hgb    13.4


 


Hct    40.6


 


MCV    85.1


 


MCH    28.1


 


MCHC    33.0


 


RDW    15.1 H


 


Plt Count    322


 


MPV    9.5


 


Neut % (Auto)    76.6 H


 


Lymph % (Auto)    13.4 L


 


Mono % (Auto)    6.8


 


Eos % (Auto)    2.9


 


Baso % (Auto)    0.3


 


Neut # (Auto)    5.4


 


Lymph # (Auto)    0.9 L


 


Mono # (Auto)    0.5


 


Eos # (Auto)    0.2


 


Baso # (Auto)    0.0


 


PT   


 


INR   


 


APTT   


 


Sodium   


 


Potassium   


 


Chloride   


 


Carbon Dioxide   


 


Anion Gap   


 


BUN   


 


Creatinine   


 


Est GFR ( Amer)   


 


Est GFR (Non-Af Amer)   


 


Random Glucose   


 


Calcium   


 


Phosphorus   


 


Magnesium   


 


Total Bilirubin   


 


AST   


 


ALT   


 


Alkaline Phosphatase   


 


Total Protein   


 


Albumin   


 


Globulin   


 


Albumin/Globulin Ratio   


 


Urine Color  Yellow  


 


Urine Clarity  Hazy  


 


Urine pH  5.0  


 


Ur Specific Gravity  1.016  


 


Urine Protein  Negative  


 


Urine Glucose (UA)  Normal  


 


Urine Ketones  Negative  


 


Urine Blood  1+ H  


 


Urine Nitrate  Negative  


 


Urine Bilirubin  Negative  


 


Urine Urobilinogen  Normal  


 


Ur Leukocyte Esterase  Neg  


 


Urine WBC (Auto)  3  


 


Urine RBC (Auto)  8 H  


 


Ur Squamous Epith Cells  < 1  


 


Urine Bacteria  Rare  


 


Ur Random Creatinine   89.2 


 


Ur Random Sodium   35 


 


Blood Type   


 


Antibody Screen   














  06/11/18 06/11/18 06/11/18





  06:15 06:15 09:21


 


WBC   


 


RBC   


 


Hgb   


 


Hct   


 


MCV   


 


MCH   


 


MCHC   


 


RDW   


 


Plt Count   


 


MPV   


 


Neut % (Auto)   


 


Lymph % (Auto)   


 


Mono % (Auto)   


 


Eos % (Auto)   


 


Baso % (Auto)   


 


Neut # (Auto)   


 


Lymph # (Auto)   


 


Mono # (Auto)   


 


Eos # (Auto)   


 


Baso # (Auto)   


 


PT   12.0 


 


INR   1.1 


 


APTT   30 


 


Sodium  145  


 


Potassium  3.7  


 


Chloride  106  


 


Carbon Dioxide  26  


 


Anion Gap  17  


 


BUN  33 H  


 


Creatinine  1.4  


 


Est GFR ( Amer)  > 60  


 


Est GFR (Non-Af Amer)  50  


 


Random Glucose  136 H  


 


Calcium  7.8 L  


 


Phosphorus  3.4  


 


Magnesium  2.7 H  


 


Total Bilirubin  1.3  


 


AST  37  


 


ALT  42  


 


Alkaline Phosphatase  92  


 


Total Protein  5.7 L  


 


Albumin  3.1 L  


 


Globulin  2.6  


 


Albumin/Globulin Ratio  1.2  


 


Urine Color   


 


Urine Clarity   


 


Urine pH   


 


Ur Specific Gravity   


 


Urine Protein   


 


Urine Glucose (UA)   


 


Urine Ketones   


 


Urine Blood   


 


Urine Nitrate   


 


Urine Bilirubin   


 


Urine Urobilinogen   


 


Ur Leukocyte Esterase   


 


Urine WBC (Auto)   


 


Urine RBC (Auto)   


 


Ur Squamous Epith Cells   


 


Urine Bacteria   


 


Ur Random Creatinine   


 


Ur Random Sodium   


 


Blood Type    A POSITIVE


 


Antibody Screen    Negative














  06/11/18 06/11/18 06/11/18





  13:52 13:52 13:52


 


WBC  6.7  


 


RBC  4.69  


 


Hgb  13.7  


 


Hct  40.5  


 


MCV  86.3  


 


MCH  29.1  


 


MCHC  33.7  


 


RDW  15.0 H  


 


Plt Count  372  


 


MPV  9.5  


 


Neut % (Auto)  82.1 H  


 


Lymph % (Auto)  13.2 L  


 


Mono % (Auto)  3.5  


 


Eos % (Auto)  0.8  


 


Baso % (Auto)  0.4  


 


Neut # (Auto)  5.5  


 


Lymph # (Auto)  0.9 L  


 


Mono # (Auto)  0.2  


 


Eos # (Auto)  0.1  


 


Baso # (Auto)  0.0  


 


PT   


 


INR   


 


APTT    24  D


 


Sodium   144 


 


Potassium   4.0 


 


Chloride   109 H 


 


Carbon Dioxide   26 


 


Anion Gap   14 


 


BUN   37 H 


 


Creatinine   1.5 


 


Est GFR ( Amer)   56 


 


Est GFR (Non-Af Amer)   46 


 


Random Glucose   145 H 


 


Calcium   7.7 L 


 


Phosphorus   


 


Magnesium   


 


Total Bilirubin   1.8 H 


 


AST   75 H D 


 


ALT   57 


 


Alkaline Phosphatase   88 


 


Total Protein   5.3 L 


 


Albumin   2.5 L 


 


Globulin   2.8 


 


Albumin/Globulin Ratio   0.9 L 


 


Urine Color   


 


Urine Clarity   


 


Urine pH   


 


Ur Specific Gravity   


 


Urine Protein   


 


Urine Glucose (UA)   


 


Urine Ketones   


 


Urine Blood   


 


Urine Nitrate   


 


Urine Bilirubin   


 


Urine Urobilinogen   


 


Ur Leukocyte Esterase   


 


Urine WBC (Auto)   


 


Urine RBC (Auto)   


 


Ur Squamous Epith Cells   


 


Urine Bacteria   


 


Ur Random Creatinine   


 


Ur Random Sodium   


 


Blood Type   


 


Antibody Screen   














Assessment & Plan





- Assessment and Plan (Free Text)


Assessment: 





1.s/p exploratory laparoscopy ileocolectomy  today for   Colonic ischemia with 

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 

Adhesive small bowel obstruction followed by r exploratory lap and hemostasis 

for Post op he was hypotension and  tachycardia


will monitor in ICU


IV fluids


pain meds





2. Tubular adenoma Colon





3.Post op hypotension-f/u CBC,transfuse PRN, IVF

## 2018-06-11 NOTE — PCM.OP
Operative Report





- Operative Report


Date of Surgery/Procedure: 06/11/18


Time of Surgery/Procedure: 10:00


Surgeon: Jas Villalobos


Assistant: Yarely Dao (No qualified residents avaialble); Mohit Velasco (PGY2)


Anesthesia/Sedation: General


Pre-Operative Diagnosis: Enterocutaneous fistula.  s/p laparoscopic right 

hemicolectomy.  post-operative small bowel obstruction


Post-Operative Diagnosis: Colonic ischemia with necrosis; Contained Ileocolonic 

anastomotic leak with enterocutatnous fistula.  Adhesive small bowel obstruction


Indication for Surgery: This is a 52-year-old male who underwent a laparoscopic 

right hemicolectomy on 5/31/2018.  He had persistent postoperative ileus and 

evidence of small bowel obstruction on CAT scan that was being treated with NGT 

decompression. Over the weekend he developed an enterocutaneous fistula at 

previous drain site in right abdominal wall. A CTAP showed there to be evidence 

of ischemic changes to his distal michael and significant inflammation around 

his ileocolonic anastomosis. However, there was no evidence of any intra-

abdominal fluid collection or abscess. Hes brought to the operating room for 

exploratory laparotomy, takedown of EC fistula, resection and possible revision 

of his ileocolonic anastomosis and possible ileostomy creation. The risks and 

benefits of the operation were discussed with the patient in great detail 

including but not limited to bleeding, infection  anastomotic leak, postop 

small bowel obstruction , ileus, intra-abdominal infection and possible need 

for stoma.  We discussed at length reality of possibly needing an end ileostomy 

and that the decision would be made intraoperatively depending on the 

environment of the abdominal cavity as well as the blood supply and appearance 

of the large and small intestines. I informed the patient that I have asked my 

senior colleauge, Dr. Dao, to assist me as she is a more experienced surgeon

, dealing with these complex issues. The patient understands these risks and 

agreed to proceed with above. Informed consent was signed prior to surgery.


Operative Findings: Ischemic necrosis of distal colonic staple line, contained 

perforation of colonic side of ileocolonic anstomosis and EC fistula to lateral 

abdominal wall.  Large phlegmon surrounding previous anastomosis with interloop 

small bowel adhesions and overlying omentum. Fundus of gallbladder entered 

during adhesiolysis and moblization of previous anastomosis and surrounding 

phlegmon, repaired with running 3-0 vicryl suture,; no bile leak at end of case.


Procedure/Operation Description: The patient was given a preoperative dose of  

Zosyn 20 minutes before the incision. SCD boots were placed for DVT 

prophylaxis. Guido catheter was inserted. Upper and lower body warmers placed. 

The patient had an orogastric tube in place already.  Previous staples from 

vertical midline incison were removed. A tegaderm was placed over the previous 

drain site and whre EC fistula was. The abdomen was prepped and draped in 

sterile fashion.  A timeout was performed prior to making incision.  Previous 

midline incision was opened, the fascia was identified the previous sutures 

were removed with scissors. The skin incision was extended using a 10 blade 

knife approximately 6 inches cephalad in midline, Soft tissue and fascia  was 

incised using cautery under direct vision. No significant bowel adhesions to 

abdominal wall were encountered. Omentum was taken down bluntly off Abdominal 

wall. Self retaining retractor inserted. Abdominal avity was explored. There 

was no evidenced of gross enteric spillage of adrash perforation.  We identified 

the anastomosis, which appeared as one large phlegmon in right mid abdomen and 

small bowel and greater omentum adhering to it.  I believe this is what allowed 

this process to be contained. The large bowel did appear necrotic and was 

grossly broken down at the distal colonic staple line.  Dr. Dao assisted 

throught the entirety of the procedure.  Together we took down the 

enterocutaneous fistula and mobilized the anastomsis off adhesons to the 

retroperitoenum using a combination of blunt and electrocautery dissection. 

Omental adhesons to the inferior edege of liver also taken down with 

electrocautery. While taking these down, the fundus of gallbladder was entered, 

bile was suctioned, and fundus was repaired with 3-0 vicryl without further 

leakge of bile.   Care was taken to carefully identify the duodenal and not 

cause any injury during mobilization of the anastomosis of the retroperitoneum. 

There was a small bleeding vessel which appeared to be a pancreaticoduodenal 

branch. This was suture ligated using a 3-0 silk suture, hemostasis was 

achieved.  Interloop adhesions of small bowel that were adherent to this 

phlegmonous mass were also taken down using a combination of blunt and sharp 

dissection.  This interloop adhesions seemed to explain while the patient may 

have been obstructed. At this point we decided to transect the small bowel that 

was leading into the previous anastomosis.  This was done using a WHITNEY linear 

stapler 75 mm blue load stapling device after a mesenteric rent was made 

approximately 5 cm distal to the previous anastomosis.  We then turned our 

attention to the super mesocolic region. The stomach was identified and an NG 

tube was guided into place with anesthesia's help and secured in position.  

Some of the greater omentum that was adherent to this phlegmonous mass was also 

taken down. Resection of the distal colon was them performed.  Colon was 

transected using a 75 mm blue load WHITNEY stapler this was done approximately 2 cm 

distal to the previous anastomosis where there was healthy, well perfused 

colon. The mesentery was ligated and divided the Enseal energy device, care 

taken to be close to the small bowel as well as the colon with effort to 

preserve the remaining branches of the middle colic going to the left.  

Specimen was then completely removed and sent off to pathology.  At this point 

Dr. Dao and I discussed what the best option would be weighing the risks and 

benefits of performing a primary anastomosis versus bringing out an end 

ileostomy.Given the patient's obesity and significant visceral adiposity an end 

ileostomy would be challenging and and is not without risks. As the patient did 

not have any gross intra-abdominal infection or spillage of enteric contents 

and the bowel looked healthy, we agreed that it would be safe to proceed with 

performing a side-to-side stapled functional end-to-end ileocolonic to proximal-

mid transverse colon anastomosis.  To confirm there was adequate blood flow to 

the colon we waited for a period of 25 minutes to see if there is any 

delineation of ischemia occured. During this time, the right gutter was 

irrigated with warm saline 2 liters, and we closed the mesenteric defect with 3-

0 vicryl. The bowel was again inspected and appeared pink and well-perfused so 

we proceeded with our stapled anastomosis.  The terminal ileum and transverse 

colon were approximated, enterotomies were made at the distal end of their 

staple lines respectively and a WHITNEY 75 mm blue load linear stapler device was 

used to make the anastomosis. Common enterotomy was closed using a 75mm TA blue 

load stapler. anastomotic lines were inspected and hemostatic and opening 

patent. A reinforcing suture was placed at the crotch of the anastomosis 

between the large and small intestine. Small bowel mesentery was oriented in 

correct position, The abodminal cavity was again irrigated with saline until 

clear aspirate.  External fistula site skin and soft tissue excised.  A 1" 

penrose drain was brought in through the previous drain and laid along the 

right gutter. Hemostasis was confirmed at the end of the case. The midline 

fascia was closed with 1 looped PDS x 2 in running fashion and tied in 

midpoint. Skin was left open and packed with iodform gauze. Gauze and Abd pads 

then applied and taped. A stoma appliance was placed over the penrose drain to 

allow for collection.  All instrument and needle countes were correct prior to 

abdominal closure. I was present for the entirety of the operation and Dr. Dao assisted as above throught the case.  The patient was extubated in the 

OR and brought to PACU in stable condition. During the extubation process the 

patient was ankit his abdomen quite vigorously and I was applying manual 

pressure to his incision to ensure his closure did not tear through the sutures.


Estimated Blood Loss: 200ml


Complications: None


Specimen: ileocolonic anastomosis


Discharge & Condition: Stable to PACU

## 2018-06-11 NOTE — PCM.OP
Operative Report





- Operative Report


Date of Surgery/Procedure: 06/11/18


Time of Surgery/Procedure: 13:45


Surgeon: Jas Villalobos MD;  Jeremy Harris MD (No qualified resident 

available


Assistant: Mohit Velasco DO (PGY2)


Anesthesia/Sedation: General anesthesia.  OnQ pump local


Pre-Operative Diagnosis: Post-operative hypotension s/p exploratory laparotomy, 

revision of ileocolonic anastomosis; Intra-abdminal bleed


Post-Operative Diagnosis: Retroperitoneal venous bleeding


Indication for Surgery: This is a 72-year-old male had just undergone 

exploratory laparotomy, takedown of enterocutaneous fistula, revision of an 

ileocolonic anastomosis for colonic ischemia and enterocutaneous fistula, 

earlier today.  Within 30 minutes of returning to the PACU from the above 

operation the patient's blood pressure was in the systolics of high 60 low 70.  

He responded to fluid boluses and administration of vasopressors by anesthesia. 

However during the extubation process I noted that he had vigorous contractures 

of the abdominal/bucking and his blood pressure at that time prior to leaving 

the operating room was 140-150 systolics.   With this low blood pressure 

reading I was concerned for bleeding into the abdominal cavity.  In addition 

there was blood spotting on the patients midline dressing.  As the patient was 

still under the effects of anesthesia, I contacted the patient's daughter 

Cassandra and we discussed over the phone the urgent need to return to the 

operating room to rule out any intra-abdominal bleeding. She understood and 

agreed.  This was done as an emergency case. My  Dr. Harris 

was available to help me and served as co-surgeon.


Operative Findings: No evidence of acute arterial bleeding or large venous 

bleeding. 100ml of blood found in right gutter coming from venous 

retroperitoenal bleed.  Hemostasis achieved with topical hemostatic agent. 

Additional small bleeding from above duodenum, suture ligated and hemostatic at 

end of case. Small oozing from free edge of omentum which was ligated and 

divided with Enseal energy device.  No evidence of mesenteric bleeding.


Procedure/Operation Description: Procedures performed: Exploratory laparotomy, 

abdominal washout and drain placement; placement of On-Q pump catheters.  

Patient was brought back to the operating room and transferred to the operating 

room table a supine with large left arm tucked at his side.  SCD boots were 

placed for DVT prophylaxis the patient had previously received antibiotics from 

the earlier operation.  The NG tube was placed to suction prior to endotracheal 

anesthesia following successful intubation the patient the patient's previous 

right abdominal Penrose drain was removed the incisional packing from previous 

operation was removed and the patient's abdomen was prepped and draped in 

sterile fashion.  A timeout was performed prior to making any incision.  Dr. Harris agreed to be available due to the complexity of this patient and 

served as co-surgeon.  We regain entry to the abdominal cavity through the 

previous incision the wound packing was removed and her sutures identify the 

sutures were cut with scissors and removed from the fascial edges both in both 

directions cephalad and caudad we then regained immediate access to abdominal 

cavity.  There was some minor oozing from the skin edges but no gross spillage 

of blood upon initial entry.  We first explored the lateral gutter there was 

some pooling of bright blood, abdominal pads were placed in a systematic 

fashion.  We then explored the remainder if the abdominal cavity. There was 

some minor bleeding coming from the free edge of the greater omentum, we 

ligated and divided these bleeding edges with Enseal energy device and acheived 

hemostasis there. We then turned our attention to explore the super mesocolic 

area. There was no evidence of blood in the lesser sac. We inspected the 

anastomosis and there was no evidence of bleeding at the staple line  nor was 

there any evidence of any bleeding at the root of the mesentery.  The cut edges 

of our mesentery and closure mesenteric defects were inspected there is no 

evidence of bleeding there.  There was no evidence of any arterial or pumping 

bleeding vessels in any of the areas inspected. We turned our attention again 

to the most likely source we did see was the right lateral gutter in the 

retroperitoneum that did appear to be some venous bleeding.  We placed Surgicel 

hemostatic agent over this area of this and manual pressure for 15 minutes and 

did achieve hemostasis.  There was additionally a small paraduodenal vessel 

that was a slowly bleeding vessel and was suture ligated using a 3-0 Vicryl 

suture. The pelvis was inspected and without pooling of any blood. We irrigated 

the abdomen with saline as well as antiseptic solution.  We then placed a KENDRA 

drain in the right gutter tracking up towards the gallbladder and again near 

the del-ileocoloinic anastomosis. This was brought out through a new 5mm skin 

incision in the right abdomen. 2- Nylon suture was used to seure the KENDRA drain 

at the skin.  The previous drain site in right abdomen fascia was closed from 

within the abdominal cavity by Dr. Harris using 0 vicryl suture. The skin 

wound was packed from the outside with Iodoform gauze.  Dr. Harris then 

placed the preperitoneal On-Q pump catheters for perioperative pain control. 

This was done using a small stab incision in the subxiphoid skin. Introducer 

sheaths tracked pre-peritoneal space under direct vision bilateral subcostal 

regions, being sure not to enter the abdominal cavity.  Catheters  inserted 

laterally on both sides and 0.5 percent Marcaine solution 4 mL's was given in 

each. Catherters were secured with derabond at the insertion site and taped 

with tegaderm and steristrips.  The midline fascia was then closed again using 

1 looped PDS 2 starting on opposite ends and was tied in the middle in 

addition Dr. Townsend had placed interrupted 1 Prolene sutures as an 

interrupted fashion to help buttress the fascial closure and to help prevent 

the risk of dehiscence. The umbilical skin did not appear healthy and was 

excised with 15 blde. 3 staples were placed to approximate the wound but skin 

was left open and packed with iodform gauze. Gauze and Abd pads then applied 

and taped.  All instrument and needle counts were correct prior to abdominal 

closure. I was present for the entirety of the operation.  The patient 

underwent successful deep extubation by anesthesia without any violent 

abdominal contractions and brought to PACU in stable condition


Estimated Blood Loss: 150ml


Drains: 19Fr KENDRA drain right abd


Complications: None


Discharge & Condition: Stable to PACU. Possible ICU admission.

## 2018-06-11 NOTE — CP.PCM.PN
Subjective





- Date & Time of Evaluation


Date of Evaluation: 06/11/18


Time of Evaluation: 12:35





- Subjective


Subjective: 





Came to see the Pt earlier.


 Unable to see Pt because he was in OR.


Labs were reviewed.





Objective





- Vital Signs/Intake and Output


Vital Signs (last 24 hours): 


 











Temp Pulse Resp BP Pulse Ox


 


 100.6 F H  97 H  20   102/60   100 


 


 06/11/18 12:59  06/11/18 13:30  06/11/18 13:30  06/11/18 13:30  06/11/18 13:30








Intake and Output: 


 











 06/11/18 06/11/18





 06:59 18:59


 


Intake Total 3008 3650


 


Output Total 1700 100


 


Balance 1308 3550














- Medications


Medications: 


 Current Medications





Acetaminophen (Tylenol 325mg Tab)  650 mg PO Q6 PRN


   PRN Reason: Fever >100.4 F


Benzocaine/Menthol (Cepacol Sore Throat)  1 emmanuel MT Q4 PRN


   PRN Reason: Sore Throat


   Last Admin: 06/06/18 13:06 Dose:  1 emmanuel


Famotidine (Pepcid)  20 mg IVP DAILY [facility]


   Last Admin: 06/11/18 10:00 Dose:  Not Given


Heparin Sodium (Porcine) (Heparin)  5,000 units SC Q12 [facility]


Hydromorphone HCl (Dilaudid)  0.5 mg IVP Q4H PRN


   PRN Reason: Pain, moderate (4-7)


   Last Admin: 06/11/18 03:51 Dose:  0.5 mg


Hydromorphone HCl (Dilaudid)  0.5 mg IVP Q10M PRN


   PRN Reason: Pain, moderate (4-7)


   Stop: 06/11/18 18:01


   Last Admin: 06/11/18 16:15 Dose:  0.5 mg


Hydromorphone HCl (Dilaudid)  1 mg IVP Q4H PRN


   PRN Reason: Pain, severe (8-10)


Fat Emulsion Intravenous (Intralipid 20%)  500 mls @ 60 mls/hr IV QOD@1800 [facility]


   Stop: 06/15/18 02:19


   Last Admin: 06/10/18 17:11 Dose:  60 mls/hr


Piperacillin Sod/Tazobactam (Sod 3.375 gm/ Sodium Chloride)  100 mls @ 200 mls/

hr IVPB Q6H [facility]


   PRN Reason: Protocol


   Last Admin: 06/11/18 10:15 Dose:  100 mls


Sodium Chloride (Sodium Chloride 0.9%)  1,000 mls @ 75 mls/hr IV .[id number] [facility]


   Last Admin: 06/11/18 01:42 Dose:  Not Given


Lactated Ringer's (Lactated Ringer's)  1,000 mls @ 1,000 mls/hr IV .Q1H PRN


   PRN Reason: Hypotension


Acetaminophen 1,000 mg/ (Miscellaneous)  100 mls @ 400 mls/hr IV Q6 PRN


   PRN Reason: Pain, moderate (4-7)


   Stop: 06/12/18 14:01


BUPIVACAINE 0.125%/0.9% NACL (Bupivacaine-Ns 0.125% On-Q )  600 mls @ 4 mls/

hr IJ ONCE ONE


   Stop: 06/17/18 20:59


Sodium Chloride 15 meq/Potassium Chloride 30 meq/Potassium Phosphate 15 mmole/

Calcium Gluconate 4.5 meq/Multivitamins/Vitamin C 10 ml/Amino Acids  1,043.4274 

mls @ 83 mls/hr IV .[id number] [facility]


   Stop: 06/12/18 06:34


Sodium Chloride 15 meq/Potassium Chloride 30 meq/Potassium Phosphate 15 mmole/

Calcium Gluconate 4.5 meq/Amino Acids  1,033.4274 mls @ 83 mls/hr IV .[id number] 

[facility]


   Stop: 06/12/18 17:59


Lactated Ringer's (Lactated Ringer's)  1,000 mls @ 125 mls/hr IV .Q8H [facility]


Lactated Ringer's (Lactated Ringer's)  500 mls @ 999 mls/hr IV .Q31M [facility]


Lidocaine (Lidoderm)  1 ea TD DAILY@0800 [facility]


   Last Admin: 06/11/18 09:00 Dose:  Not Given


Metoclopramide HCl (Reglan)  5 mg IVP Q6 [facility]


   Last Admin: 06/11/18 05:17 Dose:  5 mg


Ondansetron HCl (Zofran Inj)  4 mg IV Q4 PRN


   PRN Reason: Nausea/Vomiting


   Last Admin: 06/07/18 12:11 Dose:  4 mg


Pantoprazole Sodium (Protonix Inj)  40 mg IVP DAILY [facility]


   Last Admin: 06/11/18 10:00 Dose:  Not Given


Tramadol HCl (Ultram)  50 mg PO TID PRN


   PRN Reason: Pain, moderate (4-7)


   Last Admin: 06/06/18 12:51 Dose:  50 mg











- Labs


Labs: 


 





 06/11/18 13:52 





 06/11/18 13:52 





 











PT  12.0 SECONDS (9.7-12.2)   06/11/18  06:15    


 


INR  1.1   06/11/18  06:15    


 


APTT  24 SECONDS (21-34)  D 06/11/18  13:52    














Assessment and Plan





- Assessment and Plan (Free Text)


Assessment: 





YORDAN secodary to prerenal azotemia


Renal US reviewed Echogenecity is normal 


Serum creat. is improving


Hypernatremia corrected


S/P Rt hemicollectomy for tubular adenoma


Plan: 





Continue with current Mx 


Labs in am

## 2018-06-11 NOTE — CP.PCM.PN
<Mohit Velasco - Last Filed: 06/11/18 07:21>





Subjective





- Date & Time of Evaluation


Date of Evaluation: 06/11/18


Time of Evaluation: 07:21





- Subjective


Subjective: 





Surgery 





Pt seen and examined. Received enema overnight. Had regular BM. Denies fever, 

nausea, vomiting. Pain controlled. 





Objective





- Vital Signs/Intake and Output


Vital Signs (last 24 hours): 


 











Temp Pulse Resp BP Pulse Ox


 


 98 F   85   20   118/75   94 L


 


 06/10/18 23:35  06/11/18 04:00  06/10/18 23:35  06/10/18 23:35  06/10/18 23:35








Intake and Output: 


 











 06/11/18 06/11/18





 06:59 18:59


 


Intake Total 3008 


 


Output Total 1700 


 


Balance 1308 














- Medications


Medications: 


 Current Medications





Acetaminophen (Tylenol 325mg Tab)  650 mg PO Q6 PRN


   PRN Reason: Fever >100.4 F


Benzocaine/Menthol (Cepacol Sore Throat)  1 emmanuel MT Q4 PRN


   PRN Reason: Sore Throat


   Last Admin: 06/06/18 13:06 Dose:  1 emmanuel


Famotidine (Pepcid)  20 mg IVP DAILY [facility]


   Last Admin: 06/10/18 11:38 Dose:  20 mg


Hydromorphone HCl (Dilaudid)  0.5 mg IVP Q4H PRN


   PRN Reason: Pain, moderate (4-7)


   Last Admin: 06/11/18 03:51 Dose:  0.5 mg


Fat Emulsion Intravenous (Intralipid 20%)  500 mls @ 60 mls/hr IV QOD@1800 [facility]


   Stop: 06/15/18 02:19


   Last Admin: 06/10/18 17:11 Dose:  60 mls/hr


Piperacillin Sod/Tazobactam (Sod 3.375 gm/ Sodium Chloride)  100 mls @ 200 mls/

hr IVPB Q6H [facility]


   PRN Reason: Protocol


   Last Admin: 06/11/18 03:51 Dose:  200 mls/hr


Sodium Chloride (Sodium Chloride 0.9%)  1,000 mls @ 75 mls/hr IV .[id number] [facility]


   Last Admin: 06/11/18 01:42 Dose:  Not Given


Lidocaine (Lidoderm)  1 ea TD DAILY@0800 [facility]


   Last Admin: 06/10/18 08:58 Dose:  1 ea


Metoclopramide HCl (Reglan)  5 mg IVP Q6 [facility]


   Last Admin: 06/11/18 05:17 Dose:  5 mg


Ondansetron HCl (Zofran Inj)  4 mg IV Q4 PRN


   PRN Reason: Nausea/Vomiting


   Last Admin: 06/07/18 12:11 Dose:  4 mg


Pantoprazole Sodium (Protonix Inj)  40 mg IVP DAILY [facility]


   Last Admin: 06/10/18 10:06 Dose:  40 mg


Tramadol HCl (Ultram)  50 mg PO TID PRN


   PRN Reason: Pain, moderate (4-7)


   Last Admin: 06/06/18 12:51 Dose:  50 mg











- Labs


Labs: 


 





 06/11/18 06:15 





 06/11/18 06:15 





 











PT  12.0 SECONDS (9.7-12.2)   06/11/18  06:15    


 


INR  1.1   06/11/18  06:15    


 


APTT  30 SECONDS (21-34)   06/11/18  06:15    














- Constitutional


Appears: No Acute Distress





- Head Exam


Head Exam: ATRAUMATIC, NORMAL INSPECTION, NORMOCEPHALIC





- Eye Exam


Eye Exam: EOMI, Normal appearance, PERRL


Pupil Exam: NORMAL ACCOMODATION, PERRL





- ENT Exam


ENT Exam: Mucous Membranes Moist, Normal Exam





- Neck Exam


Neck Exam: Full ROM, Normal Inspection.  absent: Lymphadenopathy





- Respiratory Exam


Respiratory Exam: Clear to Ausculation Bilateral, NORMAL BREATHING PATTERN





- Cardiovascular Exam


Cardiovascular Exam: REGULAR RHYTHM, +S1, +S2.  absent: Murmur





- GI/Abdominal Exam


GI & Abdominal Exam: Soft, Normal Bowel Sounds.  absent: Distended, Firm, 

Guarding, Rigid, Tenderness


Additional comments: 


wound bag : 20cc purulent output. Incision C/D/I





- Rectal Exam


Rectal Exam: NORMAL INSPECTION





- Extremities Exam


Extremities Exam: Full ROM, Normal Capillary Refill, Normal Inspection.  absent

: Joint Swelling, Pedal Edema





- Back Exam


Back Exam: NORMAL INSPECTION





- Neurological Exam


Neurological Exam: Alert, Awake, CN II-XII Intact, Normal Gait, Oriented x3





- Psychiatric Exam


Psychiatric exam: Normal Affect, Normal Mood





- Skin


Skin Exam: Warm





Assessment and Plan





- Assessment and Plan (Free Text)


Assessment: 





POD 11 s/p Lap R hemcolectomy 


CT: focal pneumatosis coli 





-NPO 


-IVF


-PPN 


-ABX


-Pain control


-ambulate /IS


-DVT/GI ppx





Will DW Dr. Villalobos 





<Jas Villalobos - Last Filed: 06/11/18 08:13>





Objective





- Vital Signs/Intake and Output


Vital Signs (last 24 hours): 


 











Temp Pulse Resp BP Pulse Ox


 


 98 F   85   20   118/75   94 L


 


 06/10/18 23:35  06/11/18 04:00  06/10/18 23:35  06/10/18 23:35  06/10/18 23:35








Intake and Output: 


 











 06/11/18 06/11/18





 06:59 18:59


 


Intake Total 3008 


 


Output Total 1700 


 


Balance 1308 














- Medications


Medications: 


 Current Medications





Acetaminophen (Tylenol 325mg Tab)  650 mg PO Q6 PRN


   PRN Reason: Fever >100.4 F


Benzocaine/Menthol (Cepacol Sore Throat)  1 emmanuel MT Q4 PRN


   PRN Reason: Sore Throat


   Last Admin: 06/06/18 13:06 Dose:  1 emmanuel


Famotidine (Pepcid)  20 mg IVP DAILY [facility]


   Last Admin: 06/10/18 11:38 Dose:  20 mg


Hydromorphone HCl (Dilaudid)  0.5 mg IVP Q4H PRN


   PRN Reason: Pain, moderate (4-7)


   Last Admin: 06/11/18 03:51 Dose:  0.5 mg


Fat Emulsion Intravenous (Intralipid 20%)  500 mls @ 60 mls/hr IV QOD@1800 [facility]


   Stop: 06/15/18 02:19


   Last Admin: 06/10/18 17:11 Dose:  60 mls/hr


Piperacillin Sod/Tazobactam (Sod 3.375 gm/ Sodium Chloride)  100 mls @ 200 mls/

hr IVPB Q6H [facility]


   PRN Reason: Protocol


   Last Admin: 06/11/18 03:51 Dose:  200 mls/hr


Sodium Chloride (Sodium Chloride 0.9%)  1,000 mls @ 75 mls/hr IV .[id number] [facility]


   Last Admin: 06/11/18 01:42 Dose:  Not Given


Lidocaine (Lidoderm)  1 ea TD DAILY@0800 [facility]


   Last Admin: 06/10/18 08:58 Dose:  1 ea


Metoclopramide HCl (Reglan)  5 mg IVP Q6 [facility]


   Last Admin: 06/11/18 05:17 Dose:  5 mg


Ondansetron HCl (Zofran Inj)  4 mg IV Q4 PRN


   PRN Reason: Nausea/Vomiting


   Last Admin: 06/07/18 12:11 Dose:  4 mg


Pantoprazole Sodium (Protonix Inj)  40 mg IVP DAILY [facility]


   Last Admin: 06/10/18 10:06 Dose:  40 mg


Tramadol HCl (Ultram)  50 mg PO TID PRN


   PRN Reason: Pain, moderate (4-7)


   Last Admin: 06/06/18 12:51 Dose:  50 mg











- Labs


Labs: 


 





 06/11/18 06:15 





 06/11/18 06:15 





 











PT  12.0 SECONDS (9.7-12.2)   06/11/18  06:15    


 


INR  1.1   06/11/18  06:15    


 


APTT  30 SECONDS (21-34)   06/11/18  06:15    














Assessment and Plan





- Assessment and Plan (Free Text)


Plan: 


Patient seen and examined at bedside with resident staff. Romansh 

interpretation provided by nurse Alea Andrews. 


72 M 11 days s/p lap right hemicolectomy with persistent post-op ileus vs. sbo, 

and new enterocutaneous fistula. Likely has leak from colonic staple line and 

inflammation causing obstruction at anastomosis based on CTAP which I 

personally reviewed. Colonic pneumotosis at distal end at staple line, likely 

ischemic. No intra-abdominal abscess or fluid collection. No evidence of soft 

tissue infection. Patient not ill appearing and without peritonitis. I 

discussed with the patient at length the need for exploratory laparotomy, 

takedown of EC fistula, bowel resection and anastomotic revision, and possible 

ostomy, possible wound vac placement. We discussed the risks and benefits of 

the above procedure including, but not limited to, bleeding, infection, 

anastomotic leak, and post-operative bowel obstruction. The decision of whether 

or not an ileostomy is needed will be determined intra-operatively and I 

informed him that the chances of needing an ostomy would be 50/50 based on 

ability to safely perform anastomosis or not. He understands the above and 

agreed to proceeding. Informed consent obtained. 


I have asked my , Dr. Dao to be present during the case to 

assist as this is a complex patient.

## 2018-06-12 LAB
ALBUMIN SERPL-MCNC: 2.1 G/DL (ref 3.5–5)
ALBUMIN/GLOB SERPL: 0.9 {RATIO} (ref 1–2.1)
ALT SERPL-CCNC: 52 U/L (ref 21–72)
AST SERPL-CCNC: 37 U/L (ref 17–59)
BASOPHILS # BLD AUTO: 0 K/UL (ref 0–0.2)
BASOPHILS NFR BLD: 0.3 % (ref 0–2)
BUN SERPL-MCNC: 32 MG/DL (ref 9–20)
CALCIUM SERPL-MCNC: 7.1 MG/DL (ref 8.6–10.4)
EOSINOPHIL # BLD AUTO: 0 K/UL (ref 0–0.7)
EOSINOPHIL NFR BLD: 0 % (ref 0–4)
ERYTHROCYTE [DISTWIDTH] IN BLOOD BY AUTOMATED COUNT: 15 % (ref 11.5–14.5)
GFR NON-AFRICAN AMERICAN: 50
HGB BLD-MCNC: 13.2 G/DL (ref 12–18)
LYMPHOCYTE: 5 % (ref 20–40)
LYMPHOCYTES # BLD AUTO: 0.8 K/UL (ref 1–4.3)
LYMPHOCYTES NFR BLD AUTO: 7.3 % (ref 20–40)
MCH RBC QN AUTO: 28.3 PG (ref 27–31)
MCHC RBC AUTO-ENTMCNC: 33 G/DL (ref 33–37)
MCV RBC AUTO: 85.9 FL (ref 80–94)
MONOCYTE: 2 % (ref 0–10)
MONOCYTES # BLD: 0.5 K/UL (ref 0–0.8)
MONOCYTES NFR BLD: 4.3 % (ref 0–10)
NEUTROPHILS # BLD: 10.1 K/UL (ref 1.8–7)
NEUTROPHILS NFR BLD AUTO: 78 % (ref 50–75)
NEUTROPHILS NFR BLD AUTO: 88.1 % (ref 50–75)
NEUTS BAND NFR BLD: 15 % (ref 0–2)
NRBC BLD AUTO-RTO: 0 % (ref 0–2)
PLATELET # BLD EST: NORMAL 10*3/UL
PLATELET # BLD: 371 K/UL (ref 130–400)
PMV BLD AUTO: 9.9 FL (ref 7.2–11.7)
RBC # BLD AUTO: 4.65 MIL/UL (ref 4.4–5.9)
TOTAL CELLS COUNTED BLD: 100
WBC # BLD AUTO: 11.5 K/UL (ref 4.8–10.8)

## 2018-06-12 RX ADMIN — SODIUM CHLORIDE SCH MLS/HR: 9 INJECTION, SOLUTION INTRAVENOUS at 18:04

## 2018-06-12 RX ADMIN — WATER SCH MLS/HR: 1 INJECTION INTRAMUSCULAR; INTRAVENOUS; SUBCUTANEOUS at 15:30

## 2018-06-12 RX ADMIN — HYDROMORPHONE HYDROCHLORIDE PRN MG: 1 INJECTION, SOLUTION INTRAMUSCULAR; INTRAVENOUS; SUBCUTANEOUS at 01:30

## 2018-06-12 RX ADMIN — WATER SCH MLS/HR: 1 INJECTION INTRAMUSCULAR; INTRAVENOUS; SUBCUTANEOUS at 00:10

## 2018-06-12 RX ADMIN — SODIUM CHLORIDE SCH MLS/HR: 9 INJECTION, SOLUTION INTRAVENOUS at 11:00

## 2018-06-12 RX ADMIN — WATER SCH MLS/HR: 1 INJECTION INTRAMUSCULAR; INTRAVENOUS; SUBCUTANEOUS at 06:30

## 2018-06-12 RX ADMIN — HYDROMORPHONE HYDROCHLORIDE PRN MG: 1 INJECTION, SOLUTION INTRAMUSCULAR; INTRAVENOUS; SUBCUTANEOUS at 22:50

## 2018-06-12 RX ADMIN — SODIUM CHLORIDE SCH MLS/HR: 9 INJECTION, SOLUTION INTRAVENOUS at 04:25

## 2018-06-12 RX ADMIN — I.V. FAT EMULSION SCH MLS/HR: 20 EMULSION INTRAVENOUS at 18:02

## 2018-06-12 RX ADMIN — HYDROMORPHONE HYDROCHLORIDE PRN MG: 1 INJECTION, SOLUTION INTRAMUSCULAR; INTRAVENOUS; SUBCUTANEOUS at 06:20

## 2018-06-12 RX ADMIN — SODIUM CHLORIDE SCH MLS/HR: 9 INJECTION, SOLUTION INTRAVENOUS at 22:00

## 2018-06-12 RX ADMIN — WATER SCH MLS/HR: 1 INJECTION INTRAMUSCULAR; INTRAVENOUS; SUBCUTANEOUS at 23:00

## 2018-06-12 NOTE — CP.PCM.PN
<Tiburcio Dietrich D - Last Filed: 06/12/18 13:39>





Subjective





- Date & Time of Evaluation


Date of Evaluation: 06/12/18


Time of Evaluation: 13:39





- Subjective


Subjective: 





SURGERY PROGRESS NOTE FOR DR. VILLALOBOS





72M seen and examined at bedside. States he is having some pain which the 

medications help with sometimes. Denies nausea, vomiting, fevers or chills. 

Patient is current out of bed to chair.





Objective





- Vital Signs/Intake and Output


Vital Signs (last 24 hours): 


 











Temp Pulse Resp BP Pulse Ox


 


 97.9 F   104 H  20   113/72   100 


 


 06/12/18 07:55  06/12/18 07:55  06/12/18 07:55  06/12/18 07:55  06/12/18 07:55








Intake and Output: 


 











 06/12/18 06/12/18





 06:59 18:59


 


Intake Total 1772 266


 


Output Total 1540 265


 


Balance 232 1














- Medications


Medications: 


 Current Medications





Albuterol/Ipratropium (Duoneb 3 Mg/0.5 Mg (3 Ml) Ud)  3 ml INH RQ4 PRN


   PRN Reason: Shortness of Breath


Benzocaine/Menthol (Cepacol Sore Throat)  1 emmanuel MT Q4 PRN


   PRN Reason: Sore Throat


   Last Admin: 06/06/18 13:06 Dose:  1 emmanuel


Heparin Sodium (Porcine) (Heparin)  5,000 units SC Q8 [facility]


   Last Admin: 06/12/18 06:05 Dose:  5,000 units


Hydromorphone HCl (Dilaudid)  0.5 mg IVP Q2 PRN


   PRN Reason: Pain, severe (8-10)


Piperacillin Sod/Tazobactam (Sod 3.375 gm/ Sodium Chloride)  100 mls @ 200 mls/

hr IVPB Q6H [facility]


   PRN Reason: Protocol


   Last Admin: 06/12/18 11:00 Dose:  200 mls/hr


BUPIVACAINE 0.125%/0.9% NACL (Bupivacaine-Ns 0.125% On-Q )  600 mls @ 4 mls/

hr IJ ONCE ONE


   Stop: 06/17/18 20:59


Sodium Chloride 15 meq/Potassium Chloride 30 meq/Potassium Phosphate 15 mmole/

Calcium Gluconate 4.5 meq/Amino Acids  1,033.4274 mls @ 83 mls/hr IV .[id number] 

[facility]


   Stop: 06/12/18 17:59


   Last Admin: 06/12/18 06:05 Dose:  83 mls/hr


Metronidazole (Flagyl)  500 mg in 100 mls @ 100 mls/hr IVPB Q8H [facility]


   PRN Reason: Protocol


   Last Admin: 06/12/18 06:30 Dose:  100 mls/hr


Acetaminophen 1,000 mg/ (Miscellaneous)  100 mls @ 400 mls/hr IV Q6 PRN


   PRN Reason: Pain, Mild (1-3)


   Stop: 06/13/18 10:34


Sodium Chloride 15 meq/Magnesium Sulfate 3 meq/Calcium Gluconate 4.5 meq/

Multivitamins/Vitamin C 10 ml/Amino Acids  1,024.1663 mls @ 63 mls/hr IV 

.[id number] [facility]


   Stop: 06/13/18 10:15


Sodium Chloride 15 meq/Magnesium Sulfate 3 meq/Calcium Gluconate 4.5 meq/Amino 

Acids  1,014.1663 mls @ 63 mls/hr IV .Q16H6M [facility]


   Stop: 06/13/18 17:59


Fat Emulsion Intravenous (Intralipid 20%)  500 mls @ 60 mls/hr IV QOD@1800 [facility]


   Stop: 06/18/18 18:01


Lactated Ringer's (Lactated Ringer's)  1,000 mls @ 75 mls/hr IV .[id number] [facility]


Lidocaine (Lidoderm)  1 ea TD DAILY@0800 [facility]


   Last Admin: 06/11/18 09:00 Dose:  Not Given


Metoclopramide HCl (Reglan)  5 mg IVP Q6 [facility]


   Last Admin: 06/12/18 11:43 Dose:  5 mg


Ondansetron HCl (Zofran Inj)  4 mg IV Q4 PRN


   PRN Reason: Nausea/Vomiting


   Last Admin: 06/12/18 09:24 Dose:  4 mg


Pantoprazole Sodium (Protonix Inj)  40 mg IVP DAILY [facility]


   Last Admin: 06/12/18 09:23 Dose:  40 mg











- Labs


Labs: 


 





 06/12/18 06:28 





 06/12/18 06:27 





 











PT  12.0 SECONDS (9.7-12.2)   06/11/18  06:15    


 


INR  1.1   06/11/18  06:15    


 


APTT  24 SECONDS (21-34)  D 06/11/18  13:52    














- Constitutional


Appears: Non-toxic, No Acute Distress





- Respiratory Exam


Respiratory Exam: Clear to Ausculation Bilateral, NORMAL BREATHING PATTERN





- Cardiovascular Exam


Cardiovascular Exam: REGULAR RHYTHM, +S1, +S2





- GI/Abdominal Exam


GI & Abdominal Exam: Soft, Tenderness.  absent: Distended, Firm, Guarding, Rigid


Additional comments: 





Dresisng CDI


Drain 50cc overnight, serosang


NGT 100cc overnight 





- Extremities Exam


Extremities Exam: absent: Pedal Edema, Tenderness





- Neurological Exam


Neurological Exam: Alert, Awake





- Skin


Skin Exam: Dry, Intact, Normal Color, Warm





Assessment and Plan





- Assessment and Plan (Free Text)


Assessment: 





72M presents s/p ex-lap with ileo-colic resection at site of previous 

anastomosis POD#1


Plan: 





NPO, decrease IVF


Pain control- increase pain med rate/ increase on-Q ball. 


Continue antibiotics


Monitor urine output


DC Newby today


Continue to encourage out of bed and ambulation


Discussed with Dr. Wilfredo Dietrich, PGY2





<Jas Villalobos - Last Filed: 06/12/18 17:13>





Objective





- Vital Signs/Intake and Output


Vital Signs (last 24 hours): 


 











Temp Pulse Resp BP Pulse Ox


 


 97.9 F   116 H  16   117/75   96 


 


 06/12/18 12:00  06/12/18 13:00  06/12/18 14:00  06/12/18 15:00  06/12/18 15:00








Intake and Output: 


 











 06/12/18 06/12/18





 06:59 18:59


 


Intake Total 1772 1372


 


Output Total 1540 995


 


Balance 232 377














- Medications


Medications: 


 Current Medications





Albuterol/Ipratropium (Duoneb 3 Mg/0.5 Mg (3 Ml) Ud)  3 ml INH RQ4 PRN


   PRN Reason: Shortness of Breath


Benzocaine/Menthol (Cepacol Sore Throat)  1 emmanuel MT Q4 PRN


   PRN Reason: Sore Throat


   Last Admin: 06/06/18 13:06 Dose:  1 emmanuel


Heparin Sodium (Porcine) (Heparin)  5,000 units SC Q8 [facility]


   Last Admin: 06/12/18 14:31 Dose:  5,000 units


Hydromorphone HCl (Dilaudid)  0.5 mg IVP Q2 PRN


   PRN Reason: Pain, severe (8-10)


Piperacillin Sod/Tazobactam (Sod 3.375 gm/ Sodium Chloride)  100 mls @ 200 mls/

hr IVPB Q6H [facility]


   PRN Reason: Protocol


   Last Admin: 06/12/18 11:00 Dose:  200 mls/hr


BUPIVACAINE 0.125%/0.9% NACL (Bupivacaine-Ns 0.125% On-Q )  600 mls @ 4 mls/

hr IJ ONCE ONE


   Stop: 06/17/18 20:59


Sodium Chloride 15 meq/Potassium Chloride 30 meq/Potassium Phosphate 15 mmole/

Calcium Gluconate 4.5 meq/Amino Acids  1,033.4274 mls @ 83 mls/hr IV .[id number] 

[facility]


   Stop: 06/12/18 17:59


   Last Admin: 06/12/18 06:05 Dose:  83 mls/hr


Metronidazole (Flagyl)  500 mg in 100 mls @ 100 mls/hr IVPB Q8H [facility]


   PRN Reason: Protocol


   Last Admin: 06/12/18 15:30 Dose:  100 mls/hr


Acetaminophen 1,000 mg/ (Miscellaneous)  100 mls @ 400 mls/hr IV Q6 PRN


   PRN Reason: Pain, Mild (1-3)


   Stop: 06/13/18 10:34


Sodium Chloride 15 meq/Magnesium Sulfate 3 meq/Calcium Gluconate 4.5 meq/

Multivitamins/Vitamin C 10 ml/Amino Acids  1,024.1663 mls @ 63 mls/hr IV 

.[id number] [facility]


   Stop: 06/13/18 10:15


Sodium Chloride 15 meq/Magnesium Sulfate 3 meq/Calcium Gluconate 4.5 meq/Amino 

Acids  1,014.1663 mls @ 63 mls/hr IV .Q16H6M [facility]


   Stop: 06/13/18 17:59


Fat Emulsion Intravenous (Intralipid 20%)  500 mls @ 60 mls/hr IV QOD@1800 [facility]


   Stop: 06/18/18 18:01


Lactated Ringer's (Lactated Ringer's)  1,000 mls @ 75 mls/hr IV .[id number] [facility]


   Last Admin: 06/12/18 11:30 Dose:  75 mls/hr


Lidocaine (Lidoderm)  1 ea TD DAILY@0800 [facility]


   Last Admin: 06/11/18 09:00 Dose:  Not Given


Metoclopramide HCl (Reglan)  5 mg IVP Q6 [facility]


   Last Admin: 06/12/18 11:43 Dose:  5 mg


Ondansetron HCl (Zofran Inj)  4 mg IV Q4 PRN


   PRN Reason: Nausea/Vomiting


   Last Admin: 06/12/18 09:24 Dose:  4 mg


Pantoprazole Sodium (Protonix Inj)  40 mg IVP DAILY [facility]


   Last Admin: 06/12/18 09:23 Dose:  40 mg











- Labs


Labs: 


 





 06/12/18 06:28 





 06/12/18 06:27 





 











PT  12.0 SECONDS (9.7-12.2)   06/11/18  06:15    


 


INR  1.1   06/11/18  06:15    


 


APTT  24 SECONDS (21-34)  D 06/11/18  13:52    














Assessment and Plan





- Assessment and Plan (Free Text)


Plan: 


Patient seen and examined at bedside with resident staff. Patient's family at 

bedside and daughter interpreting Lao to english. In chair at present. Pain 

moderately controlled. Some discomfort from having just moved into chair. 

Slight tachycardia and SBP 90's overnight, improved this am following fluid 

boluses and increase rate overnight. Adequate urine output. drain is 

serosanguinous and 50cc since OR. abd soft but full. Dressings c/d/i. 


Patient is now POD13 from laporscopic right hemicolectomy, POD1 from 

exploratory laparotomy, takedown of ECF and revision of ileocolonic anastomosis 

for colon ischemia and necrotic staple line, also POD1 from emergent takeback 

and re-exploration of retroperitoenal venous bleeding. I expect he will have 

some degree of SIRS type of inflammatory response for first 24-48 hours post-

op. Supportive care. 


- OnQ pain infusion increased to 7mL/hr. Can increase PRN dilaudid to q2 for 

breakthrough if blood pressure unaffected. 


- Maintain SBP >100 to ensure proper perfusion to anastomosis. Avoid 

hypotension. Can use phenylephrine if needed.  Monitor tachycardia, likely 

related inflammatory state


- STRICT NPO. NGT to Low INTERMITTENT wall suction at all times. NGT to remain 

in place until flatus. Abdominal wounds - dressing to be taken down in AM. 

Continue wet-to-dry dressing. BID. KENDRA drain to bulb suction. Monitor for 

bleeding, bile, detect anastomotic leak. Will plan to remove after patient 

taking PO at later date. 


- continue Zosyn and flagyl until WBC and bandemia resolved. Can add Vancomycin 

if needed for broad coverage of abdominal infection


- Pulmonary toilet and supplemental 02 to maintain sat>92. History of COPD and ?

ISMAEL. Also required diuresis earlier this admission following initial operation. 

Hx of PE 5 years ago, LLE DVT found to be the source, finished 6 months 

coumadin at that time. This admission he had repeat LE Duplex which was 

negative for DVT. HSQ 5000U q8h for prophylaxis. SCD to be worn at all times 

while in bed or in chair. Can remove for ambulation and PT


- Strict I/O's. LR decreased to 75ml.hr. PPN at 83ml/hr. Can switch to TPN with 

PICC placement tomorrow. Avoid fluid overload. Titrate total IVF rate for urine 

output goal of 0.5cc/kg/hr. Had YORDAN prior to last surgery, Nephrology 

following. Ok to discontinue newby 


- PPI BID. Patient has history of PUD and duodenal ulcers secondary to 

carcinoid that was endoscopically resected in past. 


- Glycemic control. Insulin as needed. BG goal :130-160


- OOB to chair. PT following, will assess if rehab or home PT may be needed.

## 2018-06-12 NOTE — RAD
PROCEDURE:  CHEST RADIOGRAPH, 1 VIEW



HISTORY:

post op



COMPARISON:

Comparison made with chest radiograph 06/05/2018.  Comparison also 

made with the CT scan abdomen pelvis 06/10/2018 which imaged both 

lung bases.



FINDINGS:

In situ NGT, the tip of which has not been included on this exam 

though does lie well below EG junction. 



LUNGS:

Mild bibasilar atelectasis.



PLEURA:

No pneumothorax or pleural fluid seen.



CARDIOVASCULAR:

Cardiomegaly. 



OSSEOUS STRUCTURES:

Mild multilevel degenerative spondylosis of the thoracic spine



VISUALIZED UPPER ABDOMEN:

Normal.



OTHER FINDINGS:

None. 



IMPRESSION:

In situ NGT, the tip of which has not been included on this exam 

though does lie well below EG junction. 



Mild bibasilar atelectasis.

## 2018-06-12 NOTE — CP.PCM.CON
<Rajani Iglesias - Last Filed: 06/12/18 14:02>





History of Present Illness





- History of Present Illness


History of Present Illness: 





Patient was seen and examined at bedside. Patient reports his pain is well 

controlled. Denied any passing flatus or BM. Patient is OOB into chair. Guido 

in place. 





Past Patient History





- Past Medical History & Family History


Past Medical History?: Yes





- Past Social History


Smoking Status: Never Smoked





- CARDIAC


Hx Cardiac Disorders: Yes





- PULMONARY


Hx Respiratory Disorders: Yes


Hx Sleep Apnea: Yes (POSITIVE STUDY, DOES NOT HAVE C PAP MACHINE)





- NEUROLOGICAL


Hx Neurological Disorder: No





- HEENT


Hx HEENT Problems: No





- RENAL


Hx Chronic Kidney Disease: No





- ENDOCRINE/METABOLIC


Hx Endocrine Disorders: No





- HEMATOLOGICAL/ONCOLOGICAL


Hx Blood Disorders: No





- INTEGUMENTARY


Hx Dermatological Problems: No





- MUSCULOSKELETAL/RHEUMATOLOGICAL


Hx Musculoskeletal Disorders: No


Hx Falls: No





- GASTROINTESTINAL


Hx Gastrointestinal Disorders: Yes


Hx Gastritis: Yes


Other/Comment: HX DUODENAL CARCINOID





- GENITOURINARY/GYNECOLOGICAL


Hx Genitourinary Disorders: No





- PSYCHIATRIC


Hx Psychophysiologic Disorder: No


Hx Substance Use: No





- SURGICAL HISTORY


Hx Surgeries: Yes


Hx Orthopedic Surgery: Yes (ORIF LEFT ELBOW W PLATE PLACEMENT)


Other/Comment: HX: COLON POLYPECTOMY





- ANESTHESIA


Hx Anesthesia: Yes


Hx Anesthesia Reactions: No


Hx Malignant Hyperthermia: No


Has any member of the family had a problem w/ anesthesia?: No





Meds


Allergies/Adverse Reactions: 


 Allergies











Allergy/AdvReac Type Severity Reaction Status Date / Time


 


iodine Allergy Severe ANAPHYLAXIS Verified 04/23/18 09:17














- Medications


Medications: 


 Current Medications





Acetaminophen (Tylenol 325mg Tab)  650 mg PO Q6 PRN


   PRN Reason: Fever >100.4 F


Benzocaine/Menthol (Cepacol Sore Throat)  1 emmanuel MT Q4 PRN


   PRN Reason: Sore Throat


   Last Admin: 06/06/18 13:06 Dose:  1 emmanuel


Famotidine (Pepcid)  20 mg IVP DAILY [facility]


   Last Admin: 06/11/18 10:00 Dose:  Not Given


Heparin Sodium (Porcine) (Heparin)  5,000 units SC Q8 [facility]


   Last Admin: 06/12/18 06:05 Dose:  5,000 units


Hydromorphone HCl (Dilaudid)  0.5 mg IVP Q4H PRN


   PRN Reason: Pain, moderate (4-7)


   Last Admin: 06/12/18 06:20 Dose:  0.5 mg


Fat Emulsion Intravenous (Intralipid 20%)  500 mls @ 60 mls/hr IV QOD@1800 [facility]


   Stop: 06/15/18 02:19


   Last Admin: 06/10/18 17:11 Dose:  60 mls/hr


Piperacillin Sod/Tazobactam (Sod 3.375 gm/ Sodium Chloride)  100 mls @ 200 mls/

hr IVPB Q6H [facility]


   PRN Reason: Protocol


   Last Admin: 06/12/18 04:25 Dose:  200 mls/hr


Lactated Ringer's (Lactated Ringer's)  1,000 mls @ 1,000 mls/hr IV .Q1H PRN


   PRN Reason: Hypotension


BUPIVACAINE 0.125%/0.9% NACL (Bupivacaine-Ns 0.125% On-Q )  600 mls @ 4 mls/

hr IJ ONCE ONE


   Stop: 06/17/18 20:59


Sodium Chloride 15 meq/Potassium Chloride 30 meq/Potassium Phosphate 15 mmole/

Calcium Gluconate 4.5 meq/Amino Acids  1,033.4274 mls @ 83 mls/hr IV .[id number] 

[facility]


   Stop: 06/12/18 17:59


   Last Admin: 06/12/18 06:05 Dose:  83 mls/hr


Metronidazole (Flagyl)  500 mg in 100 mls @ 100 mls/hr IVPB Q8H [facility]


   PRN Reason: Protocol


   Last Admin: 06/12/18 06:30 Dose:  100 mls/hr


Lactated Ringer's (Lactated Ringer's)  1,000 mls @ 100 mls/hr IV .Q10H ONE


   Stop: 06/12/18 16:39


   Last Admin: 06/12/18 07:00 Dose:  100 mls/hr


Lidocaine (Lidoderm)  1 ea TD DAILY@0800 [facility]


   Last Admin: 06/11/18 09:00 Dose:  Not Given


Metoclopramide HCl (Reglan)  5 mg IVP Q6 [facility]


   Last Admin: 06/12/18 05:00 Dose:  5 mg


Ondansetron HCl (Zofran Inj)  4 mg IV Q4 PRN


   PRN Reason: Nausea/Vomiting


   Last Admin: 06/07/18 12:11 Dose:  4 mg


Pantoprazole Sodium (Protonix Inj)  40 mg IVP DAILY [facility]


   Last Admin: 06/11/18 10:00 Dose:  Not Given


Tramadol HCl (Ultram)  50 mg PO TID PRN


   PRN Reason: Pain, moderate (4-7)


   Last Admin: 06/06/18 12:51 Dose:  50 mg











Physical Exam





- Constitutional


Appears: No Acute Distress





- Head Exam


Head Exam: NORMAL INSPECTION, NORMOCEPHALIC





- Eye Exam


Eye Exam: EOMI, Normal appearance, PERRL


Pupil Exam: NORMAL ACCOMODATION





- Respiratory Exam


Respiratory Exam: Clear to Auscultation Bilateral, NORMAL BREATHING PATTERN





- Cardiovascular Exam


Cardiovascular Exam: REGULAR RHYTHM





- GI/Abdominal Exam


GI & Abdominal Exam: Soft, Tenderness


Additional comments: 





dressing C/D/I- surgical site - has no erythema, cellulitis noted. 


Q ball in place 





- Extremities Exam


Extremities exam: Positive for: normal inspection, pedal pulses present.  

Negative for: pedal edema, tenderness





- Neurological Exam


Neurological exam: Alert, CN II-XII Intact, Oriented x3





- Psychiatric Exam


Psychiatric exam: Normal Affect, Normal Mood





- Skin


Skin Exam: Dry, Intact, Normal Color, Warm





Results





- Vital Signs


Recent Vital Signs: 


 Last Vital Signs











Temp  97.9 F   06/12/18 07:55


 


Pulse  104 H  06/12/18 07:55


 


Resp  20   06/12/18 07:55


 


BP  113/72   06/12/18 07:55


 


Pulse Ox  100   06/12/18 07:55














- Labs


Result Diagrams: 


 06/12/18 06:28





 06/12/18 06:27


Labs: 


 Laboratory Results - last 24 hr











  06/11/18 06/11/18 06/11/18





  09:21 13:52 13:52


 


WBC   6.7 


 


RBC   4.69 


 


Hgb   13.7 


 


Hct   40.5 


 


MCV   86.3 


 


MCH   29.1 


 


MCHC   33.7 


 


RDW   15.0 H 


 


Plt Count   372 


 


MPV   9.5 


 


Neut % (Auto)   82.1 H 


 


Lymph % (Auto)   13.2 L 


 


Mono % (Auto)   3.5 


 


Eos % (Auto)   0.8 


 


Baso % (Auto)   0.4 


 


Neut # (Auto)   5.5 


 


Lymph # (Auto)   0.9 L 


 


Mono # (Auto)   0.2 


 


Eos # (Auto)   0.1 


 


Baso # (Auto)   0.0 


 


Neutrophils % (Manual)   


 


Band Neutrophils %   


 


Lymphocytes % (Manual)   


 


Monocytes % (Manual)   


 


Platelet Estimate   


 


Large Platelets   


 


Hypochromasia (manual)   


 


Microcytosis (manual)   


 


Ovalocytes   


 


APTT   


 


Sodium    144


 


Potassium    4.0


 


Chloride    109 H


 


Carbon Dioxide    26


 


Anion Gap    14


 


BUN    37 H


 


Creatinine    1.5


 


Est GFR ( Amer)    56


 


Est GFR (Non-Af Amer)    46


 


Random Glucose    145 H


 


Calcium    7.7 L


 


Phosphorus   


 


Magnesium   


 


Total Bilirubin    1.8 H


 


AST    75 H D


 


ALT    57


 


Alkaline Phosphatase    88


 


Total Protein    5.3 L


 


Albumin    2.5 L


 


Globulin    2.8


 


Albumin/Globulin Ratio    0.9 L


 


Blood Type  A POSITIVE  


 


Antibody Screen  Negative  














  06/11/18 06/11/18 06/11/18





  13:52 22:00 22:56


 


WBC   13.7 H D 


 


RBC   4.88 


 


Hgb   13.5 


 


Hct   41.5 


 


MCV   85.1 


 


MCH   27.7 


 


MCHC   32.6 L 


 


RDW   14.8 H 


 


Plt Count   361 


 


MPV   9.2 


 


Neut % (Auto)   89.9 H 


 


Lymph % (Auto)   6.3 L 


 


Mono % (Auto)   3.7 


 


Eos % (Auto)   0.0 


 


Baso % (Auto)   0.1 


 


Neut # (Auto)   12.3 H 


 


Lymph # (Auto)   0.9 L 


 


Mono # (Auto)   0.5 


 


Eos # (Auto)   0.0 


 


Baso # (Auto)   0.0 


 


Neutrophils % (Manual)   61 


 


Band Neutrophils %   31 H* 


 


Lymphocytes % (Manual)   3 L 


 


Monocytes % (Manual)   5 


 


Platelet Estimate   Normal 


 


Large Platelets   Present 


 


Hypochromasia (manual)   Slight 


 


Microcytosis (manual)   Slight 


 


Ovalocytes   Slight 


 


APTT  24  D  


 


Sodium    141


 


Potassium    4.5


 


Chloride    110 H


 


Carbon Dioxide    23


 


Anion Gap    13


 


BUN    34 H


 


Creatinine    1.6 H


 


Est GFR ( Amer)    52


 


Est GFR (Non-Af Amer)    43


 


Random Glucose    139 H


 


Calcium    7.0 L


 


Phosphorus   


 


Magnesium   


 


Total Bilirubin    2.0 H


 


AST    50


 


ALT    59


 


Alkaline Phosphatase    67


 


Total Protein    4.4 L


 


Albumin    2.0 L


 


Globulin    2.4


 


Albumin/Globulin Ratio    0.8 L


 


Blood Type   


 


Antibody Screen   














  06/12/18 06/12/18





  06:27 06:28


 


WBC   11.5 H


 


RBC   4.65


 


Hgb   13.2


 


Hct   39.9


 


MCV   85.9


 


MCH   28.3


 


MCHC   33.0


 


RDW   15.0 H


 


Plt Count   371


 


MPV   9.9


 


Neut % (Auto)   88.1 H


 


Lymph % (Auto)   7.3 L


 


Mono % (Auto)   4.3


 


Eos % (Auto)   0.0


 


Baso % (Auto)   0.3


 


Neut # (Auto)   10.1 H


 


Lymph # (Auto)   0.8 L


 


Mono # (Auto)   0.5


 


Eos # (Auto)   0.0


 


Baso # (Auto)   0.0


 


Neutrophils % (Manual)   78 H


 


Band Neutrophils %   15 H*


 


Lymphocytes % (Manual)   5 L


 


Monocytes % (Manual)   2


 


Platelet Estimate   Normal


 


Large Platelets  


 


Hypochromasia (manual)  


 


Microcytosis (manual)  


 


Ovalocytes  


 


APTT  


 


Sodium  142 


 


Potassium  4.8 


 


Chloride  110 H 


 


Carbon Dioxide  24 


 


Anion Gap  13 


 


BUN  32 H 


 


Creatinine  1.4 


 


Est GFR ( Amer)  > 60 


 


Est GFR (Non-Af Amer)  50 


 


Random Glucose  171 H 


 


Calcium  7.1 L 


 


Phosphorus  3.7 


 


Magnesium  2.1 


 


Total Bilirubin  1.5 H 


 


AST  37 


 


ALT  52 


 


Alkaline Phosphatase  59 


 


Total Protein  4.4 L 


 


Albumin  2.1 L 


 


Globulin  2.3 


 


Albumin/Globulin Ratio  0.9 L 


 


Blood Type  


 


Antibody Screen  














Assessment & Plan





- Assessment and Plan (Free Text)


Assessment: 





This is a 72 year old male with PMHx of PVD, DVT, and ISMAEL who underwent 

laparoscopic right hemicolectomy (POD #12) on 5/31/2018 for tubular adenoma (5/ 31/18 -pathology). He had persistent postoperative ileus and evidence of small 

bowel obstruction with colonic ischemia with necrosis s/p exploratory 

laparoscopy ileocolectomy 6/11/18 (POD #1) he returned to the OR shortly after 

for ex lap and hemostasis 2/2 hypotensive shock. Admitted to ICU for close 

monitoring. 








Plan: 





Neuro: 


GCS 15





Cardio:


A: ISMAEL


- Sleep study performed 2017 





Pulm:


- Incentive Spirometer


- Duonebs as needed 





GI:


A: Tubular Adenoma


General Surgery- Dr. Jas Villalobos


- laparoscopic right hemicolectomy (POD #12) on 5/31/2018 for tubular adenoma (5 /31/18 -pathology). He had persistent postoperative ileus and evidence of small 

bowel obstruction with colonic ischemia with necrosis s/p exploratory 

laparoscopy ileocolectomy 6/11/18 (POD #1) he returned to the OR shortly after 

for ex lap and hemostasis 2/2 hypotensive shock.


- Monitor in ICU 





- Flagyl 500 Q8 (6/11) 


- OFirmev, dilaudid PRN for pain, Qball in place 


- LR @ 75, PPN feedings 





ID:


- Pan cultured 





Prophylaxis


- Protonix 


- SCDs, Hep Q8 


- PT eval


- Lines: peripherals- consult placed for PICC Line Access





DW Dr. Brady,


Rajani Iglesias DO, PGY1 





<Paco Brady - Last Filed: 06/12/18 14:31>





Meds





- Medications


Medications: 


 Current Medications





Albuterol/Ipratropium (Duoneb 3 Mg/0.5 Mg (3 Ml) Ud)  3 ml INH RQ4 PRN


   PRN Reason: Shortness of Breath


Benzocaine/Menthol (Cepacol Sore Throat)  1 emmanuel MT Q4 PRN


   PRN Reason: Sore Throat


   Last Admin: 06/06/18 13:06 Dose:  1 emmanuel


Heparin Sodium (Porcine) (Heparin)  5,000 units SC Q8 KATIE


   Last Admin: 06/12/18 06:05 Dose:  5,000 units


Hydromorphone HCl (Dilaudid)  0.5 mg IVP Q2 PRN


   PRN Reason: Pain, severe (8-10)


Piperacillin Sod/Tazobactam (Sod 3.375 gm/ Sodium Chloride)  100 mls @ 200 mls/

hr IVPB Q6H [facility]


   PRN Reason: Protocol


   Last Admin: 06/12/18 11:00 Dose:  200 mls/hr


BUPIVACAINE 0.125%/0.9% NACL (Bupivacaine-Ns 0.125% On-Q )  600 mls @ 4 mls/

hr IJ ONCE ONE


   Stop: 06/17/18 20:59


Sodium Chloride 15 meq/Potassium Chloride 30 meq/Potassium Phosphate 15 mmole/

Calcium Gluconate 4.5 meq/Amino Acids  1,033.4274 mls @ 83 mls/hr IV .[id number] 

[facility]


   Stop: 06/12/18 17:59


   Last Admin: 06/12/18 06:05 Dose:  83 mls/hr


Metronidazole (Flagyl)  500 mg in 100 mls @ 100 mls/hr IVPB Q8H [facility]


   PRN Reason: Protocol


   Last Admin: 06/12/18 06:30 Dose:  100 mls/hr


Acetaminophen 1,000 mg/ (Miscellaneous)  100 mls @ 400 mls/hr IV Q6 PRN


   PRN Reason: Pain, Mild (1-3)


   Stop: 06/13/18 10:34


Sodium Chloride 15 meq/Magnesium Sulfate 3 meq/Calcium Gluconate 4.5 meq/

Multivitamins/Vitamin C 10 ml/Amino Acids  1,024.1663 mls @ 63 mls/hr IV 

.[id number] [facility]


   Stop: 06/13/18 10:15


Sodium Chloride 15 meq/Magnesium Sulfate 3 meq/Calcium Gluconate 4.5 meq/Amino 

Acids  1,014.1663 mls @ 63 mls/hr IV .Q16H6M [facility]


   Stop: 06/13/18 17:59


Fat Emulsion Intravenous (Intralipid 20%)  500 mls @ 60 mls/hr IV QOD@1800 [facility]


   Stop: 06/18/18 18:01


Lactated Ringer's (Lactated Ringer's)  1,000 mls @ 75 mls/hr IV .[id number] [facility]


Lidocaine (Lidoderm)  1 ea TD DAILY@0800 [facility]


   Last Admin: 06/11/18 09:00 Dose:  Not Given


Metoclopramide HCl (Reglan)  5 mg IVP Q6 [facility]


   Last Admin: 06/12/18 11:43 Dose:  5 mg


Ondansetron HCl (Zofran Inj)  4 mg IV Q4 PRN


   PRN Reason: Nausea/Vomiting


   Last Admin: 06/12/18 09:24 Dose:  4 mg


Pantoprazole Sodium (Protonix Inj)  40 mg IVP DAILY [facility]


   Last Admin: 06/12/18 09:23 Dose:  40 mg











Results





- Vital Signs


Recent Vital Signs: 


 Last Vital Signs











Temp  97.9 F   06/12/18 07:55


 


Pulse  104 H  06/12/18 07:55


 


Resp  20   06/12/18 07:55


 


BP  113/72   06/12/18 07:55


 


Pulse Ox  100   06/12/18 07:55














- Labs


Result Diagrams: 


 06/12/18 06:28





 06/12/18 06:27


Labs: 


 Laboratory Results - last 24 hr











  06/10/18 06/11/18 06/11/18





  15:18 22:00 22:56


 


WBC   13.7 H D 


 


RBC   4.88 


 


Hgb   13.5 


 


Hct   41.5 


 


MCV   85.1 


 


MCH   27.7 


 


MCHC   32.6 L 


 


RDW   14.8 H 


 


Plt Count   361 


 


MPV   9.2 


 


Neut % (Auto)   89.9 H 


 


Lymph % (Auto)   6.3 L 


 


Mono % (Auto)   3.7 


 


Eos % (Auto)   0.0 


 


Baso % (Auto)   0.1 


 


Neut # (Auto)   12.3 H 


 


Lymph # (Auto)   0.9 L 


 


Mono # (Auto)   0.5 


 


Eos # (Auto)   0.0 


 


Baso # (Auto)   0.0 


 


Neutrophils % (Manual)   61 


 


Band Neutrophils %   31 H* 


 


Lymphocytes % (Manual)   3 L 


 


Monocytes % (Manual)   5 


 


Platelet Estimate   Normal 


 


Large Platelets   Present 


 


Hypochromasia (manual)   Slight 


 


Microcytosis (manual)   Slight 


 


Ovalocytes   Slight 


 


Sodium    141


 


Potassium    4.5


 


Chloride    110 H


 


Carbon Dioxide    23


 


Anion Gap    13


 


BUN    34 H


 


Creatinine    1.6 H


 


Est GFR ( Amer)    52


 


Est GFR (Non-Af Amer)    43


 


POC Glucose (mg/dL)   


 


Random Glucose    139 H


 


Calcium    7.0 L


 


Phosphorus   


 


Magnesium   


 


Total Bilirubin    2.0 H


 


AST    50


 


ALT    59


 


Alkaline Phosphatase    67


 


Total Protein    4.4 L


 


Albumin    2.0 L


 


Globulin    2.4


 


Albumin/Globulin Ratio    0.8 L


 


Urine Chloride  24 L  














  06/12/18 06/12/18 06/12/18





  06:27 06:28 11:18


 


WBC   11.5 H 


 


RBC   4.65 


 


Hgb   13.2 


 


Hct   39.9 


 


MCV   85.9 


 


MCH   28.3 


 


MCHC   33.0 


 


RDW   15.0 H 


 


Plt Count   371 


 


MPV   9.9 


 


Neut % (Auto)   88.1 H 


 


Lymph % (Auto)   7.3 L 


 


Mono % (Auto)   4.3 


 


Eos % (Auto)   0.0 


 


Baso % (Auto)   0.3 


 


Neut # (Auto)   10.1 H 


 


Lymph # (Auto)   0.8 L 


 


Mono # (Auto)   0.5 


 


Eos # (Auto)   0.0 


 


Baso # (Auto)   0.0 


 


Neutrophils % (Manual)   78 H 


 


Band Neutrophils %   15 H* 


 


Lymphocytes % (Manual)   5 L 


 


Monocytes % (Manual)   2 


 


Platelet Estimate   Normal 


 


Large Platelets   


 


Hypochromasia (manual)   


 


Microcytosis (manual)   


 


Ovalocytes   


 


Sodium  142  


 


Potassium  4.8  


 


Chloride  110 H  


 


Carbon Dioxide  24  


 


Anion Gap  13  


 


BUN  32 H  


 


Creatinine  1.4  


 


Est GFR ( Amer)  > 60  


 


Est GFR (Non-Af Amer)  50  


 


POC Glucose (mg/dL)    162 H


 


Random Glucose  171 H  


 


Calcium  7.1 L  


 


Phosphorus  3.7  


 


Magnesium  2.1  


 


Total Bilirubin  1.5 H  


 


AST  37  


 


ALT  52  


 


Alkaline Phosphatase  59  


 


Total Protein  4.4 L  


 


Albumin  2.1 L  


 


Globulin  2.3  


 


Albumin/Globulin Ratio  0.9 L  


 


Urine Chloride   














Attending/Attestation





- Attestation


I have personally seen and examined this patient.: Yes


I have fully participated in the care of the patient.: Yes


I have reviewed all pertinent clinical information: Yes


Notes (Text): 





06/12/18 14:31


Today: Tuesday, June 12, 2018





The Patient was seen and examined at the bedside, Medical records reviewed, and 

management issues were discussed and formulated with the house staff.


I have reviewed all the relevant clinical, laboratory, hemodynamic, 

radiographic data and medications


Events reviewed


Pain issues, skin care, head of the bed elevation, glycemic control were 

addressed.


Agree with above resident's assessment and treatment plans of care as 

transcribed in Dr. Iglesias note.

## 2018-06-13 LAB
ALBUMIN SERPL-MCNC: 2.1 G/DL (ref 3.5–5)
ALBUMIN/GLOB SERPL: 0.9 {RATIO} (ref 1–2.1)
ALT SERPL-CCNC: 47 U/L (ref 21–72)
ANISOCYTOSIS BLD QL SMEAR: SLIGHT
AST SERPL-CCNC: 24 U/L (ref 17–59)
BASOPHILS # BLD AUTO: 0 K/UL (ref 0–0.2)
BASOPHILS NFR BLD: 0.1 % (ref 0–2)
BUN SERPL-MCNC: 25 MG/DL (ref 9–20)
CALCIUM SERPL-MCNC: 7.1 MG/DL (ref 8.6–10.4)
EOSINOPHIL # BLD AUTO: 0.1 K/UL (ref 0–0.7)
EOSINOPHIL NFR BLD: 0.5 % (ref 0–4)
ERYTHROCYTE [DISTWIDTH] IN BLOOD BY AUTOMATED COUNT: 14.6 % (ref 11.5–14.5)
GFR NON-AFRICAN AMERICAN: 60
HGB BLD-MCNC: 11.1 G/DL (ref 12–18)
HYPOCHROMIC: SLIGHT
LYMPHOCYTE: 9 % (ref 20–40)
LYMPHOCYTES # BLD AUTO: 0.8 K/UL (ref 1–4.3)
LYMPHOCYTES NFR BLD AUTO: 6.7 % (ref 20–40)
MCH RBC QN AUTO: 28.5 PG (ref 27–31)
MCHC RBC AUTO-ENTMCNC: 33.1 G/DL (ref 33–37)
MCV RBC AUTO: 86.1 FL (ref 80–94)
MONOCYTE: 4 % (ref 0–10)
MONOCYTES # BLD: 0.5 K/UL (ref 0–0.8)
MONOCYTES NFR BLD: 4.1 % (ref 0–10)
MYELOCYTES NFR BLD: 1 % (ref 0–0)
NEUTROPHILS # BLD: 10.4 K/UL (ref 1.8–7)
NEUTROPHILS NFR BLD AUTO: 84 % (ref 50–75)
NEUTROPHILS NFR BLD AUTO: 88.6 % (ref 50–75)
NEUTS BAND NFR BLD: 2 % (ref 0–2)
NRBC BLD AUTO-RTO: 0 % (ref 0–2)
OVALOCYTES BLD QL SMEAR: SLIGHT
PLATELET # BLD EST: NORMAL 10*3/UL
PLATELET # BLD: 324 K/UL (ref 130–400)
PMV BLD AUTO: 9.7 FL (ref 7.2–11.7)
POIKILOCYTOSIS BLD QL SMEAR: SLIGHT
RBC # BLD AUTO: 3.89 MIL/UL (ref 4.4–5.9)
TOTAL CELLS COUNTED BLD: 100
WBC # BLD AUTO: 11.7 K/UL (ref 4.8–10.8)

## 2018-06-13 RX ADMIN — WATER SCH MLS/HR: 1 INJECTION INTRAMUSCULAR; INTRAVENOUS; SUBCUTANEOUS at 15:45

## 2018-06-13 RX ADMIN — SODIUM CHLORIDE SCH MLS/HR: 9 INJECTION, SOLUTION INTRAVENOUS at 10:26

## 2018-06-13 RX ADMIN — HYDROMORPHONE HYDROCHLORIDE PRN MG: 1 INJECTION, SOLUTION INTRAMUSCULAR; INTRAVENOUS; SUBCUTANEOUS at 10:42

## 2018-06-13 RX ADMIN — POTASSIUM & SODIUM PHOSPHATES POWDER PACK 280-160-250 MG SCH PKT: 280-160-250 PACK at 09:43

## 2018-06-13 RX ADMIN — HYDROMORPHONE HYDROCHLORIDE PRN MG: 1 INJECTION, SOLUTION INTRAMUSCULAR; INTRAVENOUS; SUBCUTANEOUS at 08:07

## 2018-06-13 RX ADMIN — POTASSIUM & SODIUM PHOSPHATES POWDER PACK 280-160-250 MG SCH PKT: 280-160-250 PACK at 11:01

## 2018-06-13 RX ADMIN — SODIUM CHLORIDE SCH MLS/HR: 9 INJECTION, SOLUTION INTRAVENOUS at 16:15

## 2018-06-13 RX ADMIN — WATER SCH MLS/HR: 1 INJECTION INTRAMUSCULAR; INTRAVENOUS; SUBCUTANEOUS at 06:30

## 2018-06-13 RX ADMIN — SODIUM CHLORIDE SCH MLS/HR: 9 INJECTION, SOLUTION INTRAVENOUS at 22:05

## 2018-06-13 RX ADMIN — HYDROMORPHONE HYDROCHLORIDE PRN MG: 1 INJECTION, SOLUTION INTRAMUSCULAR; INTRAVENOUS; SUBCUTANEOUS at 13:53

## 2018-06-13 RX ADMIN — SODIUM CHLORIDE SCH MLS/HR: 9 INJECTION, SOLUTION INTRAVENOUS at 05:00

## 2018-06-13 RX ADMIN — HYDROMORPHONE HYDROCHLORIDE PRN MG: 1 INJECTION, SOLUTION INTRAMUSCULAR; INTRAVENOUS; SUBCUTANEOUS at 19:29

## 2018-06-13 RX ADMIN — HYDROMORPHONE HYDROCHLORIDE PRN MG: 1 INJECTION, SOLUTION INTRAMUSCULAR; INTRAVENOUS; SUBCUTANEOUS at 02:00

## 2018-06-13 NOTE — CP.CCUPN
<Rajani Iglesias - Last Filed: 06/13/18 10:56>





CCU Subjective





- Physician Review


Subjective (Free Text): 





Patient was seen and examined at bedside. Patient reports his pain is well 

controlled. 





Patient transferred to TELEMETRY. 





CCU Objective





- Vital Signs / Intake & Output


Vital Signs (Last 4 hours): 


Vital Signs











  Pulse Resp BP Pulse Ox


 


 06/13/18 07:00  111 H  27 H  109/57 L  96











Intake and Output (Last 8hrs): 


 Intake & Output











 06/12/18 06/13/18 06/13/18





 22:59 06:59 14:59


 


Intake Total 1116 1473 138


 


Output Total 650 1250 


 


Balance 466 223 138


 


Weight  198 lb 


 


Intake:   


 


  Intake, IV Amount 1116 1473 138


 


    Right Antecubital 425 675 75


 


    Right Distal Port Hand 126 294 


 


    Right Hand 565 504 63


 


Output:   


 


  Gastric Amount  400 


 


    Nares  400 


 


  Drainage  50 


 


    Right Abdomen  50 


 


  Urine 650 800 


 


    Condom  800 


 


    Urethral (Guido) 450  


 


    Urine, Voided 200  














- Physical Exam


Other physical findings (Free Text): 





- Constitutional


Appears: No Acute Distress





- Head Exam


Head Exam: NORMAL INSPECTION, NORMOCEPHALIC





- Eye Exam


Eye Exam: EOMI, Normal appearance, PERRL


Pupil Exam: NORMAL ACCOMODATION





- Respiratory Exam


Respiratory Exam: Clear to Auscultation Bilateral, NORMAL BREATHING PATTERN





- Cardiovascular Exam


Cardiovascular Exam: REGULAR RHYTHM





- GI/Abdominal Exam


GI & Abdominal Exam: Soft, Tenderness


Additional comments: 





dressing C/D/I- surgical site - has no erythema, cellulitis noted. 


Q ball in place 





- Extremities Exam


Extremities exam: Positive for: normal inspection, pedal pulses present.  

Negative for: pedal edema, tenderness





- Neurological Exam


Neurological exam: Alert, CN II-XII Intact, Oriented x3





- Psychiatric Exam


Psychiatric exam: Normal Affect, Normal Mood





- Skin


Skin Exam: Dry, Intact, Normal Color, Warm





- Medications


Active Medications: 


Active Medications











Generic Name Dose Route Start Last Admin





  Trade Name Freq  PRN Reason Stop Dose Admin


 


Albuterol/Ipratropium  3 ml  06/12/18 10:44  





  Duoneb 3 Mg/0.5 Mg (3 Ml) Ud  INH   





  RQ4 PRN   





  Shortness of Breath   


 


Benzocaine/Menthol  1 emmanuel  06/03/18 09:57  06/06/18 13:06





  Cepacol Sore Throat  MT   1 emmanuel





  Q4 PRN   Administration





  Sore Throat   


 


Heparin Sodium (Porcine)  5,000 units  06/12/18 06:00  06/13/18 05:05





  Heparin  SC   5,000 units





  Q8 KATIE   Administration


 


Hydromorphone HCl  0.5 mg  06/12/18 13:39  06/13/18 10:42





  Dilaudid  IVP   0.5 mg





  Q2 PRN   Administration





  Pain, severe (8-10)   


 


Piperacillin Sod/Tazobactam  100 mls @ 200 mls/hr  06/10/18 10:30  06/13/18 10:

26





  Sod 3.375 gm/ Sodium Chloride  IVPB   200 mls/hr





  Q6H KATIE   Administration





  Protocol   


 


BUPIVACAINE 0.125%/0.9% NACL  600 mls @ 4 mls/hr  06/11/18 15:00  





  Bupivacaine-Ns 0.125% On-Q   IJ  06/17/18 20:59  





  ONCE ONE   


 


Metronidazole  500 mg in 100 mls @ 100 mls/hr  06/11/18 23:30  06/13/18 06:30





  Flagyl  IVPB   100 mls/hr





  Q8H KATIE   Administration





  Protocol   


 


Sodium Chloride 15 meq/  1,014.1663 mls @ 63 mls/hr  06/13/18 10:16  06/13/18 10

:26





Magnesium Sulfate 3 meq/  IV  06/13/18 17:59  63 mls/hr





Calcium Gluconate 4.5 meq/  .Q16H6M KATIE   Administration





Amino Acids     


 


Fat Emulsion Intravenous  500 mls @ 60 mls/hr  06/12/18 18:00  06/12/18 18:02





  Intralipid 20%  IV  06/18/18 18:01  60 mls/hr





  QOD@1800 KATIE   Administration


 


Lactated Ringer's  1,000 mls @ 75 mls/hr  06/12/18 13:38  06/13/18 05:00





  Lactated Ringer's  IV   75 mls/hr





  [id number] KATIE   Administration


 


Potassium Phosphate 30 mmole/  260 mls @ 63 mls/hr  06/13/18 07:56  06/13/18 09:

43





  Sodium Chloride  IV  06/13/18 11:54  63 mls/hr





  ONCE ONE   Administration


 


Lidocaine  1 ea  06/04/18 08:00  06/11/18 09:00





  Lidoderm  TD   Not Given





  DAILY@0800 [facility]   


 


Metoclopramide HCl  5 mg  06/08/18 06:00  06/13/18 05:05





  Reglan  IVP   5 mg





  Q6 KATIE   Administration


 


Ondansetron HCl  4 mg  05/31/18 16:00  06/12/18 09:24





  Zofran Inj  IV   4 mg





  Q4 PRN   Administration





  Nausea/Vomiting   


 


Pantoprazole Sodium  40 mg  06/08/18 10:00  06/13/18 10:26





  Protonix Inj  IVP   40 mg





  DAILY KATIE   Administration


 


Potassium Phos/Sodium Phos  1 pkt  06/13/18 08:00  06/13/18 09:43





  Neutra-Phos  PO  06/13/18 12:01  1 pkt





  Q4H KATIE   Administration














- Patient Studies


Lab Studies: 


 Microbiology Studies











 06/12/18 00:37 Blood Culture - Preliminary





 Blood    NO GROWTH AFTER 24 HOURS


 


 06/12/18 00:37 Blood Culture - Preliminary





 Blood    NO GROWTH AFTER 24 HOURS








 Lab Studies











  06/13/18 06/13/18 06/12/18 Range/Units





  06:07 06:04 11:18 


 


WBC  11.7 H    (4.8-10.8)  K/uL


 


RBC  3.89 L    (4.40-5.90)  Mil/uL


 


Hgb  11.1 L D    (12.0-18.0)  g/dL


 


Hct  33.5 L    (35.0-51.0)  %


 


MCV  86.1    (80.0-94.0)  fL


 


MCH  28.5    (27.0-31.0)  pg


 


MCHC  33.1    (33.0-37.0)  g/dL


 


RDW  14.6 H    (11.5-14.5)  %


 


Plt Count  324    (130-400)  K/uL


 


MPV  9.7    (7.2-11.7)  fL


 


Neut % (Auto)  88.6 H    (50.0-75.0)  %


 


Lymph % (Auto)  6.7 L    (20.0-40.0)  %


 


Mono % (Auto)  4.1    (0.0-10.0)  %


 


Eos % (Auto)  0.5    (0.0-4.0)  %


 


Baso % (Auto)  0.1    (0.0-2.0)  %


 


Neut # (Auto)  10.4 H    (1.8-7.0)  K/uL


 


Lymph # (Auto)  0.8 L    (1.0-4.3)  K/uL


 


Mono # (Auto)  0.5    (0.0-0.8)  K/uL


 


Eos # (Auto)  0.1    (0.0-0.7)  K/uL


 


Baso # (Auto)  0.0    (0.0-0.2)  K/uL


 


Neutrophils % (Manual)  84 H    (50-75)  %


 


Band Neutrophils %  2    (0-2)  %


 


Lymphocytes % (Manual)  9 L    (20-40)  %


 


Monocytes % (Manual)  4    (0-10)  %


 


Myelocytes %  1 H    (0-0)  %


 


Platelet Estimate  Normal    (NORMAL)  


 


Hypochromasia (manual)  Slight    


 


Poikilocytosis (manual  Slight    


 


Anisocytosis (manual)  Slight    


 


Ovalocytes  Slight    


 


Sodium   143   (132-148)  mmol/L


 


Potassium   3.8   (3.6-5.2)  mmol/L


 


Chloride   111 H   ()  mmol/L


 


Carbon Dioxide   25   (22-30)  mmol/L


 


Anion Gap   11   (10-20)  


 


BUN   25 H   (9-20)  mg/dL


 


Creatinine   1.2   (0.8-1.5)  mg/dL


 


Est GFR (African Amer)   > 60   


 


Est GFR (Non-Af Amer)   60   


 


POC Glucose (mg/dL)    162 H  ()  mg/dL


 


Random Glucose   156 H   ()  mg/dL


 


Calcium   7.1 L   (8.6-10.4)  mg/dl


 


Phosphorus   2.1 L   (2.5-4.5)  mg/dL


 


Magnesium   2.2   (1.6-2.3)  mg/dL


 


Total Bilirubin   0.8   (0.2-1.3)  mg/dL


 


AST   24   (17-59)  U/L


 


ALT   47   (21-72)  U/L


 


Alkaline Phosphatase   62   ()  U/L


 


Total Protein   4.4 L   (6.3-8.3)  g/dL


 


Albumin   2.1 L   (3.5-5.0)  g/dL


 


Globulin   2.4   (2.2-3.9)  gm/dL


 


Albumin/Globulin Ratio   0.9 L   (1.0-2.1)  








 Laboratory Results - last 24 hr











  06/12/18 06/13/18 06/13/18





  11:18 06:04 06:07


 


WBC    11.7 H


 


RBC    3.89 L


 


Hgb    11.1 L D


 


Hct    33.5 L


 


MCV    86.1


 


MCH    28.5


 


MCHC    33.1


 


RDW    14.6 H


 


Plt Count    324


 


MPV    9.7


 


Neut % (Auto)    88.6 H


 


Lymph % (Auto)    6.7 L


 


Mono % (Auto)    4.1


 


Eos % (Auto)    0.5


 


Baso % (Auto)    0.1


 


Neut # (Auto)    10.4 H


 


Lymph # (Auto)    0.8 L


 


Mono # (Auto)    0.5


 


Eos # (Auto)    0.1


 


Baso # (Auto)    0.0


 


Neutrophils % (Manual)    84 H


 


Band Neutrophils %    2


 


Lymphocytes % (Manual)    9 L


 


Monocytes % (Manual)    4


 


Myelocytes %    1 H


 


Platelet Estimate    Normal


 


Hypochromasia (manual)    Slight


 


Poikilocytosis (manual    Slight


 


Anisocytosis (manual)    Slight


 


Ovalocytes    Slight


 


Sodium   143 


 


Potassium   3.8 


 


Chloride   111 H 


 


Carbon Dioxide   25 


 


Anion Gap   11 


 


BUN   25 H 


 


Creatinine   1.2 


 


Est GFR ( Amer)   > 60 


 


Est GFR (Non-Af Amer)   60 


 


POC Glucose (mg/dL)  162 H  


 


Random Glucose   156 H 


 


Calcium   7.1 L 


 


Phosphorus   2.1 L 


 


Magnesium   2.2 


 


Total Bilirubin   0.8 


 


AST   24 


 


ALT   47 


 


Alkaline Phosphatase   62 


 


Total Protein   4.4 L 


 


Albumin   2.1 L 


 


Globulin   2.4 


 


Albumin/Globulin Ratio   0.9 L 














Critical Care Progress Note





- Nutrition


Nutrition: 


 Nutrition











 Category Date Time Status


 


 NPO Diet [DIET] Diets  06/10/18 Breakfast Active














Assessment/Plan





- Assessment and Plan (Free Text)


Assessment: 





This is a 72 year old male with PMHx of PVD, DVT, and ISMAEL who underwent 

laparoscopic right hemicolectomy (POD #12) on 5/31/2018 for tubular adenoma (5/ 31/18 -pathology). He had persistent postoperative ileus and evidence of small 

bowel obstruction with colonic ischemia with necrosis s/p exploratory 

laparoscopy ileocolectomy 6/11/18 (POD #1) he returned to the OR shortly after 

for ex lap and hemostasis 2/2 hypotensive shock. Admitted to ICU for close 

monitoring. 


Plan: 





Neuro: 


GCS 15





Cardio:


A: ISMAEL


- Sleep study performed 2017 





Pulm:


- Incentive Spirometer


- Duonebs as needed 





GI:


A: Tubular Adenoma


General Surgery- Dr. Jas Villalobos


- laparoscopic right hemicolectomy (POD #13) on 5/31/2018 for tubular adenoma (5

/31/18 -pathology). He had persistent postoperative ileus and evidence of small 

bowel obstruction with colonic ischemia with necrosis s/p exploratory 

laparoscopy ileocolectomy 6/11/18 (POD #2) he returned to the OR shortly after 

for ex lap and hemostasis 2/2 hypotensive shock.


- Monitor in ICU 





- Flagyl 500 Q8 (6/11), Zosyn  Q6 (6/11)


- OFirmev, dilaudid PRN for pain, Qball in place 


- LR @ 75, PPN feedings 





ID:


- Pan cultured 





Prophylaxis


- Protonix 


- SCDs, Hep Q8 


- PT eval


- Lines: peripherals- consult placed for PICC Line Access





Disposition: Patient downgraded to TELEMETRY 





DW Dr. Valle,


Rajani Iglesias DO, PGY1 





<Aman Valle S - Last Filed: 06/13/18 17:33>





CCU Objective





- Vital Signs / Intake & Output


Vital Signs (Last 4 hours): 


Vital Signs











  Temp Pulse Resp BP Pulse Ox


 


 06/13/18 17:25  98.0 F  96 H  20  115/71  96


 


 06/13/18 15:48  98.5 F  92 H  18  111/65  98











Intake and Output (Last 8hrs): 


 Intake & Output











 06/13/18 06/13/18 06/13/18





 06:59 14:59 22:59


 


Intake Total 1473 853 405


 


Output Total 1250 775 230


 


Balance 223 78 175


 


Weight 198 lb 198 lb 


 


Intake:   


 


  Intake, IV Amount 1473 853 225


 


    Right Antecubital 675 475 


 


    Right Distal Port Hand 294  


 


    Right Hand 504 378 225


 


  TPN/PPN   180


 


Output:   


 


  Gastric Amount 400  200


 


    Nares 400  200


 


  Drainage 50  30


 


    Right Abdomen 50  30


 


  Urine 800 775 


 


    Condom 800 775 


 


Other:   


 


  # Voids   


 


    Condom  1 


 


  # Bowel Movements  0 














- Medications


Active Medications: 


Active Medications











Generic Name Dose Route Start Last Admin





  Trade Name Freq  PRN Reason Stop Dose Admin


 


Albuterol/Ipratropium  3 ml  06/12/18 10:44  





  Duoneb 3 Mg/0.5 Mg (3 Ml) Ud  INH   





  RQ4 PRN   





  Shortness of Breath   


 


Benzocaine/Menthol  1 emmanuel  06/03/18 09:57  06/06/18 13:06





  Cepacol Sore Throat  MT   1 emmanuel





  Q4 PRN   Administration





  Sore Throat   


 


Heparin Sodium (Porcine)  5,000 units  06/12/18 06:00  06/13/18 13:48





  Heparin  SC   5,000 units





  Q8 KATIE   Administration


 


Hydromorphone HCl  0.5 mg  06/12/18 13:39  06/13/18 13:53





  Dilaudid  IVP   0.5 mg





  Q2 PRN   Administration





  Pain, severe (8-10)   


 


Piperacillin Sod/Tazobactam  100 mls @ 200 mls/hr  06/10/18 10:30  06/13/18 16:

15





  Sod 3.375 gm/ Sodium Chloride  IVPB   200 mls/hr





  Q6H [facility]   Administration





  Protocol   


 


BUPIVACAINE 0.125%/0.9% NACL  600 mls @ 4 mls/hr  06/11/18 15:00  





  Bupivacaine-Ns 0.125% On-Q   IJ  06/17/18 20:59  





  ONCE ONE   


 


Metronidazole  500 mg in 100 mls @ 100 mls/hr  06/11/18 23:30  06/13/18 15:45





  Flagyl  IVPB   100 mls/hr





  Q8H [facility]   Administration





  Protocol   


 


Sodium Chloride 15 meq/  1,014.1663 mls @ 63 mls/hr  06/13/18 10:16  06/13/18 10

:26





Magnesium Sulfate 3 meq/  IV  06/13/18 17:59  63 mls/hr





Calcium Gluconate 4.5 meq/  .Q16H6M KATIE   Administration





Amino Acids     


 


Fat Emulsion Intravenous  500 mls @ 60 mls/hr  06/12/18 18:00  06/12/18 18:02





  Intralipid 20%  IV  06/18/18 18:01  60 mls/hr





  QOD@1800 KATIE   Administration


 


Lactated Ringer's  1,000 mls @ 75 mls/hr  06/12/18 13:38  06/13/18 16:54





  Lactated Ringer's  IV   Not Given





  .[id number] KATIE   


 


Sodium Chloride 15 meq/  1,024.1663 mls @ 63 mls/hr  06/13/18 18:00  





Magnesium Sulfate 3 meq/  IV  06/14/18 10:15  





Calcium Gluconate 4.5 meq/  .[id number] [facility]   





Multivitamins/Vitamin C 10 ml/     





Amino Acids     


 


Sodium Chloride 15 meq/  1,014.1663 mls @ 63 mls/hr  06/14/18 10:16  





Magnesium Sulfate 3 meq/  IV  06/14/18 17:59  





Calcium Gluconate 4.5 meq/  .Q16H6M KATIE   





Amino Acids     


 


BUPIVACAINE 0.125%/0.9% NACL  600 mls @ 4 mls/hr  06/13/18 17:17  





  Bupivacaine-Ns 0.125% On-Q   IJ  06/19/18 23:16  





  ONCE ONE   


 


Lidocaine  1 ea  06/04/18 08:00  06/13/18 11:01





  Lidoderm  TD   1 ea





  DAILY@0800 KATIE   Administration


 


Metoclopramide HCl  5 mg  06/08/18 06:00  06/13/18 17:02





  Reglan  IVP   5 mg





  Q6 KATIE   Administration


 


Ondansetron HCl  4 mg  05/31/18 16:00  06/12/18 09:24





  Zofran Inj  IV   4 mg





  Q4 PRN   Administration





  Nausea/Vomiting   


 


Pantoprazole Sodium  40 mg  06/08/18 10:00  06/13/18 10:26





  Protonix Inj  IVP   40 mg





  DAILY KATIE   Administration














- Patient Studies


Lab Studies: 


 Microbiology Studies











 06/11/18 22:37 MRSA Culture (Admit) - Final





 Naris    MRSA NOT DETECTED


 


 06/12/18 00:37 Urine Culture - Final





 Urine,Catheterized    No Growth (<1,000 CFU/ML)


 


 06/12/18 00:37 Blood Culture - Preliminary





 Blood    NO GROWTH AFTER 24 HOURS


 


 06/12/18 00:37 Blood Culture - Preliminary





 Blood    NO GROWTH AFTER 24 HOURS








 Lab Studies











  06/13/18 06/13/18 Range/Units





  06:07 06:04 


 


WBC  11.7 H   (4.8-10.8)  K/uL


 


RBC  3.89 L   (4.40-5.90)  Mil/uL


 


Hgb  11.1 L D   (12.0-18.0)  g/dL


 


Hct  33.5 L   (35.0-51.0)  %


 


MCV  86.1   (80.0-94.0)  fL


 


MCH  28.5   (27.0-31.0)  pg


 


MCHC  33.1   (33.0-37.0)  g/dL


 


RDW  14.6 H   (11.5-14.5)  %


 


Plt Count  324   (130-400)  K/uL


 


MPV  9.7   (7.2-11.7)  fL


 


Neut % (Auto)  88.6 H   (50.0-75.0)  %


 


Lymph % (Auto)  6.7 L   (20.0-40.0)  %


 


Mono % (Auto)  4.1   (0.0-10.0)  %


 


Eos % (Auto)  0.5   (0.0-4.0)  %


 


Baso % (Auto)  0.1   (0.0-2.0)  %


 


Neut # (Auto)  10.4 H   (1.8-7.0)  K/uL


 


Lymph # (Auto)  0.8 L   (1.0-4.3)  K/uL


 


Mono # (Auto)  0.5   (0.0-0.8)  K/uL


 


Eos # (Auto)  0.1   (0.0-0.7)  K/uL


 


Baso # (Auto)  0.0   (0.0-0.2)  K/uL


 


Neutrophils % (Manual)  84 H   (50-75)  %


 


Band Neutrophils %  2   (0-2)  %


 


Lymphocytes % (Manual)  9 L   (20-40)  %


 


Monocytes % (Manual)  4   (0-10)  %


 


Myelocytes %  1 H   (0-0)  %


 


Platelet Estimate  Normal   (NORMAL)  


 


Hypochromasia (manual)  Slight   


 


Poikilocytosis (manual  Slight   


 


Anisocytosis (manual)  Slight   


 


Ovalocytes  Slight   


 


Sodium   143  (132-148)  mmol/L


 


Potassium   3.8  (3.6-5.2)  mmol/L


 


Chloride   111 H  ()  mmol/L


 


Carbon Dioxide   25  (22-30)  mmol/L


 


Anion Gap   11  (10-20)  


 


BUN   25 H  (9-20)  mg/dL


 


Creatinine   1.2  (0.8-1.5)  mg/dL


 


Est GFR (African Amer)   > 60  


 


Est GFR (Non-Af Amer)   60  


 


Random Glucose   156 H  ()  mg/dL


 


Calcium   7.1 L  (8.6-10.4)  mg/dl


 


Phosphorus   2.1 L  (2.5-4.5)  mg/dL


 


Magnesium   2.2  (1.6-2.3)  mg/dL


 


Total Bilirubin   0.8  (0.2-1.3)  mg/dL


 


AST   24  (17-59)  U/L


 


ALT   47  (21-72)  U/L


 


Alkaline Phosphatase   62  ()  U/L


 


Total Protein   4.4 L  (6.3-8.3)  g/dL


 


Albumin   2.1 L  (3.5-5.0)  g/dL


 


Globulin   2.4  (2.2-3.9)  gm/dL


 


Albumin/Globulin Ratio   0.9 L  (1.0-2.1)  








 Laboratory Results - last 24 hr











  06/13/18 06/13/18





  06:04 06:07


 


WBC   11.7 H


 


RBC   3.89 L


 


Hgb   11.1 L D


 


Hct   33.5 L


 


MCV   86.1


 


MCH   28.5


 


MCHC   33.1


 


RDW   14.6 H


 


Plt Count   324


 


MPV   9.7


 


Neut % (Auto)   88.6 H


 


Lymph % (Auto)   6.7 L


 


Mono % (Auto)   4.1


 


Eos % (Auto)   0.5


 


Baso % (Auto)   0.1


 


Neut # (Auto)   10.4 H


 


Lymph # (Auto)   0.8 L


 


Mono # (Auto)   0.5


 


Eos # (Auto)   0.1


 


Baso # (Auto)   0.0


 


Neutrophils % (Manual)   84 H


 


Band Neutrophils %   2


 


Lymphocytes % (Manual)   9 L


 


Monocytes % (Manual)   4


 


Myelocytes %   1 H


 


Platelet Estimate   Normal


 


Hypochromasia (manual)   Slight


 


Poikilocytosis (manual   Slight


 


Anisocytosis (manual)   Slight


 


Ovalocytes   Slight


 


Sodium  143 


 


Potassium  3.8 


 


Chloride  111 H 


 


Carbon Dioxide  25 


 


Anion Gap  11 


 


BUN  25 H 


 


Creatinine  1.2 


 


Est GFR ( Amer)  > 60 


 


Est GFR (Non-Af Amer)  60 


 


Random Glucose  156 H 


 


Calcium  7.1 L 


 


Phosphorus  2.1 L 


 


Magnesium  2.2 


 


Total Bilirubin  0.8 


 


AST  24 


 


ALT  47 


 


Alkaline Phosphatase  62 


 


Total Protein  4.4 L 


 


Albumin  2.1 L 


 


Globulin  2.4 


 


Albumin/Globulin Ratio  0.9 L 














Critical Care Progress Note





- Nutrition


Nutrition: 


 Nutrition











 Category Date Time Status


 


 NPO Diet [DIET] Diets  06/10/18 Breakfast Active














Attending/Attestation





- Attestation


I have personally seen and examined this patient.: Yes


I have fully participated in the care of the patient.: Yes


I have reviewed all pertinent clinical information: Yes


Notes (Text): 





06/13/18 17:28


patient seen and examined in the intensive care unit.


status post laparoscopic right hemicolectomy (POD #13) on 5/31/2018 for tubular 

adenoma (5/31/18 -pathology). He had persistent postoperative ileus and 

evidence of small bowel obstruction with colonic ischemia with necrosis s/p 

exploratory laparoscopy ileocolectomy 6/11/18 (POD #2) he returned to the OR 

shortly after for ex lap and hemostasis 2/2 hypotensive shock.


stable for transfer to floor

## 2018-06-13 NOTE — RAD
HISTORY:

verify right PICC  



COMPARISON:

6/12/2018 



FINDINGS:



LUNGS:

No active pulmonary disease.



PLEURA:

No significant pleural effusion identified, no pneumothorax apparent.



CARDIOVASCULAR:

New right PICC catheter terminating in the region of the right 

atrium. No pneumothorax. Nasogastric tube extends beneath the 

diaphragm. Normal heart size.  No congestive change.



OSSEOUS STRUCTURES:

No significant abnormalities.



VISUALIZED UPPER ABDOMEN:

Normal.



OTHER FINDINGS:

None.



IMPRESSION:

New right PICC catheter.  Otherwise no change.

## 2018-06-13 NOTE — CP.PCM.PN
<Apollo Sigala - Last Filed: 06/13/18 11:32>





Subjective





- Date & Time of Evaluation


Date of Evaluation: 06/13/18


Time of Evaluation: 10:41





- Subjective


Subjective: 


General Surgery Progress Note for Dr. Villalobos





This 72M was seen and evaluated this AM at bedside. No acute events reported 

overnight however the patient had episodes of urinary incontinence so a [place] 

condom catheter was placed. He complains of pain with ambulation. 











Objective





- Vital Signs/Intake and Output


Vital Signs (last 24 hours): 


 











Temp Pulse Resp BP Pulse Ox


 


 99 F   111 H  27 H  109/57 L  96 


 


 06/13/18 04:00  06/13/18 07:00  06/13/18 07:00  06/13/18 07:00  06/13/18 07:00








Intake and Output: 


 











 06/13/18 06/13/18





 06:59 18:59


 


Intake Total 2143 138


 


Output Total 1450 


 


Balance 693 138














- Medications


Medications: 


 Current Medications





Albuterol/Ipratropium (Duoneb 3 Mg/0.5 Mg (3 Ml) Ud)  3 ml INH RQ4 PRN


   PRN Reason: Shortness of Breath


Benzocaine/Menthol (Cepacol Sore Throat)  1 emmanuel MT Q4 PRN


   PRN Reason: Sore Throat


   Last Admin: 06/06/18 13:06 Dose:  1 emmanuel


Heparin Sodium (Porcine) (Heparin)  5,000 units SC Q8 KATIE


   Last Admin: 06/13/18 05:05 Dose:  5,000 units


Hydromorphone HCl (Dilaudid)  0.5 mg IVP Q2 PRN


   PRN Reason: Pain, severe (8-10)


   Last Admin: 06/13/18 08:07 Dose:  0.5 mg


Piperacillin Sod/Tazobactam (Sod 3.375 gm/ Sodium Chloride)  100 mls @ 200 mls/

hr IVPB Q6H KATIE


   PRN Reason: Protocol


   Last Admin: 06/13/18 10:26 Dose:  200 mls/hr


BUPIVACAINE 0.125%/0.9% NACL (Bupivacaine-Ns 0.125% On-Q )  600 mls @ 4 mls/

hr IJ ONCE ONE


   Stop: 06/17/18 20:59


Metronidazole (Flagyl)  500 mg in 100 mls @ 100 mls/hr IVPB Q8H KATIE


   PRN Reason: Protocol


   Last Admin: 06/13/18 06:30 Dose:  100 mls/hr


Sodium Chloride 15 meq/Magnesium Sulfate 3 meq/Calcium Gluconate 4.5 meq/Amino 

Acids  1,014.1663 mls @ 63 mls/hr IV .Q16H6M [facility]


   Stop: 06/13/18 17:59


   Last Admin: 06/13/18 10:26 Dose:  63 mls/hr


Fat Emulsion Intravenous (Intralipid 20%)  500 mls @ 60 mls/hr IV QOD@1800 [facility]


   Stop: 06/18/18 18:01


   Last Admin: 06/12/18 18:02 Dose:  60 mls/hr


Lactated Ringer's (Lactated Ringer's)  1,000 mls @ 75 mls/hr IV .[id number] [facility]


   Last Admin: 06/13/18 05:00 Dose:  75 mls/hr


Potassium Phosphate 30 mmole/ (Sodium Chloride)  260 mls @ 63 mls/hr IV ONCE ONE


   Stop: 06/13/18 11:54


   Last Admin: 06/13/18 09:43 Dose:  63 mls/hr


Lidocaine (Lidoderm)  1 ea TD DAILY@0800 [facility]


   Last Admin: 06/11/18 09:00 Dose:  Not Given


Metoclopramide HCl (Reglan)  5 mg IVP Q6 [facility]


   Last Admin: 06/13/18 05:05 Dose:  5 mg


Ondansetron HCl (Zofran Inj)  4 mg IV Q4 PRN


   PRN Reason: Nausea/Vomiting


   Last Admin: 06/12/18 09:24 Dose:  4 mg


Pantoprazole Sodium (Protonix Inj)  40 mg IVP DAILY [facility]


   Last Admin: 06/13/18 10:26 Dose:  40 mg


Potassium Phos/Sodium Phos (Neutra-Phos)  1 pkt PO Q4H [facility]


   Stop: 06/13/18 12:01


   Last Admin: 06/13/18 09:43 Dose:  1 pkt











- Labs


Labs: 


 





 06/13/18 06:07 





 06/13/18 06:04 





 











PT  12.0 SECONDS (9.7-12.2)   06/11/18  06:15    


 


INR  1.1   06/11/18  06:15    


 


APTT  24 SECONDS (21-34)  D 06/11/18  13:52    














- Constitutional


Appears: Non-toxic, No Acute Distress





- Head Exam


Head Exam: ATRAUMATIC, NORMOCEPHALIC





- Eye Exam


Eye Exam: EOMI.  absent: Scleral icterus





- ENT Exam


ENT Exam: Mucous Membranes Moist


Additional comments: 





NGT in place on suction with 400cc bilious output over 24 hours





- Respiratory Exam


Respiratory Exam: NORMAL BREATHING PATTERN


Additional comments: 





2L NC, 





- Cardiovascular Exam


Cardiovascular Exam: Tachycardia, +S1, +S2





- GI/Abdominal Exam


GI & Abdominal Exam: Soft.  absent: Distended, Firm, Guarding


Additional comments: 


Appropriately tender, 








Assessment and Plan





- Assessment and Plan (Free Text)


Assessment: 


72M POD 13 S/P robotic right hemicolectomy due to incomplete excision of 

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








Neuro: GCS 15, AAOX3, No focal deficits, 2X .5MG administered this AM, Lidoderm 

patch geovany incisional, OnQ pump at 7ml/hr


CV: HR  , MAP 71-84, No Cardiac Meds


Pulm: RR 27, O2 Sat 95%-97% at rest on 3L NC, , Duoneb Q4 PRN, CXR no 

significant effusion of active pulmonary disease


Renal: No [place] or Gu[place] catheter, Urine output +/- 100cc/hr / 1cc/kg/hr, LR 75/

ml/hr total fluids (with  cc/hr)


Electrolytes: K 3.8, Phos 2.1, repleted with 30mm kphos IV 


ID: WBC 11.7, Bands 2, Wound cx 6/9 E. Coli , Blood cultures 6/12 pending, 

Zosyn 3.375 Q6, Flagyl 500mg Q8


Heme: Hgb/Hct 11.1/35.5 Plt 324, Hsq 5000Q8


GI: POD# 2 s/p s/p ex-lap with ileo-colic resection, NGT 400cc bilious /24hr, 

NGT remains on suction, Black drain 50cc serosanguinous overnight, keep to 

buyulb suction, NPO, PPN @63ml/hr, 500cc lipid emulsion QD,Will consult 

nutrition for TPN orders, history of GERD, Protonix 40IV BID


Endo: Pt euglycemic, no active issues


Psych: Pt is in good spirits


Other: Pt currently with SCDs, Seen and treated by physcial therapy today, 

Ambulating





Further Recs Per Dr. Wilfredo Sigala PGY2


[phone]





<Jas Villalobos - Last Filed: 06/14/18 09:31>





Objective





- Vital Signs/Intake and Output


Vital Signs (last 24 hours): 


 











Temp Pulse Resp BP Pulse Ox


 


 98.7 F   95 H  18   109/63   93 L


 


 06/14/18 07:30  06/14/18 08:03  06/14/18 07:30  06/14/18 07:30  06/14/18 07:30








Intake and Output: 


 











 06/14/18 06/14/18





 06:59 18:59


 


Intake Total 828 


 


Output Total 1805 420


 


Balance -977 -420














- Medications


Medications: 


 Current Medications





Albuterol/Ipratropium (Duoneb 3 Mg/0.5 Mg (3 Ml) Ud)  3 ml INH RQ4 PRN


   PRN Reason: Shortness of Breath


Benzocaine/Menthol (Cepacol Sore Throat)  1 emmanuel MT Q4 PRN


   PRN Reason: Sore Throat


   Last Admin: 06/06/18 13:06 Dose:  1 emmanuel


Heparin Sodium (Porcine) (Heparin)  5,000 units SC Q8 KATIE


   Last Admin: 06/14/18 05:44 Dose:  5,000 units


Hydromorphone HCl (Dilaudid)  0.5 mg IVP Q2 PRN


   PRN Reason: Pain, severe (8-10)


   Last Admin: 06/14/18 00:17 Dose:  0.5 mg


Piperacillin Sod/Tazobactam (Sod 3.375 gm/ Sodium Chloride)  100 mls @ 200 mls/

hr IVPB Q6H KATIE


   PRN Reason: Protocol


   Last Admin: 06/14/18 03:35 Dose:  200 mls/hr


BUPIVACAINE 0.125%/0.9% NACL (Bupivacaine-Ns 0.125% On-Q )  600 mls @ 4 mls/

hr IJ ONCE ONE


   Stop: 06/17/18 20:59


Metronidazole (Flagyl)  500 mg in 100 mls @ 100 mls/hr IVPB Q8H KATIE


   PRN Reason: Protocol


   Last Admin: 06/14/18 06:30 Dose:  100 mls/hr


Fat Emulsion Intravenous (Intralipid 20%)  500 mls @ 60 mls/hr IV QOD@1800 [facility]


   Stop: 06/18/18 18:01


   Last Admin: 06/12/18 18:02 Dose:  60 mls/hr


Sodium Chloride 15 meq/Magnesium Sulfate 3 meq/Calcium Gluconate 4.5 meq/

Multivitamins/Vitamin C 10 ml/Amino Acids  1,024.1663 mls @ 63 mls/hr IV 

.[id number] [facility]


   Stop: 06/14/18 10:15


   Last Admin: 06/13/18 19:34 Dose:  63 mls/hr


Sodium Chloride 15 meq/Magnesium Sulfate 3 meq/Calcium Gluconate 4.5 meq/Amino 

Acids  1,014.1663 mls @ 63 mls/hr IV .Q16H6M [facility]


   Stop: 06/14/18 17:59


BUPIVACAINE 0.125%/0.9% NACL (Bupivacaine-Ns 0.125% On-Q )  600 mls @ 4 mls/

hr IJ ONCE ONE


   Stop: 06/19/18 23:16


   Last Admin: 06/13/18 22:47 Dose:  4 mls/hr


Lactated Ringer's (Lactated Ringer's)  1,000 mls @ 20 mls/hr IV .Q24H [facility]


   Last Admin: 06/14/18 08:27 Dose:  Not Given


Ketorolac Tromethamine (Toradol)  15 mg IVP Q6 PRN


   PRN Reason: Pain, moderate (4-7)


Lidocaine (Lidoderm)  1 ea TD DAILY@0800 [facility]


   Last Admin: 06/14/18 08:34 Dose:  1 ea


Metoclopramide HCl (Reglan)  5 mg IVP Q6 [facility]


   Last Admin: 06/14/18 05:44 Dose:  5 mg


Ondansetron HCl (Zofran Inj)  4 mg IV Q4 PRN


   PRN Reason: Nausea/Vomiting


   Last Admin: 06/12/18 09:24 Dose:  4 mg


Pantoprazole Sodium (Protonix Inj)  40 mg IVP DAILY [facility]


   Last Admin: 06/13/18 10:26 Dose:  40 mg











- Labs


Labs: 


 





 06/14/18 07:09 





 06/14/18 07:09 





 











PT  12.0 SECONDS (9.7-12.2)   06/11/18  06:15    


 


INR  1.1   06/11/18  06:15    


 


APTT  24 SECONDS (21-34)  D 06/11/18  13:52    














Assessment and Plan





- Assessment and Plan (Free Text)


Plan: 


Patient having PICC placed when attempted to see this am. Discussed events and 

plan of care with ICU team on rounds. No acute events. HDS. Labs improving. Ok 

to transfer to floor with telemetry. Continue current plans. Await return of 

bowel function. Ileus to be expected.

## 2018-06-13 NOTE — CP.PCM.PN
Subjective





- Date & Time of Evaluation


Date of Evaluation: 06/13/18


Time of Evaluation: 12:45





- Subjective


Subjective: 





Events noted. Pt is now S/P eploratory lap & Rt ileocolectomy on 6/11 for 

persistent ileus & colonic aschemia & necrosis . Transferred to ICU because of 

hypotension & tachycardia.


Seen in follow up for renal evaluation





Objective





- Vital Signs/Intake and Output


Vital Signs (last 24 hours): 


 











Temp Pulse Resp BP Pulse Ox


 


 99.3 F   101 H  23   96/62 L  96 


 


 06/13/18 12:00  06/13/18 12:00  06/13/18 12:00  06/13/18 12:00  06/13/18 12:00








Intake and Output: 


 











 06/13/18 06/13/18





 06:59 18:59


 


Intake Total 2143 853


 


Output Total 1450 775


 


Balance 693 78














- Medications


Medications: 


 Current Medications





Albuterol/Ipratropium (Duoneb 3 Mg/0.5 Mg (3 Ml) Ud)  3 ml INH RQ4 PRN


   PRN Reason: Shortness of Breath


Benzocaine/Menthol (Cepacol Sore Throat)  1 emmanuel MT Q4 PRN


   PRN Reason: Sore Throat


   Last Admin: 06/06/18 13:06 Dose:  1 emmanuel


Heparin Sodium (Porcine) (Heparin)  5,000 units SC Q8 KATIE


   Last Admin: 06/13/18 05:05 Dose:  5,000 units


Hydromorphone HCl (Dilaudid)  0.5 mg IVP Q2 PRN


   PRN Reason: Pain, severe (8-10)


   Last Admin: 06/13/18 10:42 Dose:  0.5 mg


Piperacillin Sod/Tazobactam (Sod 3.375 gm/ Sodium Chloride)  100 mls @ 200 mls/

hr IVPB Q6H KATIE


   PRN Reason: Protocol


   Last Admin: 06/13/18 10:26 Dose:  200 mls/hr


BUPIVACAINE 0.125%/0.9% NACL (Bupivacaine-Ns 0.125% On-Q )  600 mls @ 4 mls/

hr IJ ONCE ONE


   Stop: 06/17/18 20:59


Metronidazole (Flagyl)  500 mg in 100 mls @ 100 mls/hr IVPB Q8H KATIE


   PRN Reason: Protocol


   Last Admin: 06/13/18 06:30 Dose:  100 mls/hr


Sodium Chloride 15 meq/Magnesium Sulfate 3 meq/Calcium Gluconate 4.5 meq/Amino 

Acids  1,014.1663 mls @ 63 mls/hr IV .Q16H6M [facility]


   Stop: 06/13/18 17:59


   Last Admin: 06/13/18 10:26 Dose:  63 mls/hr


Fat Emulsion Intravenous (Intralipid 20%)  500 mls @ 60 mls/hr IV QOD@1800 [facility]


   Stop: 06/18/18 18:01


   Last Admin: 06/12/18 18:02 Dose:  60 mls/hr


Lactated Ringer's (Lactated Ringer's)  1,000 mls @ 75 mls/hr IV .[id number] [facility]


   Last Admin: 06/13/18 05:00 Dose:  75 mls/hr


Sodium Chloride 15 meq/Magnesium Sulfate 3 meq/Calcium Gluconate 4.5 meq/

Multivitamins/Vitamin C 10 ml/Amino Acids  1,024.1663 mls @ 63 mls/hr IV 

.[id number] [facility]


   Stop: 06/14/18 10:15


Sodium Chloride 15 meq/Magnesium Sulfate 3 meq/Calcium Gluconate 4.5 meq/Amino 

Acids  1,014.1663 mls @ 63 mls/hr IV .Q16H6M [facility]


   Stop: 06/14/18 17:59


Lidocaine (Lidoderm)  1 ea TD DAILY@0800 [facility]


   Last Admin: 06/13/18 11:01 Dose:  1 ea


Metoclopramide HCl (Reglan)  5 mg IVP Q6 [facility]


   Last Admin: 06/13/18 11:38 Dose:  5 mg


Ondansetron HCl (Zofran Inj)  4 mg IV Q4 PRN


   PRN Reason: Nausea/Vomiting


   Last Admin: 06/12/18 09:24 Dose:  4 mg


Pantoprazole Sodium (Protonix Inj)  40 mg IVP DAILY [facility]


   Last Admin: 06/13/18 10:26 Dose:  40 mg











- Labs


Labs: 


 





 06/13/18 06:07 





 06/13/18 06:04 





 











PT  12.0 SECONDS (9.7-12.2)   06/11/18  06:15    


 


INR  1.1   06/11/18  06:15    


 


APTT  24 SECONDS (21-34)  D 06/11/18  13:52    














- Constitutional


Appears: No Acute Distress





- Respiratory Exam


Respiratory Exam: NORMAL BREATHING PATTERN


Additional comments: 





lungs clear





- Cardiovascular Exam


Cardiovascular Exam: REGULAR RHYTHM





- GI/Abdominal Exam


Additional comments: 





Deferred





Assessment and Plan





- Assessment and Plan (Free Text)


Assessment: 





Prerenal azotemia is resolved & creat remains normal


Hyper natremia is also corrected.


Plan: 





Will see pt as needed. Please call if renal evaluation is needed

## 2018-06-14 VITALS — RESPIRATION RATE: 20 BRPM

## 2018-06-14 LAB
ALBUMIN SERPL-MCNC: 2.1 G/DL (ref 3.5–5)
ALBUMIN/GLOB SERPL: 0.9 {RATIO} (ref 1–2.1)
ALT SERPL-CCNC: 40 U/L (ref 21–72)
AST SERPL-CCNC: 27 U/L (ref 17–59)
BASOPHILS # BLD AUTO: 0 K/UL (ref 0–0.2)
BASOPHILS NFR BLD: 0.3 % (ref 0–2)
BILIRUB UR-MCNC: NEGATIVE MG/DL
BUN SERPL-MCNC: 23 MG/DL (ref 9–20)
CALCIUM SERPL-MCNC: 7.3 MG/DL (ref 8.6–10.4)
EOSINOPHIL # BLD AUTO: 0.1 K/UL (ref 0–0.7)
EOSINOPHIL NFR BLD: 1.3 % (ref 0–4)
EOSINOPHIL NFR BLD: 3 % (ref 0–4)
ERYTHROCYTE [DISTWIDTH] IN BLOOD BY AUTOMATED COUNT: 15.3 % (ref 11.5–14.5)
GFR NON-AFRICAN AMERICAN: 60
GLUCOSE UR STRIP-MCNC: NORMAL MG/DL
HGB BLD-MCNC: 10.3 G/DL (ref 12–18)
LEUKOCYTE ESTERASE UR-ACNC: (no result) LEU/UL
LYMPHOCYTE: 8 % (ref 20–40)
LYMPHOCYTES # BLD AUTO: 0.8 K/UL (ref 1–4.3)
LYMPHOCYTES NFR BLD AUTO: 7.9 % (ref 20–40)
MCH RBC QN AUTO: 28.4 PG (ref 27–31)
MCHC RBC AUTO-ENTMCNC: 32.7 G/DL (ref 33–37)
MCV RBC AUTO: 87.1 FL (ref 80–94)
MONOCYTE: 2 % (ref 0–10)
MONOCYTES # BLD: 0.4 K/UL (ref 0–0.8)
MONOCYTES NFR BLD: 4.3 % (ref 0–10)
NEUTROPHILS # BLD: 8.5 K/UL (ref 1.8–7)
NEUTROPHILS NFR BLD AUTO: 86 % (ref 50–75)
NEUTROPHILS NFR BLD AUTO: 86.2 % (ref 50–75)
NEUTS BAND NFR BLD: 1 % (ref 0–2)
NRBC BLD AUTO-RTO: 0.1 % (ref 0–2)
PH UR STRIP: 5 [PH] (ref 5–8)
PLATELET # BLD EST: NORMAL 10*3/UL
PLATELET # BLD: 311 K/UL (ref 130–400)
PMV BLD AUTO: 9.4 FL (ref 7.2–11.7)
PROT UR STRIP-MCNC: NEGATIVE MG/DL
RBC # BLD AUTO: 3.62 MIL/UL (ref 4.4–5.9)
RBC # UR STRIP: NEGATIVE /UL
SP GR UR STRIP: 1.01 (ref 1–1.03)
SQUAMOUS EPITHIAL: < 1 /HPF (ref 0–5)
TOTAL CELLS COUNTED BLD: 100
UROBILINOGEN UR-MCNC: NORMAL MG/DL (ref 0.2–1)
WBC # BLD AUTO: 9.9 K/UL (ref 4.8–10.8)

## 2018-06-14 RX ADMIN — HYDROMORPHONE HYDROCHLORIDE PRN MG: 1 INJECTION, SOLUTION INTRAMUSCULAR; INTRAVENOUS; SUBCUTANEOUS at 10:38

## 2018-06-14 RX ADMIN — HYDROMORPHONE HYDROCHLORIDE PRN MG: 1 INJECTION, SOLUTION INTRAMUSCULAR; INTRAVENOUS; SUBCUTANEOUS at 17:46

## 2018-06-14 RX ADMIN — HYDROMORPHONE HYDROCHLORIDE PRN MG: 1 INJECTION, SOLUTION INTRAMUSCULAR; INTRAVENOUS; SUBCUTANEOUS at 14:17

## 2018-06-14 RX ADMIN — WATER SCH MLS/HR: 1 INJECTION INTRAMUSCULAR; INTRAVENOUS; SUBCUTANEOUS at 06:30

## 2018-06-14 RX ADMIN — WATER SCH MLS/HR: 1 INJECTION INTRAMUSCULAR; INTRAVENOUS; SUBCUTANEOUS at 15:08

## 2018-06-14 RX ADMIN — HYDROMORPHONE HYDROCHLORIDE PRN MG: 1 INJECTION, SOLUTION INTRAMUSCULAR; INTRAVENOUS; SUBCUTANEOUS at 21:55

## 2018-06-14 RX ADMIN — SODIUM CHLORIDE SCH MLS/HR: 9 INJECTION, SOLUTION INTRAVENOUS at 16:13

## 2018-06-14 RX ADMIN — HYDROMORPHONE HYDROCHLORIDE PRN MG: 1 INJECTION, SOLUTION INTRAMUSCULAR; INTRAVENOUS; SUBCUTANEOUS at 00:17

## 2018-06-14 RX ADMIN — I.V. FAT EMULSION SCH: 20 EMULSION INTRAVENOUS at 20:12

## 2018-06-14 RX ADMIN — SODIUM CHLORIDE SCH MLS/HR: 9 INJECTION, SOLUTION INTRAVENOUS at 11:22

## 2018-06-14 RX ADMIN — DIPHENHYDRAMINE HYDROCHLORIDE PRN MG: 50 INJECTION INTRAMUSCULAR; INTRAVENOUS at 21:55

## 2018-06-14 RX ADMIN — SODIUM CHLORIDE SCH MLS/HR: 9 INJECTION, SOLUTION INTRAVENOUS at 22:03

## 2018-06-14 RX ADMIN — SODIUM CHLORIDE SCH MLS/HR: 9 INJECTION, SOLUTION INTRAVENOUS at 03:35

## 2018-06-14 RX ADMIN — WATER SCH MLS/HR: 1 INJECTION INTRAMUSCULAR; INTRAVENOUS; SUBCUTANEOUS at 00:03

## 2018-06-14 NOTE — RAD
HISTORY:

Postoperative ileus.



COMPARISON:

No prior.



FINDINGS:



BOWEL:

No evidence of obstruction or free air. Nasogastric tube identified 

in the stomach. 



BONES:

Normal.



OTHER FINDINGS:

None.



IMPRESSION:

Satisfactory postoperative status.

## 2018-06-15 LAB
ALBUMIN SERPL-MCNC: 2.2 G/DL (ref 3.5–5)
ALBUMIN/GLOB SERPL: 0.9 {RATIO} (ref 1–2.1)
ALT SERPL-CCNC: 44 U/L (ref 21–72)
AST SERPL-CCNC: 31 U/L (ref 17–59)
BASOPHILS # BLD AUTO: 0 K/UL (ref 0–0.2)
BASOPHILS NFR BLD: 0.4 % (ref 0–2)
BUN SERPL-MCNC: 20 MG/DL (ref 9–20)
CALCIUM SERPL-MCNC: 7.3 MG/DL (ref 8.6–10.4)
EOSINOPHIL # BLD AUTO: 0.2 K/UL (ref 0–0.7)
EOSINOPHIL NFR BLD: 4.1 % (ref 0–4)
ERYTHROCYTE [DISTWIDTH] IN BLOOD BY AUTOMATED COUNT: 15 % (ref 11.5–14.5)
GFR NON-AFRICAN AMERICAN: > 60
HGB BLD-MCNC: 10 G/DL (ref 12–18)
LYMPHOCYTES # BLD AUTO: 0.7 K/UL (ref 1–4.3)
LYMPHOCYTES NFR BLD AUTO: 12 % (ref 20–40)
MCH RBC QN AUTO: 28.8 PG (ref 27–31)
MCHC RBC AUTO-ENTMCNC: 33.2 G/DL (ref 33–37)
MCV RBC AUTO: 86.5 FL (ref 80–94)
MONOCYTES # BLD: 0.3 K/UL (ref 0–0.8)
MONOCYTES NFR BLD: 5.5 % (ref 0–10)
NEUTROPHILS # BLD: 4.6 K/UL (ref 1.8–7)
NEUTROPHILS NFR BLD AUTO: 78 % (ref 50–75)
NRBC BLD AUTO-RTO: 0 % (ref 0–2)
PLATELET # BLD: 336 K/UL (ref 130–400)
PMV BLD AUTO: 9.4 FL (ref 7.2–11.7)
RBC # BLD AUTO: 3.48 MIL/UL (ref 4.4–5.9)
WBC # BLD AUTO: 5.9 K/UL (ref 4.8–10.8)

## 2018-06-15 RX ADMIN — SODIUM CHLORIDE SCH MLS/HR: 9 INJECTION, SOLUTION INTRAVENOUS at 11:54

## 2018-06-15 RX ADMIN — POTASSIUM CHLORIDE SCH MLS/HR: 2 INJECTION, SOLUTION, CONCENTRATE INTRAVENOUS at 10:54

## 2018-06-15 RX ADMIN — WATER SCH MLS/HR: 1 INJECTION INTRAMUSCULAR; INTRAVENOUS; SUBCUTANEOUS at 22:26

## 2018-06-15 RX ADMIN — HYDROMORPHONE HYDROCHLORIDE PRN MG: 1 INJECTION, SOLUTION INTRAMUSCULAR; INTRAVENOUS; SUBCUTANEOUS at 02:05

## 2018-06-15 RX ADMIN — DIPHENHYDRAMINE HYDROCHLORIDE PRN MG: 50 INJECTION INTRAMUSCULAR; INTRAVENOUS at 22:20

## 2018-06-15 RX ADMIN — HYDROMORPHONE HYDROCHLORIDE PRN MG: 1 INJECTION, SOLUTION INTRAMUSCULAR; INTRAVENOUS; SUBCUTANEOUS at 14:41

## 2018-06-15 RX ADMIN — WATER SCH MLS/HR: 1 INJECTION INTRAMUSCULAR; INTRAVENOUS; SUBCUTANEOUS at 00:00

## 2018-06-15 RX ADMIN — POTASSIUM CHLORIDE SCH: 2 INJECTION, SOLUTION, CONCENTRATE INTRAVENOUS at 05:55

## 2018-06-15 RX ADMIN — HYDROMORPHONE HYDROCHLORIDE PRN MG: 1 INJECTION, SOLUTION INTRAMUSCULAR; INTRAVENOUS; SUBCUTANEOUS at 18:49

## 2018-06-15 RX ADMIN — WATER SCH MLS/HR: 1 INJECTION INTRAMUSCULAR; INTRAVENOUS; SUBCUTANEOUS at 06:50

## 2018-06-15 RX ADMIN — HYDROMORPHONE HYDROCHLORIDE PRN MG: 1 INJECTION, SOLUTION INTRAMUSCULAR; INTRAVENOUS; SUBCUTANEOUS at 10:59

## 2018-06-15 RX ADMIN — SODIUM CHLORIDE SCH MLS/HR: 9 INJECTION, SOLUTION INTRAVENOUS at 04:30

## 2018-06-15 RX ADMIN — HYDROMORPHONE HYDROCHLORIDE PRN MG: 1 INJECTION, SOLUTION INTRAMUSCULAR; INTRAVENOUS; SUBCUTANEOUS at 21:34

## 2018-06-15 RX ADMIN — SODIUM CHLORIDE SCH MLS/HR: 9 INJECTION, SOLUTION INTRAVENOUS at 16:45

## 2018-06-15 RX ADMIN — WATER SCH MLS/HR: 1 INJECTION INTRAMUSCULAR; INTRAVENOUS; SUBCUTANEOUS at 14:42

## 2018-06-15 RX ADMIN — SODIUM CHLORIDE SCH MLS/HR: 9 INJECTION, SOLUTION INTRAVENOUS at 21:33

## 2018-06-15 NOTE — CP.PCM.PN
Subjective





- Date & Time of Evaluation


Date of Evaluation: 06/15/18


Time of Evaluation: 07:25





- Subjective


Subjective: 





Surgery 





Pt seen and examined. No acute events. Pain controlled. Received 1 dose of 

dilaudid 0.5mg overnight. Denies fever, nausea. NGT in place. AMbulates with 

help. VOiding without difficulty. Denies flatus, BM. Dressing changed this AM . 





Objective





- Vital Signs/Intake and Output


Vital Signs (last 24 hours): 


 











Temp Pulse Resp BP Pulse Ox


 


 98.1 F   84   20   130/77   96 


 


 06/15/18 04:20  06/15/18 04:20  06/15/18 04:20  06/15/18 04:20  06/15/18 04:20








Intake and Output: 


 











 06/15/18 06/15/18





 06:59 18:59


 


Intake Total 2060 


 


Output Total 1365 


 


Balance 695 














- Medications


Medications: 


 Current Medications





Albuterol/Ipratropium (Duoneb 3 Mg/0.5 Mg (3 Ml) Ud)  3 ml INH RQ4 PRN


   PRN Reason: Shortness of Breath


Benzocaine/Menthol (Cepacol Sore Throat)  1 emmanuel MT Q4 PRN


   PRN Reason: Sore Throat


   Last Admin: 06/06/18 13:06 Dose:  1 emmanuel


Diphenhydramine HCl (Benadryl)  50 mg IVP HS PRN


   PRN Reason: Insomnia


   Last Admin: 06/14/18 21:55 Dose:  50 mg


Heparin Sodium (Porcine) (Heparin)  5,000 units SC Q8 KATIE


   Last Admin: 06/15/18 05:54 Dose:  5,000 units


Hydromorphone HCl (Dilaudid)  0.5 mg IVP Q2 PRN


   PRN Reason: Pain, severe (8-10)


   Last Admin: 06/15/18 02:05 Dose:  0.5 mg


Piperacillin Sod/Tazobactam (Sod 3.375 gm/ Sodium Chloride)  100 mls @ 200 mls/

hr IVPB Q6H KATIE


   PRN Reason: Protocol


   Last Admin: 06/15/18 04:30 Dose:  200 mls/hr


BUPIVACAINE 0.125%/0.9% NACL (Bupivacaine-Ns 0.125% On-Q )  600 mls @ 4 mls/

hr IJ ONCE ONE


   Stop: 06/17/18 20:59


Metronidazole (Flagyl)  500 mg in 100 mls @ 100 mls/hr IVPB Q8H KATIE


   PRN Reason: Protocol


   Last Admin: 06/15/18 06:50 Dose:  100 mls/hr


BUPIVACAINE 0.125%/0.9% NACL (Bupivacaine-Ns 0.125% On-Q )  600 mls @ 4 mls/

hr IJ ONCE ONE


   Stop: 06/19/18 23:16


   Last Admin: 06/13/18 22:47 Dose:  4 mls/hr


Lactated Ringer's (Lactated Ringer's)  1,000 mls @ 20 mls/hr IV .Q24H [facility]


   Last Admin: 06/14/18 08:27 Dose:  Not Given


Sodium Chloride 15 meq/Potassium Phosphate 15 mmole/Magnesium Sulfate 3 meq/

Calcium Gluconate 4.6 meq/Heparin Sodium (Porcine) 1,000 units/ Chromium/Copper/

Manganese/Zinc 1 ml/ Amino Acids  1,021.3813 mls @ 80 mls/hr IV .[id number] [facility]


   Stop: 06/15/18 17:59


   Last Admin: 06/15/18 05:55 Dose:  Not Given


Ketorolac Tromethamine (Toradol)  15 mg IVP Q6 PRN


   PRN Reason: Pain, moderate (4-7)


Lidocaine (Lidoderm)  1 ea TD DAILY@0800 [facility]


   Last Admin: 06/14/18 08:34 Dose:  1 ea


Lidocaine (Lidoderm)  1 ea TD DAILY [facility]


   Last Admin: 06/14/18 17:59 Dose:  1 ea


Metoclopramide HCl (Reglan)  5 mg IVP Q6 [facility]


   Last Admin: 06/15/18 05:50 Dose:  5 mg


Ondansetron HCl (Zofran Inj)  4 mg IV Q4 PRN


   PRN Reason: Nausea/Vomiting


   Last Admin: 06/12/18 09:24 Dose:  4 mg


Pantoprazole Sodium (Protonix Inj)  40 mg IVP DAILY [facility]


   Last Admin: 06/14/18 11:21 Dose:  40 mg











- Labs


Labs: 


 





 06/14/18 07:09 





 06/14/18 07:09 





 











PT  12.0 SECONDS (9.7-12.2)   06/11/18  06:15    


 


INR  1.1   06/11/18  06:15    


 


APTT  24 SECONDS (21-34)  D 06/11/18  13:52    














- Constitutional


Appears: No Acute Distress





- Head Exam


Head Exam: ATRAUMATIC, NORMAL INSPECTION, NORMOCEPHALIC





- Eye Exam


Eye Exam: EOMI, Normal appearance, PERRL


Pupil Exam: NORMAL ACCOMODATION, PERRL





- ENT Exam


ENT Exam: Mucous Membranes Moist, Normal Exam


Additional comments: 





NGT in place: 100cc overnight bilious 





- Neck Exam


Neck Exam: Full ROM, Normal Inspection.  absent: Lymphadenopathy





- Respiratory Exam


Respiratory Exam: Clear to Ausculation Bilateral, NORMAL BREATHING PATTERN





- Cardiovascular Exam


Cardiovascular Exam: REGULAR RHYTHM, +S1, +S2.  absent: Murmur





- GI/Abdominal Exam


GI & Abdominal Exam: Soft, Tenderness, Normal Bowel Sounds.  absent: Guarding, 

Rigid, Mass


Additional comments: 





full. Incision open. packed with wed gauze. Drain 20cc SS overnight. 





-  Exam


 Exam: NORMAL INSPECTION





- Extremities Exam


Extremities Exam: Full ROM, Normal Capillary Refill, Normal Inspection.  absent

: Joint Swelling, Pedal Edema





- Back Exam


Back Exam: NORMAL INSPECTION





- Neurological Exam


Neurological Exam: Alert, Awake, CN II-XII Intact, Normal Gait, Oriented x3





- Psychiatric Exam


Psychiatric exam: Normal Affect, Normal Mood





- Skin


Skin Exam: Normal Color, Warm





Assessment and Plan





- Assessment and Plan (Free Text)


Assessment: 





POD 15 colectomy


POD 4 exploratory laparotomy , colectomy


Drain 20cc ss overnight, 50cc ss/24hrs


NGT: 50cc biliuous overnight, 200cc/24hrs 


Urine 100cc/hr 





-Monitor Bowel function 


-Total fluids at 100cc/hr


-TPN 


-ABX


-DVT/ GI ppx


-pain control 


-Replete electrolyte PRN 


-IS/ OOB / ambulate 





will JACKIE Villalobos

## 2018-06-16 LAB
ALBUMIN SERPL-MCNC: 2.2 G/DL (ref 3.5–5)
ALBUMIN/GLOB SERPL: 0.9 {RATIO} (ref 1–2.1)
ALT SERPL-CCNC: 43 U/L (ref 21–72)
AST SERPL-CCNC: 40 U/L (ref 17–59)
BASOPHILS # BLD AUTO: 0 K/UL (ref 0–0.2)
BASOPHILS NFR BLD: 0.4 % (ref 0–2)
BUN SERPL-MCNC: 17 MG/DL (ref 9–20)
CALCIUM SERPL-MCNC: 7.5 MG/DL (ref 8.6–10.4)
EOSINOPHIL # BLD AUTO: 0.2 K/UL (ref 0–0.7)
EOSINOPHIL NFR BLD: 3.2 % (ref 0–4)
ERYTHROCYTE [DISTWIDTH] IN BLOOD BY AUTOMATED COUNT: 14.8 % (ref 11.5–14.5)
GFR NON-AFRICAN AMERICAN: > 60
HGB BLD-MCNC: 10.1 G/DL (ref 12–18)
LYMPHOCYTES # BLD AUTO: 0.7 K/UL (ref 1–4.3)
LYMPHOCYTES NFR BLD AUTO: 10.6 % (ref 20–40)
MCH RBC QN AUTO: 28.8 PG (ref 27–31)
MCHC RBC AUTO-ENTMCNC: 33.4 G/DL (ref 33–37)
MCV RBC AUTO: 86.4 FL (ref 80–94)
MONOCYTES # BLD: 0.4 K/UL (ref 0–0.8)
MONOCYTES NFR BLD: 6.4 % (ref 0–10)
NEUTROPHILS # BLD: 5 K/UL (ref 1.8–7)
NEUTROPHILS NFR BLD AUTO: 79.4 % (ref 50–75)
NRBC BLD AUTO-RTO: 0 % (ref 0–2)
PLATELET # BLD: 358 K/UL (ref 130–400)
PMV BLD AUTO: 9.4 FL (ref 7.2–11.7)
RBC # BLD AUTO: 3.51 MIL/UL (ref 4.4–5.9)
WBC # BLD AUTO: 6.3 K/UL (ref 4.8–10.8)

## 2018-06-16 RX ADMIN — HYDROMORPHONE HYDROCHLORIDE PRN MG: 1 INJECTION, SOLUTION INTRAMUSCULAR; INTRAVENOUS; SUBCUTANEOUS at 19:05

## 2018-06-16 RX ADMIN — MAGNESIUM SULFATE IN DEXTROSE SCH MLS/HR: 10 INJECTION, SOLUTION INTRAVENOUS at 10:34

## 2018-06-16 RX ADMIN — HYDROMORPHONE HYDROCHLORIDE PRN MG: 1 INJECTION, SOLUTION INTRAMUSCULAR; INTRAVENOUS; SUBCUTANEOUS at 04:48

## 2018-06-16 RX ADMIN — SODIUM CHLORIDE SCH MLS/HR: 9 INJECTION, SOLUTION INTRAVENOUS at 04:52

## 2018-06-16 RX ADMIN — MAGNESIUM SULFATE IN DEXTROSE SCH MLS/HR: 10 INJECTION, SOLUTION INTRAVENOUS at 12:41

## 2018-06-16 RX ADMIN — HYDROMORPHONE HYDROCHLORIDE PRN MG: 1 INJECTION, SOLUTION INTRAMUSCULAR; INTRAVENOUS; SUBCUTANEOUS at 14:14

## 2018-06-16 RX ADMIN — HYDROMORPHONE HYDROCHLORIDE PRN MG: 1 INJECTION, SOLUTION INTRAMUSCULAR; INTRAVENOUS; SUBCUTANEOUS at 00:18

## 2018-06-16 RX ADMIN — DIPHENHYDRAMINE HYDROCHLORIDE PRN MG: 50 INJECTION INTRAMUSCULAR; INTRAVENOUS at 21:32

## 2018-06-16 RX ADMIN — WATER SCH MLS/HR: 1 INJECTION INTRAMUSCULAR; INTRAVENOUS; SUBCUTANEOUS at 06:35

## 2018-06-16 RX ADMIN — HYDROMORPHONE HYDROCHLORIDE PRN MG: 1 INJECTION, SOLUTION INTRAMUSCULAR; INTRAVENOUS; SUBCUTANEOUS at 09:40

## 2018-06-16 NOTE — CP.PCM.PN
<Mohit Velasco - Last Filed: 06/16/18 08:33>





Subjective





- Date & Time of Evaluation


Date of Evaluation: 06/16/18


Time of Evaluation: 08:18





- Subjective


Subjective: 


Surgery 





Pt seen and examined. No acute events. Pt had multiple BM yesterday. Reports 

passing flatus. Denies nausea, vomiting, CP, SOB. Ambulated multiple times 

yesterday. OnQ pump DCed. Dressing changed this AM. 





Objective





- Vital Signs/Intake and Output


Vital Signs (last 24 hours): 


 











Temp Pulse Resp BP Pulse Ox


 


 97.7 F   85   20   139/76   97 


 


 06/16/18 07:05  06/16/18 07:05  06/16/18 07:05  06/16/18 07:05  06/16/18 07:05








Intake and Output: 


 











 06/16/18 06/16/18





 06:59 18:59


 


Intake Total 1060 


 


Output Total 1140 


 


Balance -80 














- Medications


Medications: 


 Current Medications





Albuterol/Ipratropium (Duoneb 3 Mg/0.5 Mg (3 Ml) Ud)  3 ml INH RQ4 PRN


   PRN Reason: Shortness of Breath


Benzocaine/Menthol (Cepacol Sore Throat)  1 emmanuel MT Q4 PRN


   PRN Reason: Sore Throat


   Last Admin: 06/06/18 13:06 Dose:  1 emmanuel


Diphenhydramine HCl (Benadryl)  50 mg IVP HS PRN


   PRN Reason: Insomnia


   Last Admin: 06/15/18 22:20 Dose:  50 mg


Furosemide (Lasix)  5 mg IVP STAT STA


   Stop: 06/16/18 08:09


Heparin Sodium (Porcine) (Heparin)  5,000 units SC Q8 KATIE


   Last Admin: 06/16/18 06:13 Dose:  5,000 units


Hydromorphone HCl (Dilaudid)  0.5 mg IVP Q2 PRN


   PRN Reason: Pain, severe (8-10)


   Last Admin: 06/16/18 04:48 Dose:  0.5 mg


BUPIVACAINE 0.125%/0.9% NACL (Bupivacaine-Ns 0.125% On-Q )  600 mls @ 4 mls/

hr IJ ONCE ONE


   Stop: 06/17/18 20:59


Metronidazole (Flagyl)  500 mg in 100 mls @ 100 mls/hr IVPB Q8H KATIE


   PRN Reason: Protocol


   Last Admin: 06/16/18 06:35 Dose:  100 mls/hr


BUPIVACAINE 0.125%/0.9% NACL (Bupivacaine-Ns 0.125% On-Q )  600 mls @ 4 mls/

hr IJ ONCE ONE


   Stop: 06/19/18 23:16


   Last Admin: 06/13/18 22:47 Dose:  4 mls/hr


Sodium Chloride 15 meq/Potassium Phosphate 15 mmole/Magnesium Sulfate 3 meq/

Calcium Gluconate 4.6 meq/Heparin Sodium (Porcine) 1,000 units/ Chromium/Copper/

Manganese/Zinc 1 ml/ Potassium Chloride 40 meq/ Amino Acids  1,041.3813 mls @ 

80 mls/hr IV .Q13H2M Replaced by [facility]


   Stop: 06/16/18 18:00


   Last Admin: 06/16/18 06:36 Dose:  80 mls/hr


Piperacillin Sod/Tazobactam (Sod 3.375 gm/ Dextrose)  50 mls @ 200 mls/hr IVPB 

Q6H Replaced by [facility]


   PRN Reason: Protocol


Ketorolac Tromethamine (Toradol)  15 mg IVP Q6 PRN


   PRN Reason: Pain, moderate (4-7)


   Last Admin: 06/15/18 16:45 Dose:  15 mg


Ketorolac Tromethamine (Toradol)  15 mg IVP Q6 Replaced by [facility]


Lidocaine (Lidoderm)  1 ea TD DAILY@0800 Replaced by [facility]


   Last Admin: 06/15/18 08:22 Dose:  1 ea


Lidocaine (Lidoderm)  1 ea TD DAILY Replaced by [facility]


   Last Admin: 06/15/18 09:13 Dose:  1 ea


Metoclopramide HCl (Reglan)  5 mg IVP Q6 Replaced by [facility]


   Last Admin: 06/16/18 06:13 Dose:  5 mg


Ondansetron HCl (Zofran Inj)  4 mg IV Q4 PRN


   PRN Reason: Nausea/Vomiting


   Last Admin: 06/12/18 09:24 Dose:  4 mg


Pantoprazole Sodium (Protonix Inj)  40 mg IVP DAILY Replaced by [facility]


   Last Admin: 06/15/18 09:14 Dose:  40 mg











- Labs


Labs: 


 





 06/16/18 07:21 





 06/16/18 07:21 





 











PT  12.0 SECONDS (9.7-12.2)   06/11/18  06:15    


 


INR  1.1   06/11/18  06:15    


 


APTT  24 SECONDS (21-34)  D 06/11/18  13:52    














- Constitutional


Appears: No Acute Distress





- Head Exam


Head Exam: ATRAUMATIC, NORMAL INSPECTION, NORMOCEPHALIC





- Eye Exam


Eye Exam: EOMI, Normal appearance, PERRL


Pupil Exam: NORMAL ACCOMODATION, PERRL





- ENT Exam


ENT Exam: Mucous Membranes Moist, Normal Exam


Additional comments: 





NGT clamped. 





- Neck Exam


Neck Exam: Full ROM, Normal Inspection.  absent: Lymphadenopathy





- Respiratory Exam


Respiratory Exam: Clear to Ausculation Bilateral, NORMAL BREATHING PATTERN





- Cardiovascular Exam


Cardiovascular Exam: REGULAR RHYTHM, +S1, +S2.  absent: Murmur





- GI/Abdominal Exam


GI & Abdominal Exam: Soft, Normal Bowel Sounds.  absent: Distended, Firm, 

Guarding, Rigid, Tenderness


Additional comments: 





wet to dry dressing in place. dold Drain  





-  Exam


 Exam: NORMAL INSPECTION





- Extremities Exam


Extremities Exam: Full ROM, Normal Capillary Refill, Normal Inspection.  absent

: Joint Swelling, Pedal Edema





- Back Exam


Back Exam: NORMAL INSPECTION





- Neurological Exam


Neurological Exam: Alert, Awake, CN II-XII Intact, Normal Gait, Oriented x3





- Psychiatric Exam


Psychiatric exam: Normal Affect, Normal Mood





- Skin


Skin Exam: Normal Color, Warm





Assessment and Plan





- Assessment and Plan (Free Text)


Assessment: 








POD 16 colectomy


POD 5 exploratory laparotomy , colectomy


Drain 15cc ss overnight, 90cc ss/24hrs


NGT: 0cc biliuous overnight 


Urine 100cc/hr 





-DC NGT


-CLD


-TPN 


-ABX


-DVT/ GI ppx


-pain control 


-Replete electrolyte PRN 


-IS/ OOB / ambulate 





DW Dr. Villalobos 





<Jas Villalobos - Last Filed: 06/16/18 11:00>





Objective





- Vital Signs/Intake and Output


Vital Signs (last 24 hours): 


 











Temp Pulse Resp BP Pulse Ox


 


 97.7 F   86   20   119/74   97 


 


 06/16/18 07:05  06/16/18 09:25  06/16/18 07:05  06/16/18 09:25  06/16/18 07:05








Intake and Output: 


 











 06/16/18 06/16/18





 06:59 18:59


 


Intake Total 1060 


 


Output Total 1140 


 


Balance -80 














- Medications


Medications: 


 Current Medications





Albuterol/Ipratropium (Duoneb 3 Mg/0.5 Mg (3 Ml) Ud)  3 ml INH RQ4 PRN


   PRN Reason: Shortness of Breath


Benzocaine/Menthol (Cepacol Sore Throat)  1 emmanuel MT Q4 PRN


   PRN Reason: Sore Throat


   Last Admin: 06/06/18 13:06 Dose:  1 emmanuel


Diphenhydramine HCl (Benadryl)  50 mg IVP HS PRN


   PRN Reason: Insomnia


   Last Admin: 06/15/18 22:20 Dose:  50 mg


Heparin Sodium (Porcine) (Heparin)  5,000 units SC Q8 KATIE


   Last Admin: 06/16/18 06:13 Dose:  5,000 units


Hydromorphone HCl (Dilaudid)  0.5 mg IVP Q2 PRN


   PRN Reason: Pain, severe (8-10)


   Last Admin: 06/16/18 09:40 Dose:  0.5 mg


BUPIVACAINE 0.125%/0.9% NACL (Bupivacaine-Ns 0.125% On-Q )  600 mls @ 4 mls/

hr IJ ONCE ONE


   Stop: 06/17/18 20:59


Metronidazole (Flagyl)  500 mg in 100 mls @ 100 mls/hr IVPB Q8H KATIE


   PRN Reason: Protocol


   Last Admin: 06/16/18 06:35 Dose:  100 mls/hr


BUPIVACAINE 0.125%/0.9% NACL (Bupivacaine-Ns 0.125% On-Q )  600 mls @ 4 mls/

hr IJ ONCE ONE


   Stop: 06/19/18 23:16


   Last Admin: 06/13/18 22:47 Dose:  4 mls/hr


Sodium Chloride 15 meq/Potassium Phosphate 15 mmole/Magnesium Sulfate 3 meq/

Calcium Gluconate 4.6 meq/Heparin Sodium (Porcine) 1,000 units/ Chromium/Copper/

Manganese/Zinc 1 ml/ Potassium Chloride 40 meq/ Amino Acids  1,041.3813 mls @ 

80 mls/hr IV .Q13H2M Replaced by [facility]


   Stop: 06/16/18 18:00


   Last Admin: 06/16/18 06:36 Dose:  80 mls/hr


Piperacillin Sod/Tazobactam (Sod 3.375 gm/ Dextrose)  50 mls @ 200 mls/hr IVPB 

Q6H KATIE


   PRN Reason: Protocol


   Last Admin: 06/16/18 09:40 Dose:  200 mls/hr


Potassium Phosphate 15 mmole/ (Dextrose)  255 mls @ 42.5 mls/hr IVPB ONCE ONE


   Stop: 06/16/18 21:59


Ketorolac Tromethamine (Toradol)  15 mg IVP Q6 PRN


   PRN Reason: Pain, moderate (4-7)


   Last Admin: 06/15/18 16:45 Dose:  15 mg


Ketorolac Tromethamine (Toradol)  15 mg IVP Q6 Replaced by [facility]


Lidocaine (Lidoderm)  1 ea TD DAILY@0800 KATIE


   Last Admin: 06/16/18 08:38 Dose:  1 ea


Lidocaine (Lidoderm)  1 ea TD DAILY Replaced by [facility]


   Last Admin: 06/16/18 09:31 Dose:  1 ea


Metoclopramide HCl (Reglan)  5 mg IVP Q6 Replaced by [facility]


   Last Admin: 06/16/18 06:13 Dose:  5 mg


Ondansetron HCl (Zofran Inj)  4 mg IV Q4 PRN


   PRN Reason: Nausea/Vomiting


   Last Admin: 06/12/18 09:24 Dose:  4 mg


Pantoprazole Sodium (Protonix Inj)  40 mg IVP DAILY Replaced by [facility]


   Last Admin: 06/16/18 09:25 Dose:  40 mg











- Labs


Labs: 


 





 06/16/18 07:21 





 06/16/18 07:21 





 











PT  12.0 SECONDS (9.7-12.2)   06/11/18  06:15    


 


INR  1.1   06/11/18  06:15    


 


APTT  24 SECONDS (21-34)  D 06/11/18  13:52    














Assessment and Plan





- Assessment and Plan (Free Text)


Plan: 


Patient seen and examined at bedside independent of resident staf. Agree with 

above assessment and plan. NGt min output after overnight clamp trial. Passing 

flatus and BM's. No nausea. Pain controlled.  Not really asking for much IV 

meds. Abd soft, full, dressings clean. KENDRA drain serous. Will d.c NGT and do 

trial so clears. Will progress diet slowly day to day. Diurese today- lasix +

albumin. Goal net negative 1L in 24 hours. Cont. TPN only, and Stop MIVF. OOB 

and ambulate in [place]. SCD at all times when in bed or chair. HSQ and PPI. Will 

discontinue Abx. Dispo plan: tentatively home Monday or Tuesday. Plan discussed 

with patient and wife at bedside and patient's son over the phone at patient's 

request.

## 2018-06-17 LAB
BUN SERPL-MCNC: 13 MG/DL (ref 9–20)
CALCIUM SERPL-MCNC: 7.5 MG/DL (ref 8.6–10.4)
GFR NON-AFRICAN AMERICAN: > 60

## 2018-06-17 RX ADMIN — DIPHENHYDRAMINE HYDROCHLORIDE PRN MG: 50 INJECTION INTRAMUSCULAR; INTRAVENOUS at 22:53

## 2018-06-17 RX ADMIN — HYDROMORPHONE HYDROCHLORIDE PRN MG: 1 INJECTION, SOLUTION INTRAMUSCULAR; INTRAVENOUS; SUBCUTANEOUS at 02:11

## 2018-06-17 RX ADMIN — OXYCODONE HYDROCHLORIDE AND ACETAMINOPHEN PRN TAB: 5; 325 TABLET ORAL at 21:56

## 2018-06-17 RX ADMIN — PANTOPRAZOLE SODIUM SCH MG: 40 GRANULE, DELAYED RELEASE ORAL at 05:49

## 2018-06-17 NOTE — CP.PCM.PN
Subjective





- Date & Time of Evaluation


Date of Evaluation: 06/17/18


Time of Evaluation: 07:00





- Subjective


Subjective: 


General Surgery Progress Note for Dr. Villalobos








This 72M was seen and evaluated this AM at bedside. No acute events overnight. 

PT reports that he is ambulating, tolerating clear liquids denies nause or 

vomiting. He reports that he has liquid bowel movements overnight and more 

formed BM this morning. He reports that his pain is well controlled with the 

medication. Dressing and packing was changed this AM, and SCDs were replaced. 

Nurse reports that since the patient has less IV lines connected he is more 

ambulatory, he has also been voiding in the restroom without recording outputs. 





Objective





- Vital Signs/Intake and Output


Vital Signs (last 24 hours): 


 











Temp Pulse Resp BP Pulse Ox


 


 98.1 F   76   20   117/77   96 


 


 06/17/18 08:10  06/17/18 08:10  06/17/18 08:10  06/17/18 08:10  06/17/18 08:10








Intake and Output: 


 











 06/17/18 06/17/18





 06:59 18:59


 


Intake Total 1824 


 


Output Total 962 


 


Balance 862 














- Medications


Medications: 


 Current Medications





Albuterol/Ipratropium (Duoneb 3 Mg/0.5 Mg (3 Ml) Ud)  3 ml INH RQ4 PRN


   PRN Reason: Shortness of Breath


Benzocaine/Menthol (Cepacol Sore Throat)  1 emmanuel MT Q4 PRN


   PRN Reason: Sore Throat


   Last Admin: 06/06/18 13:06 Dose:  1 emmanuel


Diphenhydramine HCl (Benadryl)  50 mg IVP HS PRN


   PRN Reason: Insomnia


   Last Admin: 06/16/18 21:32 Dose:  50 mg


Heparin Sodium (Porcine) (Heparin)  5,000 units SC Q8 KATIE


   Last Admin: 06/17/18 05:50 Dose:  5,000 units


BUPIVACAINE 0.125%/0.9% NACL (Bupivacaine-Ns 0.125% On-Q )  600 mls @ 4 mls/

hr IJ ONCE ONE


   Stop: 06/17/18 20:59


BUPIVACAINE 0.125%/0.9% NACL (Bupivacaine-Ns 0.125% On-Q )  600 mls @ 4 mls/

hr IJ ONCE ONE


   Stop: 06/19/18 23:16


   Last Admin: 06/13/18 22:47 Dose:  4 mls/hr


Sodium Chloride 15 meq/Magnesium Sulfate 6 meq/Heparin Sodium (Porcine) 1,000 

units/ Chromium/Copper/Manganese/Seleni/Zn 1 ml/Amino Acids  1,007.2278 mls @ 

45 mls/hr IV .[id number] [facility]


   Stop: 06/17/18 18:00


Fat Emulsion Intravenous (Intralipid 20%)  500 mls @ 45 mls/hr IV ONCE ONE


   Stop: 06/17/18 22:06


Potassium Phosphate 30 mmole/ (Sodium Chloride)  260 mls @ 43.333 mls/hr IV 

ONCE ONE


   Stop: 06/18/18 00:29


Lidocaine (Lidoderm)  1 ea TD DAILY@0800 [facility]


   Last Admin: 06/17/18 09:29 Dose:  1 ea


Lidocaine (Lidoderm)  1 ea TD DAILY [facility]


   Last Admin: 06/17/18 09:30 Dose:  1 ea


Metoclopramide HCl (Reglan)  5 mg IVP Q6 [facility]


   Last Admin: 06/17/18 05:50 Dose:  5 mg


Ondansetron HCl (Zofran Inj)  4 mg IV Q4 PRN


   PRN Reason: Nausea/Vomiting


   Last Admin: 06/12/18 09:24 Dose:  4 mg


Pantoprazole Sodium (Protonix Susp)  40 mg PO 0600 [facility]


   Last Admin: 06/17/18 05:49 Dose:  40 mg


Tramadol HCl (Ultram)  50 mg PO TID PRN


   PRN Reason: Pain, severe (8-10)











- Labs


Labs: 


 





 06/16/18 07:21 





 06/17/18 07:23 





 











PT  12.0 SECONDS (9.7-12.2)   06/11/18  06:15    


 


INR  1.1   06/11/18  06:15    


 


APTT  24 SECONDS (21-34)  D 06/11/18  13:52    














- Constitutional


Appears: Non-toxic, No Acute Distress





- Head Exam


Head Exam: ATRAUMATIC, NORMOCEPHALIC





- Eye Exam


Eye Exam: EOMI, Normal appearance





- ENT Exam


ENT Exam: Mucous Membranes Moist





- Respiratory Exam


Respiratory Exam: NORMAL BREATHING PATTERN.  absent: Respiratory Distress


Additional comments: 


On Room air








- Cardiovascular Exam


Cardiovascular Exam: +S1, +S2





- GI/Abdominal Exam


GI & Abdominal Exam: Soft.  absent: Distended, Firm, Guarding, Rigid, Tenderness


Additional comments: 


Midline and drain site packing changed this AM no purulence noted, packing and 

dressing with pink serous output. Tim drain  55cc serous output over 24hr. 








- Extremities Exam


Extremities Exam: Normal Inspection


Additional comments: 





No calf tendernes





- Neurological Exam


Neurological Exam: Alert, Awake





- Psychiatric Exam


Psychiatric exam: Normal Affect, Normal Mood





- Skin


Skin Exam: Normal Color





Assessment and Plan





- Assessment and Plan (Free Text)


Assessment: 


72M POD 17 S/P robotic right hemicolectomy due to incomplete excision of 

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





Tim drain 55cc/24hr


Urine output not accurately recorded


Afebrile, No Leukocytosis


K 3.7, Phos 2.4, Mg 2.1





Plan: 


- Advance to [facility]


- D/C TPN when currently running order expires 


- D/C IV Pain medication, Starting Ultram 


- Continue ABX


- Continue DVT/ GI ppx


- Replete Potassium and Phos


- IS/ OOB / ambulate 


- Discuss D/C planning with home services with case management


Further recs per Dr. Wilfredo Sigala PGY2


[phone]

## 2018-06-18 VITALS
OXYGEN SATURATION: 96 % | SYSTOLIC BLOOD PRESSURE: 124 MMHG | HEART RATE: 96 BPM | DIASTOLIC BLOOD PRESSURE: 81 MMHG | TEMPERATURE: 98.4 F

## 2018-06-18 LAB
ALBUMIN SERPL-MCNC: 2.8 G/DL (ref 3.5–5)
ALBUMIN/GLOB SERPL: 1 {RATIO} (ref 1–2.1)
ALT SERPL-CCNC: 42 U/L (ref 21–72)
AST SERPL-CCNC: 35 U/L (ref 17–59)
BASOPHILS # BLD AUTO: 0.1 K/UL (ref 0–0.2)
BASOPHILS NFR BLD: 0.6 % (ref 0–2)
BUN SERPL-MCNC: 10 MG/DL (ref 9–20)
CALCIUM SERPL-MCNC: 8.2 MG/DL (ref 8.6–10.4)
EOSINOPHIL # BLD AUTO: 0.3 K/UL (ref 0–0.7)
EOSINOPHIL NFR BLD: 2.5 % (ref 0–4)
ERYTHROCYTE [DISTWIDTH] IN BLOOD BY AUTOMATED COUNT: 14.6 % (ref 11.5–14.5)
GFR NON-AFRICAN AMERICAN: > 60
HGB BLD-MCNC: 11 G/DL (ref 12–18)
LYMPHOCYTES # BLD AUTO: 1.3 K/UL (ref 1–4.3)
LYMPHOCYTES NFR BLD AUTO: 11.4 % (ref 20–40)
MCH RBC QN AUTO: 27.8 PG (ref 27–31)
MCHC RBC AUTO-ENTMCNC: 32.6 G/DL (ref 33–37)
MCV RBC AUTO: 85.3 FL (ref 80–94)
MONOCYTES # BLD: 0.5 K/UL (ref 0–0.8)
MONOCYTES NFR BLD: 4.5 % (ref 0–10)
NEUTROPHILS # BLD: 9.3 K/UL (ref 1.8–7)
NEUTROPHILS NFR BLD AUTO: 81 % (ref 50–75)
NRBC BLD AUTO-RTO: 0 % (ref 0–2)
PLATELET # BLD: 401 K/UL (ref 130–400)
PMV BLD AUTO: 9.7 FL (ref 7.2–11.7)
RBC # BLD AUTO: 3.96 MIL/UL (ref 4.4–5.9)
WBC # BLD AUTO: 11.4 K/UL (ref 4.8–10.8)

## 2018-06-18 RX ADMIN — OXYCODONE HYDROCHLORIDE AND ACETAMINOPHEN PRN TAB: 5; 325 TABLET ORAL at 05:26

## 2018-06-18 RX ADMIN — PANTOPRAZOLE SODIUM SCH MG: 40 GRANULE, DELAYED RELEASE ORAL at 05:26

## 2018-06-18 NOTE — CP.PCM.DIS
Provider





- Provider


Date of Admission: 


05/31/18 13:28





Attending physician: 


Jas Villalobos MD





Time Spent in preparation of Discharge (in minutes): 45





Hospital Course





- Lab Results


Lab Results: 


 Micro Results





06/12/18 00:37   Blood   Blood Culture - Final


                            NO GROWTH AFTER 5 DAYS


06/12/18 00:37   Blood   Gram Stain - Final


                            TEST NOT PERFORMED


06/12/18 00:37   Blood   Blood Culture - Final


                            NO GROWTH AFTER 5 DAYS


06/12/18 00:37   Blood   Gram Stain - Final


                            TEST NOT PERFORMED


06/13/18 17:28   Nose   MRSA Culture (Admit) - Final


                            MRSA NOT DETECTED


06/11/18 22:37   Naris   MRSA Culture (Admit) - Final


                            MRSA NOT DETECTED


06/12/18 00:37   Urine,Catheterized   Urine Culture - Final


                            No Growth (<1,000 CFU/ML)


06/09/18 19:44   Abdomen   Gram Stain - Final


06/09/18 19:44   Abdomen   Wound Culture - Final


                            Escherichia Coli





 Most Recent Lab Values











WBC  11.4 K/uL (4.8-10.8)  H D 06/18/18  07:36    


 


RBC  3.96 Mil/uL (4.40-5.90)  L  06/18/18  07:36    


 


Hgb  11.0 g/dL (12.0-18.0)  L  06/18/18  07:36    


 


Hct  33.8 % (35.0-51.0)  L  06/18/18  07:36    


 


MCV  85.3 fL (80.0-94.0)   06/18/18  07:36    


 


MCH  27.8 pg (27.0-31.0)   06/18/18  07:36    


 


MCHC  32.6 g/dL (33.0-37.0)  L  06/18/18  07:36    


 


RDW  14.6 % (11.5-14.5)  H  06/18/18  07:36    


 


Plt Count  401 K/uL (130-400)  H  06/18/18  07:36    


 


MPV  9.7 fL (7.2-11.7)   06/18/18  07:36    


 


Neut % (Auto)  81.0 % (50.0-75.0)  H  06/18/18  07:36    


 


Lymph % (Auto)  11.4 % (20.0-40.0)  L  06/18/18  07:36    


 


Mono % (Auto)  4.5 % (0.0-10.0)   06/18/18  07:36    


 


Eos % (Auto)  2.5 % (0.0-4.0)   06/18/18  07:36    


 


Baso % (Auto)  0.6 % (0.0-2.0)   06/18/18  07:36    


 


Neut # (Auto)  9.3 K/uL (1.8-7.0)  H  06/18/18  07:36    


 


Lymph # (Auto)  1.3 K/uL (1.0-4.3)   06/18/18  07:36    


 


Mono # (Auto)  0.5 K/uL (0.0-0.8)   06/18/18  07:36    


 


Eos # (Auto)  0.3 K/uL (0.0-0.7)   06/18/18  07:36    


 


Baso # (Auto)  0.1 K/uL (0.0-0.2)   06/18/18  07:36    


 


Neutrophils % (Manual)  86 % (50-75)  H  06/14/18  07:09    


 


Band Neutrophils %  1 % (0-2)   06/14/18  07:09    


 


Lymphocytes % (Manual)  8 % (20-40)  L  06/14/18  07:09    


 


Reactive Lymphs %  2 % (0-0)  H  06/08/18  08:13    


 


Monocytes % (Manual)  2 % (0-10)   06/14/18  07:09    


 


Eosinophils % (Manual)  3 % (0-4)   06/14/18  07:09    


 


Myelocytes %  1 % (0-0)  H  06/13/18  06:07    


 


Toxic Granulation  Present   06/08/18  08:13    


 


Platelet Estimate  Normal  (NORMAL)   06/14/18  07:09    


 


Large Platelets  Present   06/11/18  22:00    


 


RBC Morphology  Normal   06/08/18  08:13    


 


Hypochromasia (manual)  Slight   06/13/18  06:07    


 


Poikilocytosis (manual  Slight   06/13/18  06:07    


 


Anisocytosis (manual)  Slight   06/13/18  06:07    


 


Microcytosis (manual)  Slight   06/11/18  22:00    


 


Ovalocytes  Slight   06/13/18  06:07    


 


PT  12.0 SECONDS (9.7-12.2)   06/11/18  06:15    


 


INR  1.1   06/11/18  06:15    


 


APTT  24 SECONDS (21-34)  D 06/11/18  13:52    


 


Sodium  146 mmol/L (132-148)   06/18/18  06:18    


 


Potassium  4.1 mmol/L (3.6-5.2)   06/18/18  06:18    


 


Chloride  111 mmol/L ()  H  06/18/18  06:18    


 


Carbon Dioxide  26 mmol/L (22-30)   06/18/18  06:18    


 


Anion Gap  14  (10-20)   06/18/18  06:18    


 


BUN  10 mg/dL (9-20)   06/18/18  06:18    


 


Creatinine  1.0 mg/dL (0.8-1.5)   06/18/18  06:18    


 


Est GFR ( Amer)  > 60   06/18/18  06:18    


 


Est GFR (Non-Af Amer)  > 60   06/18/18  06:18    


 


POC Glucose (mg/dL)  162 mg/dL ()  H  06/12/18  11:18    


 


Random Glucose  116 mg/dL ()  H  06/18/18  06:18    


 


Calcium  8.2 mg/dl (8.6-10.4)  L  06/18/18  06:18    


 


Ionized Calcium  4.8 mg/dL (4.80-5.60)   06/02/18  08:10    


 


Phosphorus  2.9 mg/dL (2.5-4.5)   06/18/18  06:18    


 


Magnesium  2.1 mg/dL (1.6-2.3)   06/18/18  06:18    


 


Total Bilirubin  0.8 mg/dL (0.2-1.3)   06/18/18  06:18    


 


AST  35 U/L (17-59)   06/18/18  06:18    


 


ALT  42 U/L (21-72)   06/18/18  06:18    


 


Alkaline Phosphatase  121 U/L ()   06/18/18  06:18    


 


Total Creatine Kinase  145 U/L ()   06/03/18  03:04    


 


CK-MB (Mass)  0.87 ng/mL (0.0-3.38)   06/03/18  03:04    


 


Troponin I  < 0.0120 ng/mL (0.00-0.120)   06/03/18  03:04    


 


Total Protein  5.7 g/dL (6.3-8.3)  L  06/18/18  06:18    


 


Albumin  2.8 g/dL (3.5-5.0)  L D 06/18/18  06:18    


 


Globulin  2.9 gm/dL (2.2-3.9)   06/18/18  06:18    


 


Albumin/Globulin Ratio  1.0  (1.0-2.1)   06/18/18  06:18    


 


Urine Color  Yellow  (YELLOW)   06/14/18  15:15    


 


Urine Clarity  Clear  (Clear)   06/14/18  15:15    


 


Urine pH  5.0  (5.0-8.0)   06/14/18  15:15    


 


Ur Specific Gravity  1.012  (1.003-1.030)   06/14/18  15:15    


 


Urine Protein  Negative mg/dL (NEGATIVE)   06/14/18  15:15    


 


Urine Glucose (UA)  Normal mg/dL (Normal)   06/14/18  15:15    


 


Urine Ketones  Negative mg/dL (NEGATIVE)   06/14/18  15:15    


 


Urine Blood  Negative  (NEGATIVE)   06/14/18  15:15    


 


Urine Nitrate  Negative  (NEGATIVE)   06/14/18  15:15    


 


Urine Bilirubin  Negative  (NEGATIVE)   06/14/18  15:15    


 


Urine Urobilinogen  Normal mg/dL (0.2-1.0)   06/14/18  15:15    


 


Ur Leukocyte Esterase  Neg Corrine/uL (Negative)   06/14/18  15:15    


 


Urine WBC (Auto)  1 /hpf (0-5)   06/14/18  15:15    


 


Urine RBC (Auto)  1 /hpf (0-3)   06/14/18  15:15    


 


Ur Squamous Epith Cells  < 1 /hpf (0-5)   06/14/18  15:15    


 


Urine Bacteria  Rare  (<OCC)   06/10/18  22:48    


 


Ur Random Creatinine  89.2 mg/dL  06/10/18  22:49    


 


Ur Random Sodium  35 mmol/L  06/10/18  22:49    


 


Urine Chloride  24 mmol/L ()  L  06/10/18  15:18    


 


Blood Type  A POSITIVE   06/11/18  09:21    


 


Antibody Screen  Negative   06/11/18  09:21    














- Hospital Course


Hospital Course: 





This is a 72 year old male with PMHx of PVD, DVT,ISMAEL , duodenal carcinoid who 

underwent laparoscopic right hemicolectomy 5/31/2018 for tubular adenoma (5/31/ 18 -pathology) of the ascending colon. He had persistent postoperative ileus 

and evidence of small bowel obstruction with colonic ischemia with necrosis s/p 

exploratory laparoscopy ileocolectomy 6/11/18 he returned to the OR shortly 

after for ex lap and hemostasis 2/2 hypotensive shock. Admitted to ICU for 

close monitoring. During his hospital stay he had BM, tolerated diet and 

ambulated. Pain controlled. Educated family with dressing care. Pt is set up 

for home visit. Physical therapy recommended DC home. 


 





Discharge Exam





- Head Exam


Head Exam: ATRAUMATIC, NORMAL INSPECTION, NORMOCEPHALIC





- Eye Exam


Eye Exam: EOMI, Normal appearance, PERRL


Pupil Exam: NORMAL ACCOMODATION, PERRL





- Respiratory Exam


Respiratory Exam: NORMAL BREATHING PATTERN





- Cardiovascular Exam


Cardiovascular Exam: REGULAR RHYTHM, +S1, +S2





- GI/Abdominal Exam


GI & Abdominal Exam: Normal Bowel Sounds, Tenderness.  absent: Distended, Firm, 

Hernia


Additional comments: 





Incision open: packed with wet kerlix. 1 staple in place. 40cm incision. 





- Extremities Exam


Extremities exam: full ROM





- Back Exam


Back exam: FULL ROM





- Neurological Exam


Neurological exam: Alert, CN II-XII Intact, Normal Gait, Oriented x3, Reflexes 

Normal





- Psychiatric Exam


Psychiatric exam: Normal Affect, Normal Mood





- Skin


Skin Exam: Warm





Discharge Plan





- Discharge Medications


Prescriptions: 


Ciprofloxacin [Cipro] 500 mg PO BID #10 tab


Docusate Sodium [Colace] 100 mg PO DAILY #10 capsule


metroNIDAZOLE [Flagyl] 500 mg PO BID #10 tab


Oxycodone HCl/Acetaminophen [Percocet  mg Tablet] 1 each PO Q4 PRN #15 

tablet


 PRN Reason: Pain, Moderate (4-7)





- Follow Up Plan


Condition: GOOD


Disposition: HOME/ ROUTINE


Instructions:  Ciprofloxacin (Systemic), Low Fiber Diet, Metronidazole (Systemic

), Colectomy, Laparoscopic Surgery, Exploratory Laparotomy (DC), Docusate, 

Oxycodone and Acetaminophen, Managing Pain After Surgery


Additional Instructions: 


1) follow up at Dr. Villalobos's office in 1-2 weeks


2) Dressing change twice a day with wet kerlix inside midline incision and 

rights abdomen prior drain site and dry 4x4 gauze on top with tape.


3) Take percocet as needed for pain every 4hrs.


4) Take colace to prevent constipation


5) Take Ciprofloxacin and flagyl twice a day with meal for 5 days. 


6) Physical therapy











Referrals: 


Jas Villalobos MD [Staff Provider] -

## 2018-06-18 NOTE — CP.PCM.PN
<RodMohit - Last Filed: 06/18/18 07:09>





Subjective





- Date & Time of Evaluation


Date of Evaluation: 06/18/18


Time of Evaluation: 07:09





- Subjective


Subjective: 





Surgery 





Pt seen and examined. No acute events. Pain controlled. Denies feer, nausea, 

diarrea. Pt is voiding freely. Had BM overnight. Pt ambulated and is OOB. 

Dressing changed this AM.





Objective





- Vital Signs/Intake and Output


Vital Signs (last 24 hours): 


 











Temp Pulse Resp BP Pulse Ox


 


 98 F   90   20   113/72   96 


 


 06/17/18 23:40  06/17/18 23:40  06/17/18 23:40  06/17/18 23:40  06/17/18 23:40








Intake and Output: 


 











 06/18/18 06/18/18





 06:59 18:59


 


Intake Total 315 


 


Output Total 265 


 


Balance 50 














- Medications


Medications: 


 Current Medications





Albuterol/Ipratropium (Duoneb 3 Mg/0.5 Mg (3 Ml) Ud)  3 ml INH RQ4 PRN


   PRN Reason: Shortness of Breath


Benzocaine/Menthol (Cepacol Sore Throat)  1 emmanuel MT Q4 PRN


   PRN Reason: Sore Throat


   Last Admin: 06/06/18 13:06 Dose:  1 emmanuel


Diphenhydramine HCl (Benadryl)  50 mg IVP HS PRN


   PRN Reason: Insomnia


   Last Admin: 06/17/18 22:53 Dose:  50 mg


Docusate Sodium (Colace)  100 mg PO DAILY [facility]


Heparin Sodium (Porcine) (Heparin)  5,000 units SC Q8 [facility]


   Last Admin: 06/18/18 05:27 Dose:  5,000 units


BUPIVACAINE 0.125%/0.9% NACL (Bupivacaine-Ns 0.125% On-Q )  600 mls @ 4 mls/

hr IJ ONCE ONE


   Stop: 06/19/18 23:16


   Last Admin: 06/13/18 22:47 Dose:  4 mls/hr


Lidocaine (Lidoderm)  1 ea TD DAILY@0800 [facility]


   Last Admin: 06/17/18 09:29 Dose:  1 ea


Lidocaine (Lidoderm)  1 ea TD DAILY [facility]


   Last Admin: 06/17/18 09:30 Dose:  1 ea


Metoclopramide HCl (Reglan)  5 mg IVP Q6 [facility]


   Last Admin: 06/18/18 05:26 Dose:  5 mg


Ondansetron HCl (Zofran Inj)  4 mg IV Q4 PRN


   PRN Reason: Nausea/Vomiting


   Last Admin: 06/12/18 09:24 Dose:  4 mg


Oxycodone/Acetaminophen (Percocet 5/325 Mg Tab)  1 tab PO Q4H PRN


   PRN Reason: Pain, moderate (4-7)


   Stop: 06/20/18 18:12


   Last Admin: 06/18/18 05:26 Dose:  1 tab


Pantoprazole Sodium (Protonix Susp)  40 mg PO 0600 [facility]


   Last Admin: 06/18/18 05:26 Dose:  40 mg











- Labs


Labs: 


 





 06/16/18 07:21 





 06/18/18 06:18 





 











PT  12.0 SECONDS (9.7-12.2)   06/11/18  06:15    


 


INR  1.1   06/11/18  06:15    


 


APTT  24 SECONDS (21-34)  D 06/11/18  13:52    














- Constitutional


Appears: No Acute Distress





- Head Exam


Head Exam: ATRAUMATIC, NORMAL INSPECTION, NORMOCEPHALIC





- Eye Exam


Eye Exam: EOMI, Normal appearance, PERRL


Pupil Exam: NORMAL ACCOMODATION, PERRL





- ENT Exam


ENT Exam: Mucous Membranes Moist, Normal Exam





- Neck Exam


Neck Exam: Full ROM, Normal Inspection.  absent: Lymphadenopathy





- Respiratory Exam


Respiratory Exam: Clear to Ausculation Bilateral, NORMAL BREATHING PATTERN





- Cardiovascular Exam


Cardiovascular Exam: REGULAR RHYTHM, +S1, +S2.  absent: Murmur





- GI/Abdominal Exam


GI & Abdominal Exam: Soft, Normal Bowel Sounds.  absent: Distended, Firm, 

Guarding, Rigid, Tenderness, Mass, Rebound


Additional comments: 





Dressing applied. 1 staples in place. No signs of infection. 40cm incision 

open. 





-  Exam


 Exam: NORMAL INSPECTION





- Extremities Exam


Extremities Exam: Full ROM, Normal Capillary Refill, Normal Inspection.  absent

: Joint Swelling, Pedal Edema





- Back Exam


Back Exam: NORMAL INSPECTION





- Neurological Exam


Neurological Exam: Alert, Awake, CN II-XII Intact, Normal Gait, Oriented x3





- Psychiatric Exam


Psychiatric exam: Normal Affect, Normal Mood





- Skin


Skin Exam: Warm.  absent: Erythema, Intact





Assessment and Plan





- Assessment and Plan (Free Text)


Assessment: 





72M POD 18 S/P robotic right hemicolectomy due to incomplete excision of 

adenomatous polyp, POD#7 s/p exlap ileocolectomy for EC fistula





Tim drain 25cc/24hr serous, 5cc/8hrs. 


Urine output not accurately recorded: voiding freely. 








Plan: 


- Low residual diet 


-Pain control: Percocet 


- Continue DVT/ GI ppx


- Replete electrolyte PRN 


- IS/ OOB / ambulate 


- Discuss D/C planning with home services with case management: Recommends home 

care


-Possible DC soon





Further recs per Dr. Villalobos





<Jas Villalobos - Last Filed: 06/18/18 09:53>





Objective





- Vital Signs/Intake and Output


Vital Signs (last 24 hours): 


 











Temp Pulse Resp BP Pulse Ox


 


 98.0 F   84   20   107/69   95 


 


 06/18/18 07:00  06/18/18 07:00  06/18/18 07:00  06/18/18 07:00  06/18/18 07:00








Intake and Output: 


 











 06/18/18 06/18/18





 06:59 18:59


 


Intake Total 515 


 


Output Total 265 


 


Balance 250 














- Medications


Medications: 


 Current Medications





Albuterol/Ipratropium (Duoneb 3 Mg/0.5 Mg (3 Ml) Ud)  3 ml INH RQ4 PRN


   PRN Reason: Shortness of Breath


Benzocaine/Menthol (Cepacol Sore Throat)  1 emmanuel MT Q4 PRN


   PRN Reason: Sore Throat


   Last Admin: 06/06/18 13:06 Dose:  1 emmanuel


Diphenhydramine HCl (Benadryl)  50 mg IVP HS PRN


   PRN Reason: Insomnia


   Last Admin: 06/17/18 22:53 Dose:  50 mg


Docusate Sodium (Colace)  100 mg PO DAILY [facility]


Heparin Sodium (Porcine) (Heparin)  5,000 units SC Q8 [facility]


   Last Admin: 06/18/18 05:27 Dose:  5,000 units


BUPIVACAINE 0.125%/0.9% NACL (Bupivacaine-Ns 0.125% On-Q )  600 mls @ 4 mls/

hr IJ ONCE ONE


   Stop: 06/19/18 23:16


   Last Admin: 06/13/18 22:47 Dose:  4 mls/hr


Lidocaine (Lidoderm)  1 ea TD DAILY@0800 [facility]


   Last Admin: 06/18/18 08:40 Dose:  1 ea


Lidocaine (Lidoderm)  1 ea TD DAILY [facility]


   Last Admin: 06/17/18 09:30 Dose:  1 ea


Metoclopramide HCl (Reglan)  5 mg IVP Q6 [facility]


   Last Admin: 06/18/18 05:26 Dose:  5 mg


Ondansetron HCl (Zofran Inj)  4 mg IV Q4 PRN


   PRN Reason: Nausea/Vomiting


   Last Admin: 06/12/18 09:24 Dose:  4 mg


Oxycodone/Acetaminophen (Percocet 5/325 Mg Tab)  1 tab PO Q4H PRN


   PRN Reason: Pain, moderate (4-7)


   Stop: 06/20/18 18:12


   Last Admin: 06/18/18 05:26 Dose:  1 tab


Pantoprazole Sodium (Protonix Susp)  40 mg PO 0600 [facility]


   Last Admin: 06/18/18 05:26 Dose:  40 mg











- Labs


Labs: 


 





 06/18/18 07:36 





 06/18/18 06:18 





 











PT  12.0 SECONDS (9.7-12.2)   06/11/18  06:15    


 


INR  1.1   06/11/18  06:15    


 


APTT  24 SECONDS (21-34)  D 06/11/18  13:52    














Assessment and Plan





- Assessment and Plan (Free Text)


Plan: 


Patient seen and examined at bedside independent of resident staff. Agree with 

above assessment and plan. Return of bowel function over the weekend. 

Tolerating fulls/ low fiber diet. No nausea/vomiting. Passing BM and flatus. 

Abd soft, non-tender. Incision healing nicely with wet to dry packing. HLIV. PO 

meds. Dispo home today or tomorrow pending home needs set up per SW. 5 days PO 

abx with cipro/flagyl. Follow up tc clinic in 2-3 weeks after discharge.

## 2018-06-25 ENCOUNTER — HOSPITAL ENCOUNTER (INPATIENT)
Dept: HOSPITAL 31 - C.ER | Age: 73
LOS: 8 days | Discharge: HOME | DRG: 863 | End: 2018-07-03
Attending: SURGERY | Admitting: SURGERY
Payer: MEDICAID

## 2018-06-25 VITALS — BODY MASS INDEX: 33.2 KG/M2

## 2018-06-25 DIAGNOSIS — B99.9: ICD-10-CM

## 2018-06-25 DIAGNOSIS — G89.18: ICD-10-CM

## 2018-06-25 DIAGNOSIS — B37.49: ICD-10-CM

## 2018-06-25 DIAGNOSIS — T81.4XXA: Primary | ICD-10-CM

## 2018-06-25 DIAGNOSIS — T81.31XA: ICD-10-CM

## 2018-06-25 DIAGNOSIS — Z98.0: ICD-10-CM
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
## 2018-06-25 DIAGNOSIS — R50.82: ICD-10-CM

## 2018-06-25 LAB
ALBUMIN SERPL-MCNC: 2.7 G/DL (ref 3.5–5)
ALBUMIN/GLOB SERPL: 0.8 {RATIO} (ref 1–2.1)
ALT SERPL-CCNC: 29 U/L (ref 21–72)
AST SERPL-CCNC: 37 U/L (ref 17–59)
BACTERIA #/AREA URNS HPF: (no result) /[HPF]
BASOPHILS # BLD AUTO: 0 K/UL (ref 0–0.2)
BASOPHILS NFR BLD: 0.6 % (ref 0–2)
BILIRUB UR-MCNC: NEGATIVE MG/DL
BUN SERPL-MCNC: 9 MG/DL (ref 9–20)
CALCIUM SERPL-MCNC: 7.8 MG/DL (ref 8.6–10.4)
EOSINOPHIL # BLD AUTO: 0.1 K/UL (ref 0–0.7)
EOSINOPHIL NFR BLD: 1.9 % (ref 0–4)
ERYTHROCYTE [DISTWIDTH] IN BLOOD BY AUTOMATED COUNT: 14.9 % (ref 11.5–14.5)
GFR NON-AFRICAN AMERICAN: > 60
GLUCOSE UR STRIP-MCNC: NORMAL MG/DL
HGB BLD-MCNC: 11 G/DL (ref 12–18)
LEUKOCYTE ESTERASE UR-ACNC: (no result) LEU/UL
LIPASE: 201 U/L (ref 23–300)
LYMPHOCYTES # BLD AUTO: 0.9 K/UL (ref 1–4.3)
LYMPHOCYTES NFR BLD AUTO: 13.2 % (ref 20–40)
MCH RBC QN AUTO: 28.1 PG (ref 27–31)
MCHC RBC AUTO-ENTMCNC: 33.3 G/DL (ref 33–37)
MCV RBC AUTO: 84.2 FL (ref 80–94)
MONOCYTES # BLD: 0.5 K/UL (ref 0–0.8)
MONOCYTES NFR BLD: 7.5 % (ref 0–10)
NEUTROPHILS # BLD: 5.2 K/UL (ref 1.8–7)
NEUTROPHILS NFR BLD AUTO: 76.8 % (ref 50–75)
NRBC BLD AUTO-RTO: 0 % (ref 0–2)
PH UR STRIP: 5 [PH] (ref 5–8)
PLATELET # BLD: 351 K/UL (ref 130–400)
PMV BLD AUTO: 8.5 FL (ref 7.2–11.7)
PROT UR STRIP-MCNC: NEGATIVE MG/DL
RBC # BLD AUTO: 3.92 MIL/UL (ref 4.4–5.9)
RBC # UR STRIP: NEGATIVE /UL
SP GR UR STRIP: 1.01 (ref 1–1.03)
SQUAMOUS EPITHIAL: 2 /HPF (ref 0–5)
UROBILINOGEN UR-MCNC: NORMAL MG/DL (ref 0.2–1)
WBC # BLD AUTO: 6.7 K/UL (ref 4.8–10.8)

## 2018-06-25 NOTE — RAD
PROCEDURE:  CHEST RADIOGRAPH, 1 VIEW



HISTORY:

Cough, s/p abdominal surgery



COMPARISON:

06/13/2018.



FINDINGS:



LUNGS:

No active pulmonary disease.



PLEURA:

No pneumothorax or pleural fluid seen.



CARDIOVASCULAR:

 No radiographic findings to suggest acute or significant 

cardiovascular disease.Removal of support apparatus since the prior 

study: PICC line. 



OSSEOUS STRUCTURES:

No significant abnormalities.



VISUALIZED UPPER ABDOMEN:

Normal.



OTHER FINDINGS:

None. 



IMPRESSION:

No active disease.No significant interval change compared to the 

prior examination(s).

## 2018-06-26 VITALS — RESPIRATION RATE: 20 BRPM

## 2018-06-26 LAB
ALBUMIN SERPL-MCNC: 2.3 G/DL (ref 3.5–5)
ALBUMIN SERPL-MCNC: 2.6 G/DL (ref 3.5–5)
ALBUMIN/GLOB SERPL: 0.8 {RATIO} (ref 1–2.1)
ALBUMIN/GLOB SERPL: 0.8 {RATIO} (ref 1–2.1)
ALT SERPL-CCNC: 21 U/L (ref 21–72)
ALT SERPL-CCNC: 28 U/L (ref 21–72)
AST SERPL-CCNC: 26 U/L (ref 17–59)
AST SERPL-CCNC: 40 U/L (ref 17–59)
BASOPHILS # BLD AUTO: 0 K/UL (ref 0–0.2)
BASOPHILS NFR BLD: 0.5 % (ref 0–2)
BUN SERPL-MCNC: 8 MG/DL (ref 9–20)
BUN SERPL-MCNC: 8 MG/DL (ref 9–20)
CALCIUM SERPL-MCNC: 7.5 MG/DL (ref 8.6–10.4)
CALCIUM SERPL-MCNC: 8.2 MG/DL (ref 8.6–10.4)
EOSINOPHIL # BLD AUTO: 0.1 K/UL (ref 0–0.7)
EOSINOPHIL NFR BLD: 1.9 % (ref 0–4)
ERYTHROCYTE [DISTWIDTH] IN BLOOD BY AUTOMATED COUNT: 15 % (ref 11.5–14.5)
GFR NON-AFRICAN AMERICAN: > 60
GFR NON-AFRICAN AMERICAN: > 60
HGB BLD-MCNC: 10.9 G/DL (ref 12–18)
LYMPHOCYTES # BLD AUTO: 1.5 K/UL (ref 1–4.3)
LYMPHOCYTES NFR BLD AUTO: 20.1 % (ref 20–40)
MCH RBC QN AUTO: 28.3 PG (ref 27–31)
MCHC RBC AUTO-ENTMCNC: 33.3 G/DL (ref 33–37)
MCV RBC AUTO: 85 FL (ref 80–94)
MONOCYTES # BLD: 0.6 K/UL (ref 0–0.8)
MONOCYTES NFR BLD: 8 % (ref 0–10)
NEUTROPHILS # BLD: 5.1 K/UL (ref 1.8–7)
NEUTROPHILS NFR BLD AUTO: 69.5 % (ref 50–75)
NRBC BLD AUTO-RTO: 0.2 % (ref 0–2)
PLATELET # BLD: 292 K/UL (ref 130–400)
PMV BLD AUTO: 9 FL (ref 7.2–11.7)
RBC # BLD AUTO: 3.84 MIL/UL (ref 4.4–5.9)
WBC # BLD AUTO: 7.3 K/UL (ref 4.8–10.8)

## 2018-06-26 RX ADMIN — SODIUM CHLORIDE SCH MLS/HR: 9 INJECTION, SOLUTION INTRAVENOUS at 18:22

## 2018-06-26 RX ADMIN — SODIUM CHLORIDE SCH MLS/HR: 9 INJECTION, SOLUTION INTRAVENOUS at 06:45

## 2018-06-26 RX ADMIN — SODIUM CHLORIDE SCH MLS/HR: 9 INJECTION, SOLUTION INTRAVENOUS at 12:16

## 2018-06-26 RX ADMIN — SODIUM CHLORIDE SCH MLS/HR: 9 INJECTION, SOLUTION INTRAVENOUS at 01:33

## 2018-06-26 NOTE — CP.PCM.HP
History of Present Illness





- History of Present Illness


History of Present Illness: 





HISTORY AND PHYSICAL FOR Billie NATHAN





72M presents to [facility] (formerly [facility]) with cough and fever. Patients family is 

bedside and state he was having fevers measuring 101.5 measured via armpit 

yesterday. He used over the counter tylenol to help with this. Cough has been 

non-productive. He also states to feel a general malaise and weakness. He 

denies abdominal pain, denies nausea, vomiting. He has been eating and 

tolerating diet, and he also has been having normal bowel movements. 





PMH: right colon adenoma, EC fistula, YORDAN


PSH: Right hemicolectomy, exploratomy laparotomy


Social: denies tobacco, denies alcohol use


Allergies: Iodine





Present on Admission





- Present on Admission


Any Indicators Present on Admission: No





Past Patient History





- Past Medical History & Family History


Past Medical History?: Yes





- Past Social History


Smoking Status: Never Smoked





- CARDIAC


Hx Cardiac Disorders: Yes





- PULMONARY


Hx Respiratory Disorders: Yes


Hx Sleep Apnea: Yes (POSITIVE STUDY, DOES NOT HAVE C PAP MACHINE)





- NEUROLOGICAL


Hx Neurological Disorder: No





- HEENT


Hx HEENT Problems: No





- RENAL


Hx Chronic Kidney Disease: No





- ENDOCRINE/METABOLIC


Hx Endocrine Disorders: No





- HEMATOLOGICAL/ONCOLOGICAL


Hx Blood Disorders: No





- INTEGUMENTARY


Hx Dermatological Problems: No





- MUSCULOSKELETAL/RHEUMATOLOGICAL


Hx Musculoskeletal Disorders: Yes


Hx Arthritis: Yes





- GASTROINTESTINAL


Hx Gastrointestinal Disorders: Yes


Hx Gastritis: Yes


Other/Comment: HX DUODENAL CARCINOID





- GENITOURINARY/GYNECOLOGICAL


Hx Genitourinary Disorders: No





- PSYCHIATRIC


Hx Substance Use: No





- SURGICAL HISTORY


Hx Surgeries: Yes


Hx Orthopedic Surgery: Yes (ORIF LEFT ELBOW W PLATE PLACEMENT)


Other/Comment: HX: COLON POLYPECTOMY





- ANESTHESIA


Hx Anesthesia: Yes


Hx Anesthesia Reactions: No


Hx Malignant Hyperthermia: No





Meds


Allergies/Adverse Reactions: 


 Allergies











Allergy/AdvReac Type Severity Reaction Status Date / Time


 


iodine Allergy Severe ANAPHYLAXIS Verified 06/25/18 16:37














Physical Exam





- Constitutional


Appears: Non-toxic, No Acute Distress





- Eye Exam


Eye Exam: EOMI, PERRL





- ENT Exam


ENT Exam: Mucous Membranes Dry





- Respiratory Exam


Respiratory Exam: Clear to Auscultation Bilateral, NORMAL BREATHING PATTERN





- Cardiovascular Exam


Cardiovascular Exam: REGULAR RHYTHM, +S1, +S2





- GI/Abdominal Exam


GI & Abdominal Exam: Soft, Tenderness (mildly tender in RLQ).  absent: Distended

, Firm, Guarding, Rebound, Rigid


Additional comments: 





midline incision - two staples in place, draining serosang fluid, fibrinous 

exudates on wound bed


Previous drain site- small fibronous exudate at drain site





- Extremities Exam


Extremities exam: Negative for: pedal edema, tenderness





- Neurological Exam


Neurological exam: Alert, Oriented x3





- Psychiatric Exam


Psychiatric exam: Normal Affect, Normal Mood





- Skin


Skin Exam: Dry, Intact, Normal Color, Warm





Results





- Vital Signs


Recent Vital Signs: 





 Last Vital Signs











Temp  99.2 F   06/25/18 23:50


 


Pulse  96 H  06/25/18 23:50


 


Resp  22   06/25/18 23:50


 


BP  107/68   06/25/18 23:50


 


Pulse Ox  98   06/25/18 23:50














- Labs


Result Diagrams: 


 06/25/18 17:27





 06/25/18 17:27


Labs: 





 Laboratory Results - last 24 hr











  06/25/18 06/25/18 06/25/18





  17:27 17:27 17:29


 


WBC  6.7  


 


RBC  3.92 L  


 


Hgb  11.0 L  


 


Hct  33.0 L  


 


MCV  84.2  


 


MCH  28.1  


 


MCHC  33.3  


 


RDW  14.9 H  


 


Plt Count  351  


 


MPV  8.5  


 


Neut % (Auto)  76.8 H  


 


Lymph % (Auto)  13.2 L  


 


Mono % (Auto)  7.5  


 


Eos % (Auto)  1.9  


 


Baso % (Auto)  0.6  


 


Neut # (Auto)  5.2  


 


Lymph # (Auto)  0.9 L  


 


Mono # (Auto)  0.5  


 


Eos # (Auto)  0.1  


 


Baso # (Auto)  0.0  


 


Sodium   139 


 


Potassium   3.9 


 


Chloride   102 


 


Carbon Dioxide   31 H 


 


Anion Gap   9 L 


 


BUN   9 


 


Creatinine   1.0 


 


Est GFR ( Amer)   > 60 


 


Est GFR (Non-Af Amer)   > 60 


 


Random Glucose   154 H 


 


Calcium   7.8 L 


 


Total Bilirubin   0.4 


 


AST   37 


 


ALT   29 


 


Alkaline Phosphatase   215 H D 


 


Total Protein   6.0 L 


 


Albumin   2.7 L 


 


Globulin   3.2 


 


Albumin/Globulin Ratio   0.8 L 


 


Lipase   201 


 


Urine Color    Yellow


 


Urine Clarity    Clear


 


Urine pH    5.0


 


Ur Specific Gravity    1.014


 


Urine Protein    Negative


 


Urine Glucose (UA)    Normal


 


Urine Ketones    Negative


 


Urine Blood    Negative


 


Urine Nitrate    Negative


 


Urine Bilirubin    Negative


 


Urine Urobilinogen    Normal


 


Ur Leukocyte Esterase    2+ H


 


Urine WBC (Auto)    16 H


 


Urine RBC (Auto)    3


 


Ur Squamous Epith Cells    2


 


Urine Bacteria    Few H














Assessment & Plan





- Assessment and Plan (Free Text)


Assessment: 





72M s/p right hemicolectomy with re-operation for leak/ES fistula/necrotic 

anastomosis POD#25


Plan: 





- Admit patient


- NPO, IVF


- Pain control


- Wet-dry dressing for wounds


- Repeat AM labs


- CT scan Chest/Abd/Pelvis PO contrast


- Possible antibiotics pending CT report


Discussed with Dr. Nathan Dietrich, PGY2

## 2018-06-26 NOTE — CT
PROCEDURE:  CT Chest, Abdomen and Pelvis without intravenous contrast



HISTORY:

cough, fever, abd pain



COMPARISON:

06/10/2018



TECHNIQUE:

Radiation dose:



Total exam DLP =  mGy-cm.



This CT exam was performed using one or more of the following dose 

reduction techniques: Automated exposure control, adjustment of the 

mA and/or kV according to patient size, and/or use of iterative 

reconstruction technique.



FINDINGS:



CT CHEST WITHOUT CONTRAST:



LUNGS:

Mild bibasilar subpleural thickening and discoid atelectasis.



MEDIASTINUM:

Unremarkable. Normal caliber aorta and pulmonary arterial trunk. 

Normal size heart.



LYMPH NODES:

Unremarkable.



PLEURA:

Unremarkable. No pneumothorax. No pleural fluid.



BONES:

Unremarkable.



OTHER FINDINGS:

None.



CT ABDOMEN AND PELVIS:



LIVER:

Unremarkable. No gross lesion or ductal dilatation. 



GALLBLADDER AND BILE DUCTS:

Unremarkable. 



PANCREAS:

Unremarkable. No gross lesion or ductal dilatation.



SPLEEN:

Unremarkable. 



ADRENALS:

Unremarkable. No mass. 



KIDNEYS AND URETERS:

Unremarkable. No hydronephrosis. No solid mass. 



VASCULATURE:

Unremarkable. No aortic aneurysm. 



BOWEL:

Status post right hemicolectomy with extensive post surgical or 

inflammatory changes with mesenteric infiltration and fluid in the 

right mid abdomen.  No drainable abscess at this time; recommend 

follow-up. 



APPENDIX:

Normal appendix. 



PERITONEUM:

Unremarkable. No free fluid. No free air. 



LYMPH NODES:

Unremarkable. No enlarged lymph nodes. 



BLADDER:

Unremarkable. 



REPRODUCTIVE:

Unremarkable. 



BONES:

No acute fracture. 



OTHER FINDINGS:

Open incision noted in the ventral abdominal wall. .



IMPRESSION:

Status post right hemicolectomy with extensive post surgical or 

inflammatory changes with mesenteric infiltration and fluid in the 

right mid abdomen.  No drainable abscess at this time; recommend 

follow-up.

## 2018-06-26 NOTE — C.PDOC
History Of Present Illness


Pt c/o generalized weakness. Pt is s/p right hemicolectomy with a complication 

of enterocutaneous fistula. Family states pt had a fever yesterday. 


Time Seen by Provider: 06/25/18 16:45


Chief Complaint (Nursing): Medical Clearance


History Per: Patient, Family


Onset/Duration Of Symptoms: Days


Current Symptoms Are (Timing): Still Present


Severity: Moderate


Reports Recently: Hospitalized


Additional History Per: Prior Records





Past Medical History


Reviewed: Historical Data, Nursing Documentation, Vital Signs


Vital Signs: 





 Last Vital Signs











Temp  99.2 F   06/25/18 23:50


 


Pulse  96 H  06/25/18 23:50


 


Resp  22   06/25/18 23:50


 


BP  107/68   06/25/18 23:50


 


Pulse Ox  98   06/25/18 23:50














- Medical History


PMH: Arthritis, Colonic Polyps, Deep Vein Thrombosis ("one year ago" as per 

patient), Gastritis, Sleep Apnea (POSITIVE STUDY, DOES NOT HAVE C PAP MACHINE)


Surgical History: Endoscopy


Other Surgeries: Hemocolectomy





- [facility] Procedures











 (05/31/18)


DRAINAGE OF ABD WALL WITH DRAIN DEV, PERC ENDO APPROACH (05/31/18)


EXCISION OF ABD SUBCU/FASCIA, OPEN APPROACH (05/31/18)


EXCISION OF LARGE INTESTINE, OPEN APPROACH (05/31/18)


INTRODUCTION OF NUTRITIONAL INTO PERIPH VEIN, PERC APPROACH (05/31/18)


REPAIR MESENTERY, PERCUTANEOUS ENDOSCOPIC APPROACH (05/31/18)


RESECTION OF RIGHT LARGE INTESTINE, PERC ENDO APPROACH (05/31/18)








Family History: States: Unknown Family Hx





- Social History


Hx Alcohol Use: No


Hx Substance Use: No





- Immunization History


Hx Tetanus Toxoid Vaccination: No


Hx Influenza Vaccination: No


Hx Pneumococcal Vaccination: Yes





Review Of Systems


Except As Marked, All Systems Reviewed And Found Negative.


Constitutional: Positive for: Fever, Weakness, Malaise


Cardiovascular: Negative for: Chest Pain


Respiratory: Positive for: Cough.  Negative for: Shortness of Breath


Gastrointestinal: Positive for: Abdominal Pain.  Negative for: Vomiting


Musculoskeletal: Negative for: Neck Pain, Back Pain


Skin: Negative for: Rash


Neurological: Negative for: Weakness, Numbness





Physical Exam





- Physical Exam


Appears: Non-toxic, No Acute Distress


Skin: Normal Color, Warm, Dry


Head: Atraumatic, Normacephalic


Eye(s): bilateral: PERRL, EOMI


Neck: Normal ROM, Supple


Cardiovascular: Rhythm Regular


Respiratory: Normal Breath Sounds, No Accessory Muscle Use


Gastrointestinal/Abdominal: Soft, Other (Open surgical wound packed with gauze)


Back: No CVA Tenderness


Extremity: Normal ROM, No Pedal Edema, No Calf Tenderness


Neurological/Psych: Oriented x3, Normal Motor, Normal Sensation





ED Course And Treatment





- Laboratory Results


Result Diagrams: 


 06/25/18 17:27





 06/25/18 17:27


O2 Sat by Pulse Oximetry: 98


Pulse Ox Interpretation: Normal





- Radiology


CXR: Interpreted by Me, Viewed By Me


CXR Interpretation: Yes: Other (Atelectasis)


Progress Note: Pt was evaluated by the surgery resident who d/w Dr. Jas Villalobos. 

They ordered a CT scan of chest/abd/pelv and they will admit pt to their 

service and follow up with CT scan.





Progress





- Interventions


Interventions:: Observation, Intravenous fluid





- Medications Administered


Oral: Opiate





- Data Reviewed


Data Reviewed: Lab, Diagnostic imaging, Old records





- Patient Status


Patient status: Partially improved





- Continuity of Care


Discussed patient case with:: Patient, Family-HIPPA compliant, ED Nurse


Discussed pt. case with consultant/specialty: General Surgery





- Patient Plan


Patient Plan: Admission





Disposition


Counseled Patient/Family Regarding: Studies Performed, Diagnosis





- Disposition


Disposition: HOSPITALIZED


Disposition Time: 00:21


Condition: FAIR





- Clinical Impression


Clinical Impression: 


 Postoperative fever, Postoperative abdominal pain

## 2018-06-26 NOTE — CP.PCM.PN
Subjective





- Date & Time of Evaluation


Date of Evaluation: 06/26/18


Time of Evaluation: 07:38





- Subjective


Subjective: 





Surgery 





Pt seen and examined. C/O abd pain. REceived one dose of 1mg overnight. Denies 

nausea, vomiting, diarrhea. Had BM yesterday. Passing flatus. + ambulating. 

voiding freely. Denies coughing, CP, SOB. Dressing changed this AM





Objective





- Vital Signs/Intake and Output


Vital Signs (last 24 hours): 


 











Temp Pulse Resp BP Pulse Ox


 


 98.5 F   99 H  20   122/74   96 


 


 06/26/18 02:00  06/26/18 02:00  06/26/18 02:00  06/26/18 02:00  06/26/18 02:00











- Medications


Medications: 


 Current Medications





Acetaminophen (Tylenol 650 Mg Supp)  650 mg IN Q4 PRN


   PRN Reason: Fever >100.4 F


Hydromorphone HCl (Dilaudid)  1 mg IVP Q4H PRN


   PRN Reason: Pain, severe (8-10)


   Last Admin: 06/26/18 02:19 Dose:  1 mg


Sodium Chloride (Sodium Chloride 0.9%)  1,000 mls @ 125 mls/hr IV .Q8H [facility]


   Last Admin: 06/26/18 00:13 Dose:  125 mls/hr


Piperacillin Sod/Tazobactam (Sod 3.375 gm/ Sodium Chloride)  100 mls @ 200 mls/

hr IVPB Q6H [facility]


   PRN Reason: Protocol


   Last Admin: 06/26/18 06:45 Dose:  200 mls/hr


Ondansetron HCl (Zofran Inj)  4 mg IVP Q4 PRN


   PRN Reason: Nausea/Vomiting











- Labs


Labs: 


 





 06/25/18 17:27 





 06/25/18 17:27 











- Constitutional


Appears: No Acute Distress





- Head Exam


Head Exam: ATRAUMATIC, NORMAL INSPECTION, NORMOCEPHALIC





- Eye Exam


Eye Exam: EOMI, Normal appearance, PERRL


Pupil Exam: NORMAL ACCOMODATION, PERRL





- ENT Exam


ENT Exam: Mucous Membranes Moist, Normal Exam





- Neck Exam


Neck Exam: Full ROM, Normal Inspection.  absent: Lymphadenopathy





- Respiratory Exam


Respiratory Exam: NORMAL BREATHING PATTERN





- Cardiovascular Exam


Cardiovascular Exam: REGULAR RHYTHM





- GI/Abdominal Exam


GI & Abdominal Exam: Soft, Tenderness.  absent: Distended, Firm, Guarding, Rigid

, Mass, Pulsatile Mass, Rebound


Additional comments: 





midline Incision is open. 3x 50cm x 2cm. Forming granulation tissues. sutures 

exposed. few lose sutures in the lower end. No eviceration. Dressing changed. 





-  Exam


 Exam: NORMAL INSPECTION





- Extremities Exam


Extremities Exam: Full ROM, Normal Capillary Refill, Normal Inspection.  absent

: Joint Swelling, Pedal Edema, Tenderness





- Back Exam


Back Exam: NORMAL INSPECTION





- Neurological Exam


Neurological Exam: Alert, Awake, CN II-XII Intact, Normal Gait, Oriented x3





- Psychiatric Exam


Psychiatric exam: Normal Affect, Normal Mood





- Skin


Skin Exam: Warm.  absent: Intact





Assessment and Plan





- Assessment and Plan (Free Text)


Assessment: 





72M came with wound dehiscence and fever s/p right hemicolectomy with re-

operation for leak/ES fistula/necrotic anastomosis POD#26


Afebrile overnight





Plan: 





-IR evaluation 


- NPO, IVF


- Pain control


- Wet-dry dressing for wounds


- Repeat AM labs


- CT scan Chest/Abd/Pelvis PO contrast follow up official reads. 


- Possible antibiotics pending CT report


will Discuss with Dr. Villalobos

## 2018-06-27 LAB
ALBUMIN SERPL-MCNC: 2.3 G/DL (ref 3.5–5)
ALBUMIN/GLOB SERPL: 0.8 {RATIO} (ref 1–2.1)
ALT SERPL-CCNC: 30 U/L (ref 21–72)
AST SERPL-CCNC: 30 U/L (ref 17–59)
BASOPHILS # BLD AUTO: 0 K/UL (ref 0–0.2)
BASOPHILS NFR BLD: 0.4 % (ref 0–2)
BUN SERPL-MCNC: 7 MG/DL (ref 9–20)
CALCIUM SERPL-MCNC: 7.7 MG/DL (ref 8.6–10.4)
EOSINOPHIL # BLD AUTO: 0.3 K/UL (ref 0–0.7)
EOSINOPHIL NFR BLD: 5.2 % (ref 0–4)
ERYTHROCYTE [DISTWIDTH] IN BLOOD BY AUTOMATED COUNT: 15.4 % (ref 11.5–14.5)
GFR NON-AFRICAN AMERICAN: > 60
HGB BLD-MCNC: 9.8 G/DL (ref 12–18)
LYMPHOCYTES # BLD AUTO: 1.2 K/UL (ref 1–4.3)
LYMPHOCYTES NFR BLD AUTO: 18.2 % (ref 20–40)
MCH RBC QN AUTO: 27.8 PG (ref 27–31)
MCHC RBC AUTO-ENTMCNC: 33.1 G/DL (ref 33–37)
MCV RBC AUTO: 83.9 FL (ref 80–94)
MONOCYTES # BLD: 0.4 K/UL (ref 0–0.8)
MONOCYTES NFR BLD: 6.4 % (ref 0–10)
NEUTROPHILS # BLD: 4.4 K/UL (ref 1.8–7)
NEUTROPHILS NFR BLD AUTO: 69.8 % (ref 50–75)
NRBC BLD AUTO-RTO: 0 % (ref 0–2)
PLATELET # BLD: 293 K/UL (ref 130–400)
PMV BLD AUTO: 8.8 FL (ref 7.2–11.7)
RBC # BLD AUTO: 3.54 MIL/UL (ref 4.4–5.9)
WBC # BLD AUTO: 6.4 K/UL (ref 4.8–10.8)

## 2018-06-27 RX ADMIN — PANTOPRAZOLE SODIUM SCH MG: 40 GRANULE, DELAYED RELEASE ORAL at 16:23

## 2018-06-27 RX ADMIN — SODIUM CHLORIDE SCH MLS/HR: 9 INJECTION, SOLUTION INTRAVENOUS at 06:06

## 2018-06-27 RX ADMIN — SODIUM CHLORIDE SCH MLS/HR: 9 INJECTION, SOLUTION INTRAVENOUS at 13:41

## 2018-06-27 RX ADMIN — SODIUM CHLORIDE SCH MLS/HR: 9 INJECTION, SOLUTION INTRAVENOUS at 00:48

## 2018-06-27 RX ADMIN — SODIUM CHLORIDE SCH MLS/HR: 9 INJECTION, SOLUTION INTRAVENOUS at 18:48

## 2018-06-27 RX ADMIN — PANTOPRAZOLE SODIUM SCH MG: 40 GRANULE, DELAYED RELEASE ORAL at 06:07

## 2018-06-27 NOTE — CP.PCM.PN
Subjective





- Date & Time of Evaluation


Date of Evaluation: 06/27/18


Time of Evaluation: 12:40





- Subjective


Subjective: 





Surgery 


Pt seen and examined with wound care. Denies fever, nausea, diarrhea. Had 

regular BM. TOlerating CLD. Pain controlled. Took 1 toradol overnight. OOB. 

Voiding freely. Dressing changed this AM by wound care nurse. REcommends wound 

vac. 





Objective





- Vital Signs/Intake and Output


Vital Signs (last 24 hours): 


 











Temp Pulse Resp BP Pulse Ox


 


 98 F   85   20   112/72   97 


 


 06/27/18 08:16  06/27/18 08:16  06/27/18 08:16  06/27/18 08:16  06/27/18 08:16








Intake and Output: 


 











 06/27/18 06/27/18





 06:59 18:59


 


Intake Total 2320 


 


Output Total 400 


 


Balance 1920 














- Medications


Medications: 


 Current Medications





Acetaminophen (Tylenol 650mg/20.3ml Solution Ud)  650 mg PO Q6 PRN


   PRN Reason: Pain, Mild (1-3)


Heparin Sodium (Porcine) (Heparin)  5,000 units SC Q8 [facility]


   Last Admin: 06/27/18 06:07 Dose:  5,000 units


Sodium Chloride (Sodium Chloride 0.9%)  1,000 mls @ 125 mls/hr IV .Q8H [facility]


   Last Admin: 06/27/18 06:59 Dose:  125 mls/hr


Piperacillin Sod/Tazobactam (Sod 3.375 gm/ Sodium Chloride)  100 mls @ 200 mls/

hr IVPB Q6H [facility]


   PRN Reason: Protocol


   Last Admin: 06/27/18 06:06 Dose:  200 mls/hr


Ketorolac Tromethamine (Toradol)  30 mg IVP Q6 PRN


   PRN Reason: Pain, moderate (4-7)


   Last Admin: 06/26/18 22:31 Dose:  30 mg


Ondansetron HCl (Zofran Inj)  4 mg IVP Q4 PRN


   PRN Reason: Nausea/Vomiting


Pantoprazole Sodium (Protonix Susp)  40 mg PO 0600,1600 [facility]


   Last Admin: 06/27/18 06:07 Dose:  40 mg











- Labs


Labs: 


 





 06/27/18 07:01 





 06/27/18 07:01 











- Constitutional


Appears: No Acute Distress





- Head Exam


Head Exam: ATRAUMATIC, NORMAL INSPECTION, NORMOCEPHALIC





- Eye Exam


Eye Exam: EOMI, Normal appearance, PERRL


Pupil Exam: NORMAL ACCOMODATION, PERRL





- ENT Exam


ENT Exam: Mucous Membranes Moist, Normal Exam





- Neck Exam


Neck Exam: Full ROM, Normal Inspection.  absent: Lymphadenopathy





- Respiratory Exam


Respiratory Exam: NORMAL BREATHING PATTERN





- Cardiovascular Exam


Cardiovascular Exam: absent: Tachycardia





- GI/Abdominal Exam


GI & Abdominal Exam: Soft, Tenderness.  absent: Distended, Firm, Guarding, Rigid

, Hernia, Mass, Rebound


Additional comments: 





[id number] lower wound opening. exposed sutures. one loose running suture. 

Granulation tissues. 


[id number] mid open wound. granulation tissues and exposed sutures. MInimal 

drainage. 


TTP. BInder in place. R lower abd old drain site closed. 





-  Exam


 Exam: NORMAL INSPECTION





- Extremities Exam


Extremities Exam: Full ROM, Normal Capillary Refill, Normal Inspection.  absent

: Joint Swelling, Pedal Edema





- Back Exam


Back Exam: NORMAL INSPECTION





- Neurological Exam


Neurological Exam: Alert, Awake, CN II-XII Intact, Normal Gait, Oriented x3





- Psychiatric Exam


Psychiatric exam: Normal Affect, Normal Mood





- Skin


Skin Exam: Warm.  absent: Intact





Assessment and Plan





- Assessment and Plan (Free Text)


Assessment: 





open wound s/p exploratory laparotomy 





Will place wound vac and wound debridement per wound care recommendation


Possible home wound vac


Soft diet, no carbonated liquids. 


ABX


f/u urine cx


VS


fu labs


Nutrition consult


Ambulate





DW Dr. Villalobos

## 2018-06-28 LAB
ALBUMIN SERPL-MCNC: 2.4 G/DL (ref 3.5–5)
ALBUMIN/GLOB SERPL: 0.8 {RATIO} (ref 1–2.1)
ALT SERPL-CCNC: 31 U/L (ref 21–72)
AST SERPL-CCNC: 35 U/L (ref 17–59)
BASOPHILS # BLD AUTO: 0 K/UL (ref 0–0.2)
BASOPHILS NFR BLD: 0.5 % (ref 0–2)
BUN SERPL-MCNC: 8 MG/DL (ref 9–20)
CALCIUM SERPL-MCNC: 7.9 MG/DL (ref 8.6–10.4)
EOSINOPHIL # BLD AUTO: 0.3 K/UL (ref 0–0.7)
EOSINOPHIL NFR BLD: 6.3 % (ref 0–4)
ERYTHROCYTE [DISTWIDTH] IN BLOOD BY AUTOMATED COUNT: 14.9 % (ref 11.5–14.5)
GFR NON-AFRICAN AMERICAN: > 60
HGB BLD-MCNC: 10.1 G/DL (ref 12–18)
LYMPHOCYTES # BLD AUTO: 1.1 K/UL (ref 1–4.3)
LYMPHOCYTES NFR BLD AUTO: 21.6 % (ref 20–40)
MCH RBC QN AUTO: 27.6 PG (ref 27–31)
MCHC RBC AUTO-ENTMCNC: 32.7 G/DL (ref 33–37)
MCV RBC AUTO: 84.3 FL (ref 80–94)
MONOCYTES # BLD: 0.4 K/UL (ref 0–0.8)
MONOCYTES NFR BLD: 7.2 % (ref 0–10)
NEUTROPHILS # BLD: 3.3 K/UL (ref 1.8–7)
NEUTROPHILS NFR BLD AUTO: 64.4 % (ref 50–75)
NRBC BLD AUTO-RTO: 0.1 % (ref 0–2)
PLATELET # BLD: 319 K/UL (ref 130–400)
PMV BLD AUTO: 8.6 FL (ref 7.2–11.7)
RBC # BLD AUTO: 3.65 MIL/UL (ref 4.4–5.9)
WBC # BLD AUTO: 5.2 K/UL (ref 4.8–10.8)

## 2018-06-28 RX ADMIN — PANTOPRAZOLE SODIUM SCH MG: 40 GRANULE, DELAYED RELEASE ORAL at 06:05

## 2018-06-28 RX ADMIN — SODIUM CHLORIDE SCH MLS/HR: 9 INJECTION, SOLUTION INTRAVENOUS at 12:52

## 2018-06-28 RX ADMIN — SODIUM CHLORIDE SCH MLS/HR: 9 INJECTION, SOLUTION INTRAVENOUS at 00:21

## 2018-06-28 RX ADMIN — SODIUM CHLORIDE SCH MLS/HR: 9 INJECTION, SOLUTION INTRAVENOUS at 06:04

## 2018-06-28 RX ADMIN — SODIUM CHLORIDE SCH MLS/HR: 9 INJECTION, SOLUTION INTRAVENOUS at 19:50

## 2018-06-28 RX ADMIN — PANTOPRAZOLE SODIUM SCH MG: 40 GRANULE, DELAYED RELEASE ORAL at 16:55

## 2018-06-28 NOTE — CP.PCM.PN
<Apollo Sigala - Last Filed: 06/28/18 12:16>





Subjective





- Date & Time of Evaluation


Date of Evaluation: 06/28/18


Time of Evaluation: 12:10





- Subjective


Subjective: 


General Surgery Progress Note for Dr. Villalobos





This 72M was seen and evaluated this AM at bedside. Overnight he required one 

dose of toradol at 11pm and afterwards he slept through the night. He is 

tolerating diet, reports BM and flatus. He is ambulating. He denies dysuria, 

chest pain or SOB. 








Objective





- Vital Signs/Intake and Output


Vital Signs (last 24 hours): 


 











Temp Pulse Resp BP Pulse Ox


 


 97.7 F   77   20   122/80   98 


 


 06/28/18 07:50  06/28/18 07:50  06/28/18 07:50  06/28/18 07:50  06/28/18 07:50








Intake and Output: 


 











 06/28/18 06/28/18





 06:59 18:59


 


Intake Total 1470 


 


Output Total 400 


 


Balance 1070 














- Medications


Medications: 


 Current Medications





Acetaminophen (Tylenol 650mg/20.3ml Solution Ud)  650 mg PO Q6 PRN


   PRN Reason: Pain, Mild (1-3)


Fluconazole (Diflucan)  50 mg PO DAILY [facility]


   PRN Reason: Protocol


   Last Admin: 06/28/18 09:45 Dose:  50 mg


Heparin Sodium (Porcine) (Heparin)  5,000 units SC Q8 [facility]


   Last Admin: 06/28/18 06:04 Dose:  5,000 units


Piperacillin Sod/Tazobactam (Sod 3.375 gm/ Sodium Chloride)  100 mls @ 200 mls/

hr IVPB Q6H [facility]


   PRN Reason: Protocol


   Last Admin: 06/28/18 06:04 Dose:  200 mls/hr


Multivitamins/Vitamin C 10 ml/ (Amino Acids)  1,010 mls @ 63 mls/hr IV .Q16H2M 

[facility]


   Stop: 06/29/18 10:01


Amino Acids (Clinimix 4.25/5 % "E" (1000 Ml))  1,000 mls @ 63 mls/hr IV 

[id number] [facility]


   Stop: 06/29/18 17:59


Ketorolac Tromethamine (Toradol)  30 mg IVP Q6 PRN


   PRN Reason: Pain, moderate (4-7)


   Last Admin: 06/27/18 23:24 Dose:  30 mg


Lidocaine (Lidoderm)  1 ea TD DAILY [facility]


   Last Admin: 06/28/18 09:44 Dose:  1 ea


Pantoprazole Sodium (Protonix Susp)  40 mg PO 0600,1600 [facility]


   Last Admin: 06/28/18 06:05 Dose:  40 mg


Potassium Phos/Sodium Phos (Neutra-Phos)  1 pkt PO ONCE ONE


   Stop: 06/28/18 12:08











- Labs


Labs: 


 





 06/28/18 07:32 





 06/28/18 07:32 











- Constitutional


Appears: Non-toxic, No Acute Distress





- Head Exam


Head Exam: ATRAUMATIC, NORMOCEPHALIC





- Eye Exam


Eye Exam: EOMI





- ENT Exam


ENT Exam: Mucous Membranes Moist





- Respiratory Exam


Respiratory Exam: NORMAL BREATHING PATTERN





- Cardiovascular Exam


Cardiovascular Exam: +S1, +S2





- GI/Abdominal Exam


GI & Abdominal Exam: Soft, Tenderness.  absent: Distended, Firm, Guarding, Rigid


Additional comments: 


Midline wound with wound vac in place inadequate suction so it was changed this 

AM, now without any air leak. 








- Neurological Exam


Neurological Exam: Alert, Awake





- Psychiatric Exam


Psychiatric exam: Normal Affect, Normal Mood





- Skin


Skin Exam: Dry, Intact





Assessment and Plan





- Assessment and Plan (Free Text)


Assessment: 


72M came with wound dehiscence and fever s/p right hemicolectomy with re-

operation for leak/ES fistula/necrotic anastomosis POD#28





Wound vac changed this AM


Urine CX yeast started on diflucan today 


Continue Zosyn


Albumin 2.3 consult PPN, consult nutrition


Electrolytes replete


PO tylenol and IV toradol for pain


Continue PPI


Continue HSQ for DVT PPX


Will round on pt with Dr. Harris who will be covering 


D/W  Dr. Wilfredo Sigala PGY2


[phone]








<Jeff Harris B - Last Filed: 07/01/18 19:21>





Objective





- Vital Signs/Intake and Output


Vital Signs (last 24 hours): 


 











Temp Pulse Resp BP Pulse Ox


 


 98.2 F   67   20   115/77   95 


 


 07/01/18 15:00  07/01/18 15:00  07/01/18 15:00  07/01/18 15:00  07/01/18 15:00








Intake and Output: 


 











 07/01/18 07/02/18





 18:59 06:59


 


Intake Total 580 


 


Balance 580 














- Medications


Medications: 


 Current Medications





Acetaminophen (Tylenol 650mg/20.3ml Solution Ud)  650 mg PO Q6 PRN


   PRN Reason: Pain, Mild (1-3)


Albuterol/Ipratropium (Duoneb 3 Mg/0.5 Mg (3 Ml) Ud)  3 ml INH RQ2 PRN


   PRN Reason: Shortness of Breath


   Last Admin: 07/01/18 07:15 Dose:  3 ml


Docusate Sodium (Colace)  100 mg PO DAILY [facility]


   Last Admin: 07/01/18 10:06 Dose:  100 mg


Fluconazole (Diflucan)  50 mg PO DAILY [facility]


   PRN Reason: Protocol


   Last Admin: 07/01/18 10:06 Dose:  50 mg


Guaifenesin (Robitussin)  100 mg PO Q4H PRN


   PRN Reason: Cough


Heparin Sodium (Porcine) (Heparin)  5,000 units SC Q8 [facility]


   Last Admin: 07/01/18 15:00 Dose:  5,000 units


Piperacillin Sod/Tazobactam (Sod 3.375 gm/ Sodium Chloride)  100 mls @ 200 mls/

hr IVPB Q8H [facility]


   PRN Reason: Protocol


   Last Admin: 07/01/18 16:38 Dose:  200 mls/hr


Ketorolac Tromethamine (Toradol)  15 mg IVP Q6 PRN


   PRN Reason: Pain, severe (8-10)


   Last Admin: 06/30/18 11:41 Dose:  15 mg


Lidocaine (Lidoderm)  1 ea TD DAILY [facility]


   Last Admin: 07/01/18 10:06 Dose:  1 ea


Pantoprazole Sodium (Protonix Susp)  40 mg PO 0600,1600 [facility]


   Last Admin: 07/01/18 16:39 Dose:  40 mg


Tramadol HCl (Ultram)  50 mg PO TID PRN


   PRN Reason: Pain, Mild (1-3)


Zolpidem Tartrate (Ambien)  5 mg PO HS PRN


   PRN Reason: Insomnia











- Labs


Labs: 


 





 06/30/18 07:46 





 06/30/18 07:46 











Attending/Attestation





- Attestation


I have personally seen and examined this patient.: Yes


I have fully participated in the care of the patient.: Yes


I have reviewed all pertinent clinical information, including history, physical 

exam and plan: Yes


Notes (Text): 





Pt was seen and examined at bedside


Agree with above note and assessment


Pt with Postoperative wound infection with wound vac


Pt is tolerating diet


C/W IV antibiotics


High protein diet


c.w current mx


Plan d.w pt in detail


Risk and benefit explained in detail.

## 2018-06-29 LAB
ALBUMIN SERPL-MCNC: 2.5 G/DL (ref 3.5–5)
ALBUMIN/GLOB SERPL: 0.8 {RATIO} (ref 1–2.1)
ALT SERPL-CCNC: 25 U/L (ref 21–72)
AST SERPL-CCNC: 30 U/L (ref 17–59)
BASOPHILS # BLD AUTO: 0 K/UL (ref 0–0.2)
BASOPHILS NFR BLD: 0.5 % (ref 0–2)
BUN SERPL-MCNC: 8 MG/DL (ref 9–20)
CALCIUM SERPL-MCNC: 8.1 MG/DL (ref 8.6–10.4)
EOSINOPHIL # BLD AUTO: 0.3 K/UL (ref 0–0.7)
EOSINOPHIL NFR BLD: 6.2 % (ref 0–4)
ERYTHROCYTE [DISTWIDTH] IN BLOOD BY AUTOMATED COUNT: 14.8 % (ref 11.5–14.5)
GFR NON-AFRICAN AMERICAN: > 60
HGB BLD-MCNC: 10.2 G/DL (ref 12–18)
LYMPHOCYTES # BLD AUTO: 1.5 K/UL (ref 1–4.3)
LYMPHOCYTES NFR BLD AUTO: 28 % (ref 20–40)
MCH RBC QN AUTO: 28.1 PG (ref 27–31)
MCHC RBC AUTO-ENTMCNC: 33.7 G/DL (ref 33–37)
MCV RBC AUTO: 83.2 FL (ref 80–94)
MONOCYTES # BLD: 0.4 K/UL (ref 0–0.8)
MONOCYTES NFR BLD: 7.4 % (ref 0–10)
NEUTROPHILS # BLD: 3 K/UL (ref 1.8–7)
NEUTROPHILS NFR BLD AUTO: 57.9 % (ref 50–75)
NRBC BLD AUTO-RTO: 0.2 % (ref 0–2)
PLATELET # BLD: 344 K/UL (ref 130–400)
PMV BLD AUTO: 8.4 FL (ref 7.2–11.7)
RBC # BLD AUTO: 3.62 MIL/UL (ref 4.4–5.9)
WBC # BLD AUTO: 5.2 K/UL (ref 4.8–10.8)

## 2018-06-29 RX ADMIN — SODIUM CHLORIDE SCH MLS/HR: 9 INJECTION, SOLUTION INTRAVENOUS at 00:10

## 2018-06-29 RX ADMIN — PANTOPRAZOLE SODIUM SCH MG: 40 GRANULE, DELAYED RELEASE ORAL at 17:00

## 2018-06-29 RX ADMIN — SODIUM CHLORIDE SCH MLS/HR: 9 INJECTION, SOLUTION INTRAVENOUS at 17:00

## 2018-06-29 RX ADMIN — SODIUM CHLORIDE SCH MLS/HR: 9 INJECTION, SOLUTION INTRAVENOUS at 09:58

## 2018-06-29 RX ADMIN — PANTOPRAZOLE SODIUM SCH MG: 40 GRANULE, DELAYED RELEASE ORAL at 05:35

## 2018-06-30 LAB
ALBUMIN SERPL-MCNC: 2.6 G/DL (ref 3.5–5)
ALBUMIN/GLOB SERPL: 0.8 {RATIO} (ref 1–2.1)
ALT SERPL-CCNC: 30 U/L (ref 21–72)
AST SERPL-CCNC: 37 U/L (ref 17–59)
BASOPHILS # BLD AUTO: 0 K/UL (ref 0–0.2)
BASOPHILS NFR BLD: 0.7 % (ref 0–2)
BUN SERPL-MCNC: 7 MG/DL (ref 9–20)
CALCIUM SERPL-MCNC: 8.4 MG/DL (ref 8.6–10.4)
EOSINOPHIL # BLD AUTO: 0.2 K/UL (ref 0–0.7)
EOSINOPHIL NFR BLD: 5 % (ref 0–4)
ERYTHROCYTE [DISTWIDTH] IN BLOOD BY AUTOMATED COUNT: 15.1 % (ref 11.5–14.5)
GFR NON-AFRICAN AMERICAN: > 60
HGB BLD-MCNC: 10.3 G/DL (ref 12–18)
LYMPHOCYTES # BLD AUTO: 1.5 K/UL (ref 1–4.3)
LYMPHOCYTES NFR BLD AUTO: 31.7 % (ref 20–40)
MCH RBC QN AUTO: 27.8 PG (ref 27–31)
MCHC RBC AUTO-ENTMCNC: 33.7 G/DL (ref 33–37)
MCV RBC AUTO: 82.6 FL (ref 80–94)
MONOCYTES # BLD: 0.4 K/UL (ref 0–0.8)
MONOCYTES NFR BLD: 8 % (ref 0–10)
NEUTROPHILS # BLD: 2.6 K/UL (ref 1.8–7)
NEUTROPHILS NFR BLD AUTO: 54.6 % (ref 50–75)
NRBC BLD AUTO-RTO: 0.1 % (ref 0–2)
PLATELET # BLD: 364 K/UL (ref 130–400)
PMV BLD AUTO: 8.4 FL (ref 7.2–11.7)
RBC # BLD AUTO: 3.7 MIL/UL (ref 4.4–5.9)
WBC # BLD AUTO: 4.8 K/UL (ref 4.8–10.8)

## 2018-06-30 RX ADMIN — SODIUM CHLORIDE SCH MLS/HR: 9 INJECTION, SOLUTION INTRAVENOUS at 00:30

## 2018-06-30 RX ADMIN — SODIUM CHLORIDE SCH MLS/HR: 9 INJECTION, SOLUTION INTRAVENOUS at 16:21

## 2018-06-30 RX ADMIN — PANTOPRAZOLE SODIUM SCH MG: 40 GRANULE, DELAYED RELEASE ORAL at 05:38

## 2018-06-30 RX ADMIN — IPRATROPIUM BROMIDE AND ALBUTEROL SULFATE PRN ML: .5; 3 SOLUTION RESPIRATORY (INHALATION) at 11:30

## 2018-06-30 RX ADMIN — SODIUM CHLORIDE SCH MLS/HR: 9 INJECTION, SOLUTION INTRAVENOUS at 10:00

## 2018-06-30 RX ADMIN — PANTOPRAZOLE SODIUM SCH MG: 40 GRANULE, DELAYED RELEASE ORAL at 16:24

## 2018-07-01 RX ADMIN — IPRATROPIUM BROMIDE AND ALBUTEROL SULFATE PRN ML: .5; 3 SOLUTION RESPIRATORY (INHALATION) at 07:15

## 2018-07-01 RX ADMIN — PANTOPRAZOLE SODIUM SCH MG: 40 GRANULE, DELAYED RELEASE ORAL at 05:13

## 2018-07-01 RX ADMIN — SODIUM CHLORIDE SCH MLS/HR: 9 INJECTION, SOLUTION INTRAVENOUS at 10:00

## 2018-07-01 RX ADMIN — SODIUM CHLORIDE SCH MLS/HR: 9 INJECTION, SOLUTION INTRAVENOUS at 00:18

## 2018-07-01 RX ADMIN — SODIUM CHLORIDE SCH MLS/HR: 9 INJECTION, SOLUTION INTRAVENOUS at 16:38

## 2018-07-01 RX ADMIN — PANTOPRAZOLE SODIUM SCH MG: 40 GRANULE, DELAYED RELEASE ORAL at 16:39

## 2018-07-01 NOTE — CP.PCM.PN
Subjective





- Date & Time of Evaluation


Date of Evaluation: 06/29/18


Time of Evaluation: 06:15





- Subjective


Subjective: 


General Surgery Progress Note for Dr. Harris





This 72M was seen and evaluated this AM at bedside. No acute events Overnight. 

He is moving his bowels and ambulating with the aide of his wife. He denies any 

new or concerning symptoms. 





Objective





- Vital Signs/Intake and Output


Vital Signs (last 24 hours): 


 











Temp Pulse Resp BP Pulse Ox


 


 98.2 F   67   20   115/77   95 


 


 07/01/18 15:00  07/01/18 15:00  07/01/18 15:00  07/01/18 15:00  07/01/18 15:00








Intake and Output: 


 











 07/01/18 07/02/18





 18:59 06:59


 


Intake Total 580 


 


Balance 580 














- Medications


Medications: 


 Current Medications





Acetaminophen (Tylenol 650mg/20.3ml Solution Ud)  650 mg PO Q6 PRN


   PRN Reason: Pain, Mild (1-3)


Albuterol/Ipratropium (Duoneb 3 Mg/0.5 Mg (3 Ml) Ud)  3 ml INH RQ2 PRN


   PRN Reason: Shortness of Breath


   Last Admin: 07/01/18 07:15 Dose:  3 ml


Docusate Sodium (Colace)  100 mg PO DAILY [facility]


   Last Admin: 07/01/18 10:06 Dose:  100 mg


Fluconazole (Diflucan)  50 mg PO DAILY [facility]


   PRN Reason: Protocol


   Last Admin: 07/01/18 10:06 Dose:  50 mg


Guaifenesin (Robitussin)  100 mg PO Q4H PRN


   PRN Reason: Cough


Heparin Sodium (Porcine) (Heparin)  5,000 units SC Q8 [facility]


   Last Admin: 07/01/18 15:00 Dose:  5,000 units


Piperacillin Sod/Tazobactam (Sod 3.375 gm/ Sodium Chloride)  100 mls @ 200 mls/

hr IVPB Q8H [facility]


   PRN Reason: Protocol


   Last Admin: 07/01/18 16:38 Dose:  200 mls/hr


Ketorolac Tromethamine (Toradol)  15 mg IVP Q6 PRN


   PRN Reason: Pain, severe (8-10)


   Last Admin: 06/30/18 11:41 Dose:  15 mg


Lidocaine (Lidoderm)  1 ea TD DAILY [facility]


   Last Admin: 07/01/18 10:06 Dose:  1 ea


Pantoprazole Sodium (Protonix Susp)  40 mg PO 0600,1600 [facility]


   Last Admin: 07/01/18 16:39 Dose:  40 mg


Tramadol HCl (Ultram)  50 mg PO TID PRN


   PRN Reason: Pain, Mild (1-3)


Zolpidem Tartrate (Ambien)  5 mg PO HS PRN


   PRN Reason: Insomnia











- Labs


Labs: 


 





 06/30/18 07:46 





 06/30/18 07:46 





- Constitutional


Appears: Non-toxic, No Acute Distress





- Head Exam


Head Exam: ATRAUMATIC, NORMOCEPHALIC





- Eye Exam


Eye Exam: EOMI





- ENT Exam


ENT Exam: Mucous Membranes Moist





- Respiratory Exam


Respiratory Exam: NORMAL BREATHING PATTERN





- Cardiovascular Exam


Cardiovascular Exam: +S1, +S2





- GI/Abdominal Exam


GI & Abdominal Exam: Soft, Tenderness.  absent: Distended, Firm, Guarding, Rigid


Additional comments: 


Midline wound with wound vac in place with good suction 





- Neurological Exam


Neurological Exam: Alert, Awake





- Psychiatric Exam


Psychiatric exam: Normal Affect, Normal Mood





- Skin


Skin Exam: Dry, Intact





Assessment and Plan





- Assessment and Plan (Free Text)


Assessment: 


72M came with wound dehiscence and fever s/p right hemicolectomy with re-

operation for leak/ES fistula/necrotic anastomosis POD#29





Plan: 


Wound vac to suction 


Urine CX yeast diflucan day 1 of 14


Continue Zosyn


Continue regular diet supplemented with prostat


Electrolytes replete


PO tylenol and IV toradol for pain


Continue PPI


Continue HSQ for DVT PPX 


Ambulate


D/W  Dr. Kimberly Sigala PGY2


[phone]

## 2018-07-01 NOTE — CP.PCM.PN
<Apolol Sigala - Last Filed: 07/01/18 19:29>





Subjective





- Date & Time of Evaluation


Date of Evaluation: 06/29/18


Time of Evaluation: 19:30





- Subjective


Subjective: 


General Surgery Progress Note for Dr. Harris





This 72M was seen and evaluated this AM at bedside. No acute events Overnight. 

He is moving his bowels and ambulating with the aide of his wife. He denies any 

new or concerning symptoms. 











Objective





- Vital Signs/Intake and Output


Vital Signs (last 24 hours): 


 











Temp Pulse Resp BP Pulse Ox


 


 98.2 F   67   20   115/77   95 


 


 07/01/18 15:00  07/01/18 15:00  07/01/18 15:00  07/01/18 15:00  07/01/18 15:00








Intake and Output: 


 











 07/01/18 07/02/18





 18:59 06:59


 


Intake Total 580 


 


Balance 580 














- Medications


Medications: 


 Current Medications





Acetaminophen (Tylenol 650mg/20.3ml Solution Ud)  650 mg PO Q6 PRN


   PRN Reason: Pain, Mild (1-3)


Albuterol/Ipratropium (Duoneb 3 Mg/0.5 Mg (3 Ml) Ud)  3 ml INH RQ2 PRN


   PRN Reason: Shortness of Breath


   Last Admin: 07/01/18 07:15 Dose:  3 ml


Docusate Sodium (Colace)  100 mg PO DAILY [facility]


   Last Admin: 07/01/18 10:06 Dose:  100 mg


Fluconazole (Diflucan)  50 mg PO DAILY [facility]


   PRN Reason: Protocol


   Last Admin: 07/01/18 10:06 Dose:  50 mg


Guaifenesin (Robitussin)  100 mg PO Q4H PRN


   PRN Reason: Cough


Heparin Sodium (Porcine) (Heparin)  5,000 units SC Q8 [facility]


   Last Admin: 07/01/18 15:00 Dose:  5,000 units


Piperacillin Sod/Tazobactam (Sod 3.375 gm/ Sodium Chloride)  100 mls @ 200 mls/

hr IVPB Q8H [facility]


   PRN Reason: Protocol


   Last Admin: 07/01/18 16:38 Dose:  200 mls/hr


Ketorolac Tromethamine (Toradol)  15 mg IVP Q6 PRN


   PRN Reason: Pain, severe (8-10)


   Last Admin: 06/30/18 11:41 Dose:  15 mg


Lidocaine (Lidoderm)  1 ea TD DAILY [facility]


   Last Admin: 07/01/18 10:06 Dose:  1 ea


Pantoprazole Sodium (Protonix Susp)  40 mg PO 0600,1600 [facility]


   Last Admin: 07/01/18 16:39 Dose:  40 mg


Tramadol HCl (Ultram)  50 mg PO TID PRN


   PRN Reason: Pain, Mild (1-3)


Zolpidem Tartrate (Ambien)  5 mg PO HS PRN


   PRN Reason: Insomnia











- Labs


Labs: 


 





 06/30/18 07:46 





 06/30/18 07:46 





- Constitutional


Appears: Non-toxic, No Acute Distress





- Head Exam


Head Exam: ATRAUMATIC, NORMOCEPHALIC





- Eye Exam


Eye Exam: EOMI





- ENT Exam


ENT Exam: Mucous Membranes Moist





- Respiratory Exam


Respiratory Exam: NORMAL BREATHING PATTERN





- Cardiovascular Exam


Cardiovascular Exam: +S1, +S2





- GI/Abdominal Exam


GI & Abdominal Exam: Soft, Tenderness.  absent: Distended, Firm, Guarding, Rigid


Additional comments: 


Midline wound with wound vac in place with good suction 





- Neurological Exam


Neurological Exam: Alert, Awake





- Psychiatric Exam


Psychiatric exam: Normal Affect, Normal Mood





- Skin


Skin Exam: Dry, Intact








Assessment and Plan





- Assessment and Plan (Free Text)


Assessment: 





72M came with wound dehiscence and fever s/p right hemicolectomy with re-

operation for leak/ES fistula/necrotic anastomosis POD#29





Plan: 


Wound vac to suction 


Urine CX yeast diflucan day 1 of 14


Continue Zosyn


Continue regular diet supplemented with prostat


Electrolytes replete


PO tylenol and IV toradol for pain


Continue PPI


Continue HSQ for DVT PPX 


Ambulate


D/W  Dr. Kimberly Sigala PGY2


[phone]








<Jeff Harris - Last Filed: 07/01/18 20:51>





Objective





- Vital Signs/Intake and Output


Vital Signs (last 24 hours): 


 











Temp Pulse Resp BP Pulse Ox


 


 98.2 F   67   20   115/77   95 


 


 07/01/18 15:00  07/01/18 15:00  07/01/18 15:00  07/01/18 15:00  07/01/18 15:00








Intake and Output: 


 











 07/01/18 07/02/18





 18:59 06:59


 


Intake Total 580 


 


Balance 580 














- Medications


Medications: 


 Current Medications





Acetaminophen (Tylenol 650mg/20.3ml Solution Ud)  650 mg PO Q6 PRN


   PRN Reason: Pain, Mild (1-3)


Albuterol/Ipratropium (Duoneb 3 Mg/0.5 Mg (3 Ml) Ud)  3 ml INH RQ2 PRN


   PRN Reason: Shortness of Breath


   Last Admin: 07/01/18 07:15 Dose:  3 ml


Docusate Sodium (Colace)  100 mg PO DAILY [facility]


   Last Admin: 07/01/18 10:06 Dose:  100 mg


Fluconazole (Diflucan)  50 mg PO DAILY [facility]


   PRN Reason: Protocol


   Last Admin: 07/01/18 10:06 Dose:  50 mg


Guaifenesin (Robitussin)  100 mg PO Q4H PRN


   PRN Reason: Cough


Heparin Sodium (Porcine) (Heparin)  5,000 units SC Q8 [facility]


   Last Admin: 07/01/18 15:00 Dose:  5,000 units


Piperacillin Sod/Tazobactam (Sod 3.375 gm/ Sodium Chloride)  100 mls @ 200 mls/

hr IVPB Q8H [facility]


   PRN Reason: Protocol


   Last Admin: 07/01/18 16:38 Dose:  200 mls/hr


Ketorolac Tromethamine (Toradol)  15 mg IVP Q6 PRN


   PRN Reason: Pain, severe (8-10)


   Last Admin: 06/30/18 11:41 Dose:  15 mg


Lidocaine (Lidoderm)  1 ea TD DAILY [facility]


   Last Admin: 07/01/18 10:06 Dose:  1 ea


Pantoprazole Sodium (Protonix Susp)  40 mg PO 0600,1600 [facility]


   Last Admin: 07/01/18 16:39 Dose:  40 mg


Tramadol HCl (Ultram)  50 mg PO TID PRN


   PRN Reason: Pain, Mild (1-3)


Zolpidem Tartrate (Ambien)  5 mg PO HS PRN


   PRN Reason: Insomnia











- Labs


Labs: 


 





 06/30/18 07:46 





 06/30/18 07:46 











Attending/Attestation





- Attestation


I have personally seen and examined this patient.: Yes


I have fully participated in the care of the patient.: Yes


I have reviewed all pertinent clinical information, including history, physical 

exam and plan: Yes


Notes (Text): 





Pt was seen and examined at bedside


Agree with above note and assessment


Pt with wound vac


Improving clinically


High protein diet


DC TPN


C/W IV antibiotics


Plan d.w pt in detail

## 2018-07-01 NOTE — CP.PCM.PN
<Apollo Sigala - Last Filed: 07/01/18 08:41>





Subjective





- Date & Time of Evaluation


Date of Evaluation: 07/01/18


Time of Evaluation: 08:41





- Subjective


Subjective: 


General Surgery Progress Note for Dr. Villalobos





This 72M was seen and evaluated this AM at bedside. Overnight he required no 

pain medication overnight however he is complaining of trouble sleeping. He is 

tolerating diet, reports BM and flatus. He is ambulating. He  chest pain or 

SOB. 








Objective





- Vital Signs/Intake and Output


Vital Signs (last 24 hours): 


 











Temp Pulse Resp BP Pulse Ox


 


 98.2 F   80   20   123/75   96 


 


 07/01/18 00:00  07/01/18 00:00  07/01/18 00:00  07/01/18 00:00  07/01/18 00:00








Intake and Output: 


 











 07/01/18 07/01/18





 06:59 18:59


 


Intake Total 850 


 


Balance 850 














- Medications


Medications: 


 Current Medications





Acetaminophen (Tylenol 650mg/20.3ml Solution Ud)  650 mg PO Q6 PRN


   PRN Reason: Pain, Mild (1-3)


Albuterol/Ipratropium (Duoneb 3 Mg/0.5 Mg (3 Ml) Ud)  3 ml INH RQ2 PRN


   PRN Reason: Shortness of Breath


   Last Admin: 07/01/18 07:15 Dose:  3 ml


Docusate Sodium (Colace)  100 mg PO DAILY [facility]


   Last Admin: 06/30/18 10:32 Dose:  100 mg


Fluconazole (Diflucan)  50 mg PO DAILY [facility]


   PRN Reason: Protocol


   Last Admin: 06/30/18 10:32 Dose:  50 mg


Guaifenesin (Robitussin)  100 mg PO Q4H PRN


   PRN Reason: Cough


Heparin Sodium (Porcine) (Heparin)  5,000 units SC Q8 [facility]


   Last Admin: 07/01/18 05:13 Dose:  5,000 units


Piperacillin Sod/Tazobactam (Sod 3.375 gm/ Sodium Chloride)  100 mls @ 200 mls/

hr IVPB Q8H [facility]


   PRN Reason: Protocol


   Last Admin: 07/01/18 00:18 Dose:  200 mls/hr


Ketorolac Tromethamine (Toradol)  15 mg IVP Q6 PRN


   PRN Reason: Pain, severe (8-10)


   Last Admin: 06/30/18 11:41 Dose:  15 mg


Lidocaine (Lidoderm)  1 ea TD DAILY [facility]


   Last Admin: 06/30/18 10:31 Dose:  1 ea


Pantoprazole Sodium (Protonix Susp)  40 mg PO 0600,1600 [facility]


   Last Admin: 07/01/18 05:13 Dose:  40 mg


Tramadol HCl (Ultram)  50 mg PO TID PRN


   PRN Reason: Pain, Mild (1-3)


Zolpidem Tartrate (Ambien)  5 mg PO HS PRN


   PRN Reason: Insomnia











- Labs


Labs: 


 





 06/30/18 07:46 





 06/30/18 07:46 





- Constitutional


Appears: Non-toxic, No Acute Distress





- Head Exam


Head Exam: ATRAUMATIC, NORMOCEPHALIC





- Eye Exam


Eye Exam: EOMI





- ENT Exam


ENT Exam: Mucous Membranes Moist





- Respiratory Exam


Respiratory Exam: NORMAL BREATHING PATTERN





- Cardiovascular Exam


Cardiovascular Exam: +S1, +S2





- GI/Abdominal Exam


GI & Abdominal Exam: Soft, Tenderness.  absent: Distended, Firm, Guarding, Rigid


Additional comments: 


Midline wound with wound vac in place superior sponge compressed distal sponge 

appears to have inadequate suction. 





- Neurological Exam


Neurological Exam: Alert, Awake





- Psychiatric Exam


Psychiatric exam: Normal Affect, Normal Mood





- Skin


Skin Exam: Dry, Intact





Assessment and Plan





- Assessment and Plan (Free Text)


Assessment: 


72M came with wound dehiscence and fever s/p right hemicolectomy with re-

operation for leak/ES fistula/necrotic anastomosis POD#30





Plan: 


Change wound vac today 


Urine Yeast Diflucan day 3 of 14 


Continue Zosyn day 5


Heart healthy diet supplemented by prostat


PO tylenol and IV toradol for pain


Continue PPI


Continue HSQ for DVT PPX


D/W  Dr. Kimberly Sigala PGY2


[phone]








<Jeff Harris - Last Filed: 07/01/18 19:21>





Objective





- Vital Signs/Intake and Output


Vital Signs (last 24 hours): 


 











Temp Pulse Resp BP Pulse Ox


 


 98.2 F   67   20   115/77   95 


 


 07/01/18 15:00  07/01/18 15:00  07/01/18 15:00  07/01/18 15:00  07/01/18 15:00








Intake and Output: 


 











 07/01/18 07/02/18





 18:59 06:59


 


Intake Total 580 


 


Balance 580 














- Medications


Medications: 


 Current Medications





Acetaminophen (Tylenol 650mg/20.3ml Solution Ud)  650 mg PO Q6 PRN


   PRN Reason: Pain, Mild (1-3)


Albuterol/Ipratropium (Duoneb 3 Mg/0.5 Mg (3 Ml) Ud)  3 ml INH RQ2 PRN


   PRN Reason: Shortness of Breath


   Last Admin: 07/01/18 07:15 Dose:  3 ml


Docusate Sodium (Colace)  100 mg PO DAILY [facility]


   Last Admin: 07/01/18 10:06 Dose:  100 mg


Fluconazole (Diflucan)  50 mg PO DAILY [facility]


   PRN Reason: Protocol


   Last Admin: 07/01/18 10:06 Dose:  50 mg


Guaifenesin (Robitussin)  100 mg PO Q4H PRN


   PRN Reason: Cough


Heparin Sodium (Porcine) (Heparin)  5,000 units SC Q8 [facility]


   Last Admin: 07/01/18 15:00 Dose:  5,000 units


Piperacillin Sod/Tazobactam (Sod 3.375 gm/ Sodium Chloride)  100 mls @ 200 mls/

hr IVPB Q8H [facility]


   PRN Reason: Protocol


   Last Admin: 07/01/18 16:38 Dose:  200 mls/hr


Ketorolac Tromethamine (Toradol)  15 mg IVP Q6 PRN


   PRN Reason: Pain, severe (8-10)


   Last Admin: 06/30/18 11:41 Dose:  15 mg


Lidocaine (Lidoderm)  1 ea TD DAILY [facility]


   Last Admin: 07/01/18 10:06 Dose:  1 ea


Pantoprazole Sodium (Protonix Susp)  40 mg PO 0600,1600 [facility]


   Last Admin: 07/01/18 16:39 Dose:  40 mg


Tramadol HCl (Ultram)  50 mg PO TID PRN


   PRN Reason: Pain, Mild (1-3)


Zolpidem Tartrate (Ambien)  5 mg PO HS PRN


   PRN Reason: Insomnia











- Labs


Labs: 


 





 06/30/18 07:46 





 06/30/18 07:46 











Attending/Attestation





- Attestation


I have fully participated in the care of the patient.: Yes


I have reviewed all pertinent clinical information, including history, physical 

exam and plan: Yes


Notes (Text): 





Pt with Postoperative wound infection with wound vac


Pt is tolerating diet


C/W IV antibiotics


High protein diet


c.w current mx


Plan d.w pt in detail

## 2018-07-01 NOTE — CP.PCM.PN
<Apollo Sigala - Last Filed: 07/01/18 19:19>





Subjective





- Date & Time of Evaluation


Date of Evaluation: 06/30/18


Time of Evaluation: 06:00





- Subjective


Subjective: 


General Surgery Progress Note for Dr. Villalobos





This 72M was seen and evaluated this AM at bedside. He is tolerating diet, 

reports BM and flatus. He is ambulating. He denies pain however complained of 

polyuria we discussed his positive urin culture and treatment plan. No new 

complaints at this time such as chest pain or SOB.  








Objective





- Vital Signs/Intake and Output


Vital Signs (last 24 hours): 


 











Temp Pulse Resp BP Pulse Ox


 


 98.2 F   67   20   115/77   95 


 


 07/01/18 15:00  07/01/18 15:00  07/01/18 15:00  07/01/18 15:00  07/01/18 15:00








Intake and Output: 


 











 07/01/18 07/02/18





 18:59 06:59


 


Intake Total 580 


 


Balance 580 














- Medications


Medications: 


 Current Medications





Acetaminophen (Tylenol 650mg/20.3ml Solution Ud)  650 mg PO Q6 PRN


   PRN Reason: Pain, Mild (1-3)


Albuterol/Ipratropium (Duoneb 3 Mg/0.5 Mg (3 Ml) Ud)  3 ml INH RQ2 PRN


   PRN Reason: Shortness of Breath


   Last Admin: 07/01/18 07:15 Dose:  3 ml


Docusate Sodium (Colace)  100 mg PO DAILY [facility]


   Last Admin: 07/01/18 10:06 Dose:  100 mg


Fluconazole (Diflucan)  50 mg PO DAILY [facility]


   PRN Reason: Protocol


   Last Admin: 07/01/18 10:06 Dose:  50 mg


Guaifenesin (Robitussin)  100 mg PO Q4H PRN


   PRN Reason: Cough


Heparin Sodium (Porcine) (Heparin)  5,000 units SC Q8 [facility]


   Last Admin: 07/01/18 15:00 Dose:  5,000 units


Piperacillin Sod/Tazobactam (Sod 3.375 gm/ Sodium Chloride)  100 mls @ 200 mls/

hr IVPB Q8H [facility]


   PRN Reason: Protocol


   Last Admin: 07/01/18 16:38 Dose:  200 mls/hr


Ketorolac Tromethamine (Toradol)  15 mg IVP Q6 PRN


   PRN Reason: Pain, severe (8-10)


   Last Admin: 06/30/18 11:41 Dose:  15 mg


Lidocaine (Lidoderm)  1 ea TD DAILY [facility]


   Last Admin: 07/01/18 10:06 Dose:  1 ea


Pantoprazole Sodium (Protonix Susp)  40 mg PO 0600,1600 [facility]


   Last Admin: 07/01/18 16:39 Dose:  40 mg


Tramadol HCl (Ultram)  50 mg PO TID PRN


   PRN Reason: Pain, Mild (1-3)


Zolpidem Tartrate (Ambien)  5 mg PO HS PRN


   PRN Reason: Insomnia











- Labs


Labs: 


 





 06/30/18 07:46 





 06/30/18 07:46 





- Constitutional


Appears: Non-toxic, No Acute Distress





- Head Exam


Head Exam: ATRAUMATIC, NORMOCEPHALIC





- Eye Exam


Eye Exam: EOMI





- ENT Exam


ENT Exam: Mucous Membranes Moist





- Respiratory Exam


Respiratory Exam: NORMAL BREATHING PATTERN





- Cardiovascular Exam


Cardiovascular Exam: +S1, +S2





- GI/Abdominal Exam


GI & Abdominal Exam: Soft, Tenderness.  absent: Distended, Firm, Guarding, Rigid


Additional comments: 


Midline wound with wound vac in place on suction with good seal. 








- Neurological Exam


Neurological Exam: Alert, Awake





- Psychiatric Exam


Psychiatric exam: Normal Affect, Normal Mood





- Skin


Skin Exam: Dry, Intact





Assessment and Plan





- Assessment and Plan (Free Text)


Assessment: 


72M came with wound dehiscence and fever s/p right hemicolectomy with re-

operation for leak/ES fistula/necrotic anastomosis POD#28





Plan: 








Wound vac change tomorrow AM


Urine CX Diflucan day 2 of 14


Continue Zosyn


Electrolytes replete


PO tylenol and IV toradol for pain


Continue PPI


Continue HSQ for DVT PPX


D/W  Dr. Kimberly Sigala PGY2


[phone]








<Jeff Harris - Last Filed: 07/01/18 20:50>





Objective





- Vital Signs/Intake and Output


Vital Signs (last 24 hours): 


 











Temp Pulse Resp BP Pulse Ox


 


 98.2 F   67   20   115/77   95 


 


 07/01/18 15:00  07/01/18 15:00  07/01/18 15:00  07/01/18 15:00  07/01/18 15:00








Intake and Output: 


 











 07/01/18 07/02/18





 18:59 06:59


 


Intake Total 580 


 


Balance 580 














- Medications


Medications: 


 Current Medications





Acetaminophen (Tylenol 650mg/20.3ml Solution Ud)  650 mg PO Q6 PRN


   PRN Reason: Pain, Mild (1-3)


Albuterol/Ipratropium (Duoneb 3 Mg/0.5 Mg (3 Ml) Ud)  3 ml INH RQ2 PRN


   PRN Reason: Shortness of Breath


   Last Admin: 07/01/18 07:15 Dose:  3 ml


Docusate Sodium (Colace)  100 mg PO DAILY [facility]


   Last Admin: 07/01/18 10:06 Dose:  100 mg


Fluconazole (Diflucan)  50 mg PO DAILY [facility]


   PRN Reason: Protocol


   Last Admin: 07/01/18 10:06 Dose:  50 mg


Guaifenesin (Robitussin)  100 mg PO Q4H PRN


   PRN Reason: Cough


Heparin Sodium (Porcine) (Heparin)  5,000 units SC Q8 [facility]


   Last Admin: 07/01/18 15:00 Dose:  5,000 units


Piperacillin Sod/Tazobactam (Sod 3.375 gm/ Sodium Chloride)  100 mls @ 200 mls/

hr IVPB Q8H [facility]


   PRN Reason: Protocol


   Last Admin: 07/01/18 16:38 Dose:  200 mls/hr


Ketorolac Tromethamine (Toradol)  15 mg IVP Q6 PRN


   PRN Reason: Pain, severe (8-10)


   Last Admin: 06/30/18 11:41 Dose:  15 mg


Lidocaine (Lidoderm)  1 ea TD DAILY [facility]


   Last Admin: 07/01/18 10:06 Dose:  1 ea


Pantoprazole Sodium (Protonix Susp)  40 mg PO 0600,1600 [facility]


   Last Admin: 07/01/18 16:39 Dose:  40 mg


Tramadol HCl (Ultram)  50 mg PO TID PRN


   PRN Reason: Pain, Mild (1-3)


Zolpidem Tartrate (Ambien)  5 mg PO HS PRN


   PRN Reason: Insomnia











- Labs


Labs: 


 





 06/30/18 07:46 





 06/30/18 07:46 











Attending/Attestation





- Attestation


I have personally seen and examined this patient.: Yes


I have fully participated in the care of the patient.: Yes


I have reviewed all pertinent clinical information, including history, physical 

exam and plan: Yes


Notes (Text): 





Pt was seen and examined at bedside


Agree with above note and assessment


Pt is improving clinically


High protein diet


Wound vac change tomorrow


C/W IV antibiotics


Plan d.w pt in detail

## 2018-07-02 RX ADMIN — PANTOPRAZOLE SODIUM SCH MG: 40 GRANULE, DELAYED RELEASE ORAL at 05:22

## 2018-07-02 RX ADMIN — IPRATROPIUM BROMIDE AND ALBUTEROL SULFATE PRN ML: .5; 3 SOLUTION RESPIRATORY (INHALATION) at 12:00

## 2018-07-02 RX ADMIN — SODIUM CHLORIDE SCH MLS/HR: 9 INJECTION, SOLUTION INTRAVENOUS at 00:39

## 2018-07-02 RX ADMIN — SODIUM CHLORIDE SCH MLS/HR: 9 INJECTION, SOLUTION INTRAVENOUS at 09:11

## 2018-07-02 RX ADMIN — IPRATROPIUM BROMIDE AND ALBUTEROL SULFATE PRN ML: .5; 3 SOLUTION RESPIRATORY (INHALATION) at 07:21

## 2018-07-02 NOTE — CP.PCM.PN
Subjective





- Date & Time of Evaluation


Date of Evaluation: 07/02/18


Time of Evaluation: 06:30





- Subjective


Subjective: 





General Surgery Progress Note for Dr. Villalobos





73 yo M was seen and evaluated this AM at bedside. Overnight he required no 

pain medication. He is tolerating diet, reports BM and flatus. He is 

ambulating. 





Objective





- Vital Signs/Intake and Output


Vital Signs (last 24 hours): 


 











Temp Pulse Resp BP Pulse Ox


 


 98.0 F   93 H  20   114/70   95 


 


 07/02/18 07:18  07/02/18 07:18  07/02/18 07:18  07/02/18 07:18  07/02/18 07:18








Intake and Output: 


 











 07/02/18 07/02/18





 06:59 18:59


 


Intake Total 460 1540


 


Balance 460 1540














- Medications


Medications: 


 Current Medications





Acetaminophen (Tylenol 650mg/20.3ml Solution Ud)  650 mg PO Q6 PRN


   PRN Reason: Pain, Mild (1-3)


Albuterol/Ipratropium (Duoneb 3 Mg/0.5 Mg (3 Ml) Ud)  3 ml INH RQ2 PRN


   PRN Reason: Shortness of Breath


   Last Admin: 07/02/18 07:21 Dose:  3 ml


Docusate Sodium (Colace)  100 mg PO DAILY [facility]


   Last Admin: 07/02/18 09:10 Dose:  100 mg


Fluconazole (Diflucan)  50 mg PO DAILY [facility]


   PRN Reason: Protocol


   Last Admin: 07/02/18 09:10 Dose:  50 mg


Guaifenesin (Robitussin)  100 mg PO Q4H PRN


   PRN Reason: Cough


Heparin Sodium (Porcine) (Heparin)  5,000 units SC Q8 [facility]


   Last Admin: 07/02/18 05:21 Dose:  5,000 units


Lidocaine (Lidoderm)  1 ea TD DAILY [facility]


   Last Admin: 07/02/18 09:10 Dose:  1 ea


Pantoprazole Sodium (Protonix Susp)  40 mg PO 0600,1600 [facility]


   Last Admin: 07/02/18 05:22 Dose:  40 mg


Tramadol HCl (Ultram)  50 mg PO TID PRN


   PRN Reason: Pain, Mild (1-3)


Zolpidem Tartrate (Ambien)  5 mg PO HS PRN


   PRN Reason: Insomnia











- Labs


Labs: 


 





 06/30/18 07:46 





 06/30/18 07:46 











- Constitutional


Appears: Well





- Head Exam


Head Exam: ATRAUMATIC, NORMAL INSPECTION, NORMOCEPHALIC





- Eye Exam


Eye Exam: EOMI, Normal appearance, PERRL





- ENT Exam


ENT Exam: Mucous Membranes Moist, Normal Exam





- Neck Exam


Neck Exam: Full ROM, Normal Inspection.  absent: Lymphadenopathy





- Respiratory Exam


Respiratory Exam: Clear to Ausculation Bilateral, NORMAL BREATHING PATTERN





- Cardiovascular Exam


Cardiovascular Exam: REGULAR RHYTHM, +S1, +S2.  absent: Murmur





- GI/Abdominal Exam


GI & Abdominal Exam: Soft, Normal Bowel Sounds.  absent: Firm, Guarding, Rigid, 

Tenderness


Additional comments: 





Midline wound with wound vac in place with adequate suction. 





- Extremities Exam


Extremities Exam: Full ROM, Normal Capillary Refill, Normal Inspection.  absent

: Joint Swelling, Pedal Edema





- Neurological Exam


Neurological Exam: Alert, Awake, CN II-XII Intact, Normal Gait, Oriented x3





Assessment and Plan





- Assessment and Plan (Free Text)


Assessment: 





72M came with wound dehiscence and fever s/p right hemicolectomy with re-

operation for leak/EC fistula/necrotic anastomosis POD#32


Plan: 





Change wound vac yesterday. Will change every 3 days.


Urine Yeast Diflucan day 4 of 14 


DC Zosyn 


Heart healthy diet supplemented by prostat


PO tylenol for pain


Continue HSQ for DVT PPX


Patient received portable wound vac and will D/W Dr. Harris about further 

care instructions/discharge planning. 


Blas Carroll PGY-1


[phone]

## 2018-07-03 VITALS — TEMPERATURE: 98.3 F | SYSTOLIC BLOOD PRESSURE: 113 MMHG | HEART RATE: 85 BPM | DIASTOLIC BLOOD PRESSURE: 75 MMHG

## 2018-07-03 VITALS — OXYGEN SATURATION: 95 %

## 2018-07-03 NOTE — CP.PCM.DIS
Provider





- Provider


Date of Admission: 


06/26/18 00:22





Attending physician: 


Jas Villalobos MD





Time Spent in preparation of Discharge (in minutes): 20





Hospital Course





- Lab Results


Lab Results: 


 Micro Results





06/25/18 20:00   Blood   Blood Culture - Final


                            NO GROWTH AFTER 5 DAYS


06/25/18 20:00   Blood   Gram Stain - Final


                            TEST NOT PERFORMED


06/25/18 19:30   Blood   Blood Culture - Final


                            NO GROWTH AFTER 5 DAYS


06/25/18 19:30   Blood   Gram Stain - Final


                            TEST NOT PERFORMED


06/27/18 14:18   Urine   Urine Culture - Final


                            No Growth (<1,000 CFU/ML)


06/25/18 21:34   Urine   Urine Culture - Final


                            Yeast Species





 Most Recent Lab Values











WBC  4.8 K/uL (4.8-10.8)   06/30/18  07:46    


 


RBC  3.70 Mil/uL (4.40-5.90)  L  06/30/18  07:46    


 


Hgb  10.3 g/dL (12.0-18.0)  L  06/30/18  07:46    


 


Hct  30.6 % (35.0-51.0)  L  06/30/18  07:46    


 


MCV  82.6 fL (80.0-94.0)   06/30/18  07:46    


 


MCH  27.8 pg (27.0-31.0)   06/30/18  07:46    


 


MCHC  33.7 g/dL (33.0-37.0)   06/30/18  07:46    


 


RDW  15.1 % (11.5-14.5)  H  06/30/18  07:46    


 


Plt Count  364 K/uL (130-400)   06/30/18  07:46    


 


MPV  8.4 fL (7.2-11.7)   06/30/18  07:46    


 


Neut % (Auto)  54.6 % (50.0-75.0)   06/30/18  07:46    


 


Lymph % (Auto)  31.7 % (20.0-40.0)   06/30/18  07:46    


 


Mono % (Auto)  8.0 % (0.0-10.0)   06/30/18  07:46    


 


Eos % (Auto)  5.0 % (0.0-4.0)  H  06/30/18  07:46    


 


Baso % (Auto)  0.7 % (0.0-2.0)   06/30/18  07:46    


 


Neut # (Auto)  2.6 K/uL (1.8-7.0)   06/30/18  07:46    


 


Lymph # (Auto)  1.5 K/uL (1.0-4.3)   06/30/18  07:46    


 


Mono # (Auto)  0.4 K/uL (0.0-0.8)   06/30/18  07:46    


 


Eos # (Auto)  0.2 K/uL (0.0-0.7)   06/30/18  07:46    


 


Baso # (Auto)  0.0 K/uL (0.0-0.2)   06/30/18  07:46    


 


Sodium  143 mmol/L (132-148)   06/30/18  07:46    


 


Potassium  3.8 mmol/L (3.6-5.2)   06/30/18  07:46    


 


Chloride  107 mmol/L ()   06/30/18  07:46    


 


Carbon Dioxide  29 mmol/L (22-30)   06/30/18  07:46    


 


Anion Gap  11  (10-20)   06/30/18  07:46    


 


BUN  7 mg/dL (9-20)  L  06/30/18  07:46    


 


Creatinine  0.9 mg/dL (0.8-1.5)   06/30/18  07:46    


 


Est GFR ( Amer)  > 60   06/30/18  07:46    


 


Est GFR (Non-Af Amer)  > 60   06/30/18  07:46    


 


Random Glucose  110 mg/dL ()   06/30/18  07:46    


 


Calcium  8.4 mg/dl (8.6-10.4)  L  06/30/18  07:46    


 


Phosphorus  2.9 mg/dL (2.5-4.5)   06/30/18  07:46    


 


Magnesium  2.2 mg/dL (1.6-2.3)   06/30/18  07:46    


 


Total Bilirubin  0.5 mg/dL (0.2-1.3)   06/30/18  07:46    


 


AST  37 U/L (17-59)   06/30/18  07:46    


 


ALT  30 U/L (21-72)   06/30/18  07:46    


 


Alkaline Phosphatase  217 U/L ()  H  06/30/18  07:46    


 


Total Protein  5.9 g/dL (6.3-8.3)  L  06/30/18  07:46    


 


Albumin  2.6 g/dL (3.5-5.0)  L  06/30/18  07:46    


 


Globulin  3.2 gm/dL (2.2-3.9)   06/30/18  07:46    


 


Albumin/Globulin Ratio  0.8  (1.0-2.1)  L  06/30/18  07:46    


 


Lipase  201 U/L ()   06/25/18  17:27    


 


Urine Color  Yellow  (YELLOW)   06/25/18  17:29    


 


Urine Clarity  Clear  (Clear)   06/25/18  17:29    


 


Urine pH  5.0  (5.0-8.0)   06/25/18  17:29    


 


Ur Specific Gravity  1.014  (1.003-1.030)   06/25/18  17:29    


 


Urine Protein  Negative mg/dL (NEGATIVE)   06/25/18  17:29    


 


Urine Glucose (UA)  Normal mg/dL (Normal)   06/25/18  17:29    


 


Urine Ketones  Negative mg/dL (NEGATIVE)   06/25/18  17:29    


 


Urine Blood  Negative  (NEGATIVE)   06/25/18  17:29    


 


Urine Nitrate  Negative  (NEGATIVE)   06/25/18  17:29    


 


Urine Bilirubin  Negative  (NEGATIVE)   06/25/18  17:29    


 


Urine Urobilinogen  Normal mg/dL (0.2-1.0)   06/25/18  17:29    


 


Ur Leukocyte Esterase  2+ Corrine/uL (Negative)  H  06/25/18  17:29    


 


Urine WBC (Auto)  16 /hpf (0-5)  H  06/25/18  17:29    


 


Urine RBC (Auto)  3 /hpf (0-3)   06/25/18  17:29    


 


Ur Squamous Epith Cells  2 /hpf (0-5)   06/25/18  17:29    


 


Urine Bacteria  Few  (<OCC)  H  06/25/18  17:29    














- Hospital Course


Hospital Course: 





72M s/p right hemicolectomy with re-operation for leak/EC fistula/necrotic 

anastomosis, presented to the ED with a cough and fever on 6/25/18. Wound 

dehiscence was visualized. Patient had wound vac that was being changed every 3 

days, which was continued during hospital stay. Pt was started on antibiotics 

for infection and antifungals after positive urine cultures for yeast. Pts pain 

was controlled, electrolytes repleted, and diet advanced to heart healthy with 

prostat as tolerated. Pt was able to receive a portable wound vac. Taught pt 

and family how to use the portable wound vac. They will return to the fast 

track ED every 3 days to change the wound vac. 





Above is a brief summary of the patient's stay. Please refer to medical records 

for detailed summary of patient's stay. 





Discharge Exam





- Head Exam


Head Exam: ATRAUMATIC, NORMAL INSPECTION, NORMOCEPHALIC





- Eye Exam


Eye Exam: EOMI, Normal appearance, PERRL





- Neck Exam


Neck exam: Normal Inspection





- Respiratory Exam


Respiratory Exam: absent: Accessory Muscle Use, Chest Wall Tenderness, 

Respiratory Distress





- Cardiovascular Exam


Cardiovascular Exam: REGULAR RHYTHM, +S1, +S2





- GI/Abdominal Exam


GI & Abdominal Exam: Normal Bowel Sounds.  absent: Diminished Bowel Sounds, 

Distended, Guarding





- Skin


Skin Exam: Dry, Intact, Normal Color, Warm


Additional comments: 





Wound vac in place over midline incision. No leakage from site. Sealed. 





Discharge Plan





- Discharge Medications


Prescriptions: 


Fluconazole [Diflucan] 50 mg PO DAILY #9 tab





- Follow Up Plan


Condition: FAIR


Disposition: HOME/ ROUTINE


Instructions:  Acute Abdomen (Belly Pain), Adult (DC), Postoperative Pain (DC)


Referrals: 


Jas Villalobos MD [Staff Provider] - Patient

## 2018-07-05 ENCOUNTER — HOSPITAL ENCOUNTER (EMERGENCY)
Dept: HOSPITAL 31 - C.ER | Age: 73
Discharge: HOME | End: 2018-07-05
Payer: COMMERCIAL

## 2018-07-05 VITALS — RESPIRATION RATE: 14 BRPM | HEART RATE: 80 BPM | OXYGEN SATURATION: 99 %

## 2018-07-05 VITALS — BODY MASS INDEX: 33.2 KG/M2

## 2018-07-05 VITALS — SYSTOLIC BLOOD PRESSURE: 120 MMHG | DIASTOLIC BLOOD PRESSURE: 80 MMHG | TEMPERATURE: 98.6 F

## 2018-07-05 DIAGNOSIS — Z48.89: Primary | ICD-10-CM

## 2018-07-05 NOTE — C.PDOC
History Of Present Illness


73 y/o male presents to ED for dressing a wound change for his dehiscence 

surgical wound after right hemicolectomy. Denies any other physical complaints. 


Time Seen by Provider: 07/05/18 19:20


Chief Complaint (Nursing): Abnormal Skin Integrity


History Per: Patient


History/Exam Limitations: no limitations


Onset/Duration Of Symptoms: Hrs


Current Symptoms Are (Timing): Still Present


Location Of Injury: Anterior: Abdomen


Quality Of Symptoms: denies: Itching, Swollen


Severity: Moderate


Pain Scale Rating Of: 4


Recent travel outside of the [place]: No





Past Medical History


Reviewed: Historical Data, Nursing Documentation, Vital Signs


Vital Signs: 


 Last Vital Signs











Temp  98.6 F   07/05/18 18:43


 


Pulse  96 H  07/05/18 18:43


 


Resp  18   07/05/18 18:43


 


BP  120/80   07/05/18 18:43


 


Pulse Ox  98   07/05/18 19:25














- Medical History


PMH: Arthritis, Colonic Polyps, Deep Vein Thrombosis ("one year ago" as per 

patient), Gastritis, Sleep Apnea (POSITIVE STUDY (last year))


Surgical History: Endoscopy





- [facility] Procedures








 (05/31/18)


DRAINAGE OF ABD WALL WITH DRAIN DEV, PERC ENDO APPROACH (05/31/18)


EXCISION OF ABD SUBCU/FASCIA, OPEN APPROACH (05/31/18)


EXCISION OF LARGE INTESTINE, OPEN APPROACH (05/31/18)


INTRODUCTION OF NUTRITIONAL INTO PERIPH VEIN, PERC APPROACH (05/31/18)


REPAIR MESENTERY, PERCUTANEOUS ENDOSCOPIC APPROACH (05/31/18)


RESECTION OF RIGHT LARGE INTESTINE, PERC ENDO APPROACH (05/31/18)








Family History: States: Unknown Family Hx





- Social History


Hx Alcohol Use: No


Hx Substance Use: No





- Immunization History


Hx Tetanus Toxoid Vaccination: No


Hx Influenza Vaccination: No


Hx Pneumococcal Vaccination: Yes





Review Of Systems


Constitutional: Negative for: Fever, Chills


Gastrointestinal: Negative for: Nausea, Vomiting, Abdominal Pain, Diarrhea


Genitourinary: Positive for: Other (Dressing wound change for dehiscence 

surgical wound after right hemicolectomy)


Skin: Negative for: Rash


Neurological: Negative for: Weakness, Numbness


Psych: Negative for: Anxiety





Physical Exam





- Physical Exam


Appears: Non-toxic, No Acute Distress


Skin: Warm, Dry


Head: Normacephalic


Eye(s): bilateral: Normal Inspection


Oral Mucosa: Moist


Neck: Supple


Chest: Symmetrical


Cardiovascular: Rhythm Regular


Respiratory: No Rales, No Rhonchi, No Wheezing


Gastrointestinal/Abdominal: Soft, No Tenderness, Other (2 Abdominal wounds, 

each wound with granulation tissue, no foreign material visualized, no 

tenderness)


Extremity: Normal ROM


Extremity: Bilateral: Atraumatic, Normal Color And Temperature, Normal ROM


Neurological/Psych: Oriented x3, Normal Speech


Gait: Steady





ED Course And Treatment


O2 Sat by Pulse Oximetry: 98 (RA)


Pulse Ox Interpretation: Normal


Progress Note: Surgical resident at bed-side reapplied wound VAC.  Provided 

patient with wound care instructions.





Disposition


Counseled Patient/Family Regarding: Studies Performed, Diagnosis, Need For 

Followup





- Disposition


Referrals: 


Jeff Harris MD [Staff Provider] - 


Disposition: HOME/ ROUTINE


Disposition Time: 19:20


Condition: FAIR


Instructions:  Negative Pressure Wound Therapy


Forms:  [facility] (English)





- Clinical Impression


Clinical Impression: 


 Encounter for postoperative wound check








- Scribe Statement


The provider has reviewed the documentation as recorded by the Scribfelisha Tse





All medical record entries made by the Scribe were at my direction and 

personally dictated by me. I have reviewed the chart and agree that the record 

accurately reflects my personal performance of the history, physical exam, 

medical decision making, and the department course for this patient. I have 

also personally directed, reviewed, and agree with the discharge instructions 

and disposition.

## 2018-07-05 NOTE — CP.PCM.CON
<Blair Waterman - Last Filed: 07/05/18 20:11>





History of Present Illness





- History of Present Illness


History of Present Illness: 





Surgery: Dr. Harris





CC: Non-function wound vac





HPI: 72M w. 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 

managed with wound vac / local wound care presents to ED for wound vac leak.





PMH: right colon adenoma, EC fistula, YORDAN


PSH: Right hemicolectomy, exploratomy laparotomy


Meds: MAR reviewed


Social: denies tobacco, denies alcohol use


Allergies: Iodine





Review of Systems





- Review of Systems


All systems: reviewed and no additional remarkable complaints except (HPI)





Past Patient History





- Past Medical History & Family History


Past Medical History?: Yes





- Past Social History


Smoking Status: Former Smoker





- PULMONARY


Hx Sleep Apnea: Yes (POSITIVE STUDY (last year))





- NEUROLOGICAL


Hx Neurological Disorder: No





- HEENT


Hx HEENT Problems: No





- RENAL


Hx Chronic Kidney Disease: No





- ENDOCRINE/METABOLIC


Hx Endocrine Disorders: No





- HEMATOLOGICAL/ONCOLOGICAL


Hx Blood Disorders: No





- INTEGUMENTARY


Hx Dermatological Problems: No





- MUSCULOSKELETAL/RHEUMATOLOGICAL


Hx Arthritis: Yes





- GASTROINTESTINAL


Hx Gastritis: Yes





- GENITOURINARY/GYNECOLOGICAL


Hx Genitourinary Disorders: No





- PSYCHIATRIC


Hx Substance Use: No





- SURGICAL HISTORY


Hx Surgeries: Yes


Hx Orthopedic Surgery: Yes (ORIF LEFT ELBOW W PLATE PLACEMENT)


Other/Comment: HX: COLON POLYPECTOMY, R hemicolectomy done (5/31) Re-operation 

for leak of EC fistula/necrotic anastomosis (6/13)





- ANESTHESIA


Hx Anesthesia: Yes


Hx Anesthesia Reactions: No


Hx Malignant Hyperthermia: No





Meds


Allergies/Adverse Reactions: 


 Allergies











Allergy/AdvReac Type Severity Reaction Status Date / Time


 


iodine Allergy Severe ANAPHYLAXIS Verified 07/05/18 18:45














Physical Exam





- Constitutional


Appears: Non-toxic, No Acute Distress





- Head Exam


Head Exam: ATRAUMATIC, NORMOCEPHALIC





- Eye Exam


Eye Exam: EOMI





- ENT Exam


ENT Exam: Mucous Membranes Moist





- Neck Exam


Neck exam: Positive for: Full Rom, Normal Inspection





- Respiratory Exam


Respiratory Exam: NORMAL BREATHING PATTERN.  absent: Accessory Muscle Use, 

Respiratory Distress





- GI/Abdominal Exam


GI & Abdominal Exam: Soft.  absent: Distended, Firm, Guarding, Rebound, Rigid


Additional comments: 





midline wound, grannulation tissue at base, no pus/erythema





- Extremities Exam


Extremities exam: Positive for: normal inspection.  Negative for: calf 

tenderness





- Neurological Exam


Neurological exam: Alert, Oriented x3





- Skin


Skin Exam: Dry, Normal Color, Warm





Results





- Vital Signs


Recent Vital Signs: 


 Last Vital Signs











Temp  98.6 F   07/05/18 18:43


 


Pulse  96 H  07/05/18 18:43


 


Resp  18   07/05/18 18:43


 


BP  120/80   07/05/18 18:43


 


Pulse Ox  98   07/05/18 19:53














Assessment & Plan





- Assessment and Plan (Free Text)


Assessment: 





72M w. midline wound


-Wound vac changed at bedside


-Pt to have vac changed in 3 days


-d/w attending





[place] PGY4





<Jeff Harris - Last Filed: 07/05/18 21:13>





Results





- Vital Signs


Recent Vital Signs: 


 Last Vital Signs











Temp  98.6 F   07/05/18 18:43


 


Pulse  80   07/05/18 20:15


 


Resp  14   07/05/18 20:15


 


BP  120/80   07/05/18 20:15


 


Pulse Ox  99   07/05/18 20:15














Attending/Attestation





- Attestation


I have fully participated in the care of the patient.: Yes


I have reviewed all pertinent clinical information: Yes


Notes (Text): 








Pt with wound vac on abdominal wound


High protein diet


Local wound care


c.w home meds


Wound vac change every 3 days


f/u as out pt


Plan d.w pt in detail

## 2018-07-08 ENCOUNTER — HOSPITAL ENCOUNTER (EMERGENCY)
Dept: HOSPITAL 31 - C.ER | Age: 73
Discharge: HOME | End: 2018-07-08
Payer: COMMERCIAL

## 2018-07-08 VITALS — HEART RATE: 100 BPM | SYSTOLIC BLOOD PRESSURE: 118 MMHG | TEMPERATURE: 98 F | DIASTOLIC BLOOD PRESSURE: 72 MMHG

## 2018-07-08 VITALS — RESPIRATION RATE: 18 BRPM

## 2018-07-08 VITALS — BODY MASS INDEX: 31.4 KG/M2

## 2018-07-08 VITALS — OXYGEN SATURATION: 97 %

## 2018-07-08 DIAGNOSIS — Z48.01: Primary | ICD-10-CM

## 2018-07-08 NOTE — CP.PCM.CON
History of Present Illness





- History of Present Illness


History of Present Illness: 





Surgery: Dr. Villalobos





CC: Wound Vac change





HPI: 72M w. 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 

managed with wound vac / local wound care presents to ED for wound vac change.





PMH: right colon adenoma, EC fistula, YORDAN


PSH: Right hemicolectomy, exploratomy laparotomy


Meds: MAR reviewed


Social: denies tobacco, denies alcohol use


Allergies: Iodine





Review of Systems





- Review of Systems


All systems: reviewed and no additional remarkable complaints except (HPI)





Past Patient History





- Past Medical History & Family History


Past Medical History?: Yes





- Past Social History


Smoking Status: Former Smoker





- PULMONARY


Hx Sleep Apnea: Yes (POSITIVE STUDY (last year))





- NEUROLOGICAL


Hx Neurological Disorder: No





- HEENT


Hx HEENT Problems: No





- RENAL


Hx Chronic Kidney Disease: No





- ENDOCRINE/METABOLIC


Hx Endocrine Disorders: No





- HEMATOLOGICAL/ONCOLOGICAL


Hx Blood Disorders: No





- INTEGUMENTARY


Hx Dermatological Problems: No





- MUSCULOSKELETAL/RHEUMATOLOGICAL


Hx Arthritis: Yes





- GASTROINTESTINAL


Hx Gastritis: Yes





- GENITOURINARY/GYNECOLOGICAL


Hx Genitourinary Disorders: No





- PSYCHIATRIC


Hx Substance Use: No





- SURGICAL HISTORY


Hx Surgeries: Yes


Hx Orthopedic Surgery: Yes (ORIF LEFT ELBOW W PLATE PLACEMENT)


Other/Comment: HX: COLON POLYPECTOMY, R hemicolectomy done (5/31) Re-operation 

for leak of EC fistula/necrotic anastomosis (6/13)





- ANESTHESIA


Hx Anesthesia: Yes


Hx Anesthesia Reactions: No


Hx Malignant Hyperthermia: No





Meds


Allergies/Adverse Reactions: 


 Allergies











Allergy/AdvReac Type Severity Reaction Status Date / Time


 


iodine Allergy Severe ANAPHYLAXIS Verified 07/08/18 14:56














Physical Exam





- Constitutional


Appears: Non-toxic, No Acute Distress





- Head Exam


Head Exam: ATRAUMATIC, NORMOCEPHALIC





- Eye Exam


Eye Exam: EOMI.  absent: Scleral icterus





- ENT Exam


ENT Exam: Mucous Membranes Moist





- Neck Exam


Neck exam: Positive for: Full Rom





- Respiratory Exam


Respiratory Exam: NORMAL BREATHING PATTERN.  absent: Accessory Muscle Use, 

Respiratory Distress





- GI/Abdominal Exam


Additional comments: 





midline wound 9x2cm and 2x2.5cm w. tan fibrin at wound bed, no erythema, no pus

, non-tender





- Extremities Exam


Extremities exam: Negative for: calf tenderness, pedal edema





- Neurological Exam


Neurological exam: Alert, Oriented x3





- Psychiatric Exam


Psychiatric exam: Normal Affect, Normal Mood





- Skin


Skin Exam: Dry, Normal Color, Warm





Results





- Vital Signs


Recent Vital Signs: 


 Last Vital Signs











Temp  98.1 F   07/08/18 14:58


 


Pulse  101 H  07/08/18 14:58


 


Resp  18   07/08/18 14:58


 


BP  112/76   07/08/18 14:58


 


Pulse Ox  97   07/08/18 15:26














Assessment & Plan





- Assessment and Plan (Free Text)


Assessment: 





72M w. midline wound


-wound-vac changed at bedside


-pt to follow up w. Dr. Villalobos this Thurdsday


-Return to ED if problems arise


-d/w attending





[facility] PGY4

## 2018-07-08 NOTE — C.PDOC
History Of Present Illness


72 year old male presents to ED s/p hemicolectomy for evaluation of wound 

check. Pt states he was instructed to return for wound vac in 3 days. Denies 

any other complaints. 





Time Seen by Provider: 07/08/18 15:11


Chief Complaint (Nursing): Wound Check


History Per: Patient


History/Exam Limitations: no limitations





Past Medical History


Reviewed: Historical Data, Nursing Documentation, Vital Signs


Vital Signs: 


 Last Vital Signs











Temp  98 F   07/08/18 16:26


 


Pulse  100 H  07/08/18 16:26


 


Resp  18   07/08/18 16:26


 


BP  118/72   07/08/18 16:26


 


Pulse Ox  100   07/08/18 16:26














- Medical History


PMH: Arthritis, Colonic Polyps, Deep Vein Thrombosis ("one year ago" as per 

patient), Gastritis, Sleep Apnea (POSITIVE STUDY (last year))


   Denies: Chronic Kidney Disease


Surgical History: Endoscopy





- [facility]








 (05/31/18)


DRAINAGE OF ABD WALL WITH DRAIN DEV, PERC ENDO APPROACH (05/31/18)


EXCISION OF ABD SUBCU/FASCIA, OPEN APPROACH (05/31/18)


EXCISION OF LARGE INTESTINE, OPEN APPROACH (05/31/18)


INTRODUCTION OF NUTRITIONAL INTO PERIPH VEIN, PERC APPROACH (05/31/18)


REPAIR MESENTERY, PERCUTANEOUS ENDOSCOPIC APPROACH (05/31/18)


RESECTION OF RIGHT LARGE INTESTINE, PERC ENDO APPROACH (05/31/18)








Family History: States: Unknown Family Hx





- Social History


Hx Alcohol Use: No


Hx Substance Use: No





- Immunization History


Hx Tetanus Toxoid Vaccination: No


Hx Influenza Vaccination: No


Hx Pneumococcal Vaccination: Yes





Review Of Systems


Except As Marked, All Systems Reviewed And Found Negative.


Constitutional: Negative for: Fever, Chills


Gastrointestinal: Negative for: Nausea, Vomiting, Diarrhea


Skin: Positive for: Other (wound check to abdomen)





Physical Exam





- Physical Exam


Appears: Non-toxic, No Acute Distress


Skin: Normal Color, Warm, Dry


Head: Atraumatic, Normacephalic


Eye(s): bilateral: Normal Inspection


Oral Mucosa: Moist


Neck: Normal ROM, Supple


Cardiovascular: Rhythm Regular, No Murmur


Respiratory: Normal Breath Sounds, No Rales, No Rhonchi, No Wheezing


Gastrointestinal/Abdominal: Soft, No Tenderness, Other (wound vac to upper 

abdomen)


Extremity: Normal ROM


Neurological/Psych: Oriented x3, Normal Speech





ED Course And Treatment


O2 Sat by Pulse Oximetry: 97


Pulse Ox Interpretation: Normal





Medical Decision Making


Medical Decision Making: 





pt for wound chekc. later states he vomitted yesterday x 1. abd soft no ttp. 

offered labs ivf and zofran. refuses. observed eating in bedside in nad. vac 

changed by surgery. 





Disposition





- Disposition


Disposition: HOME/ ROUTINE


Disposition Time: 07:00


Condition: STABLE


Additional Instructions: 


please follow up with your doctor. return to er with worsening symptoms or 

concerns. you are declining iv fluids and labs tests but you are able to return 

to any er with any concern. 


Instructions:  Wound Dehiscence, Wound Care (DC)


Forms:  [facility] (English)





- Clinical Impression


Clinical Impression: 


 Encounter for management of wound VAC, Encounter for postoperative wound check








- Scribe Statement


The provider has reviewed the documentation as recorded by the Scribe


KP





All medical record entries made by the Scribe were at my direction and 

personally dictated by me. I have reviewed the chart and agree that the record 

accurately reflects my personal performance of the history, physical exam, 

medical decision making, and the department course for this patient. I have 

also personally directed, reviewed, and agree with the discharge instructions 

and disposition.

## 2018-08-09 ENCOUNTER — HOSPITAL ENCOUNTER (INPATIENT)
Dept: HOSPITAL 31 - C.ER | Age: 73
LOS: 6 days | Discharge: HOME | DRG: 452 | End: 2018-08-15
Attending: SURGERY | Admitting: SURGERY
Payer: COMMERCIAL

## 2018-08-09 VITALS — RESPIRATION RATE: 20 BRPM

## 2018-08-09 VITALS — BODY MASS INDEX: 31.8 KG/M2

## 2018-08-09 DIAGNOSIS — D72.819: ICD-10-CM

## 2018-08-09 DIAGNOSIS — Z86.010: ICD-10-CM

## 2018-08-09 DIAGNOSIS — G47.33: ICD-10-CM

## 2018-08-09 DIAGNOSIS — M19.90: ICD-10-CM

## 2018-08-09 DIAGNOSIS — Z86.718: ICD-10-CM

## 2018-08-09 DIAGNOSIS — T81.89XA: Primary | ICD-10-CM

## 2018-08-09 DIAGNOSIS — G89.28: ICD-10-CM

## 2018-08-09 DIAGNOSIS — Z90.49: ICD-10-CM

## 2018-08-09 DIAGNOSIS — R53.1: ICD-10-CM

## 2018-08-09 DIAGNOSIS — K63.2: ICD-10-CM

## 2018-08-09 DIAGNOSIS — Z87.891: ICD-10-CM

## 2018-08-09 DIAGNOSIS — T81.4XXD: ICD-10-CM

## 2018-08-09 LAB
ALBUMIN SERPL-MCNC: 3.4 G/DL (ref 3.5–5)
ALBUMIN/GLOB SERPL: 1.2 {RATIO} (ref 1–2.1)
ALT SERPL-CCNC: 23 U/L (ref 21–72)
APTT BLD: 32 SECONDS (ref 21–34)
AST SERPL-CCNC: 22 U/L (ref 17–59)
BASOPHILS # BLD AUTO: 0 K/UL (ref 0–0.2)
BASOPHILS NFR BLD: 0.3 % (ref 0–2)
BILIRUB UR-MCNC: NEGATIVE MG/DL
BUN SERPL-MCNC: 10 MG/DL (ref 9–20)
CALCIUM SERPL-MCNC: 8.6 MG/DL (ref 8.6–10.4)
EOSINOPHIL # BLD AUTO: 0.2 K/UL (ref 0–0.7)
EOSINOPHIL NFR BLD: 3.3 % (ref 0–4)
ERYTHROCYTE [DISTWIDTH] IN BLOOD BY AUTOMATED COUNT: 15.9 % (ref 11.5–14.5)
GFR NON-AFRICAN AMERICAN: > 60
GLUCOSE UR STRIP-MCNC: NORMAL MG/DL
HGB BLD-MCNC: 12 G/DL (ref 12–18)
INR PPP: 1.1
LEUKOCYTE ESTERASE UR-ACNC: (no result) LEU/UL
LYMPHOCYTES # BLD AUTO: 1.6 K/UL (ref 1–4.3)
LYMPHOCYTES NFR BLD AUTO: 25 % (ref 20–40)
MCH RBC QN AUTO: 27.1 PG (ref 27–31)
MCHC RBC AUTO-ENTMCNC: 33.3 G/DL (ref 33–37)
MCV RBC AUTO: 81.4 FL (ref 80–94)
MONOCYTES # BLD: 0.4 K/UL (ref 0–0.8)
MONOCYTES NFR BLD: 6.4 % (ref 0–10)
NEUTROPHILS # BLD: 4.1 K/UL (ref 1.8–7)
NEUTROPHILS NFR BLD AUTO: 65 % (ref 50–75)
NRBC BLD AUTO-RTO: 0 % (ref 0–2)
PH UR STRIP: 5 [PH] (ref 5–8)
PLATELET # BLD: 252 K/UL (ref 130–400)
PMV BLD AUTO: 8.1 FL (ref 7.2–11.7)
PROT UR STRIP-MCNC: NEGATIVE MG/DL
PROTHROMBIN TIME: 12 SECONDS (ref 9.7–12.2)
RBC # BLD AUTO: 4.44 MIL/UL (ref 4.4–5.9)
RBC # UR STRIP: NEGATIVE /UL
SP GR UR STRIP: 1.01 (ref 1–1.03)
UROBILINOGEN UR-MCNC: NORMAL MG/DL (ref 0.2–1)
WBC # BLD AUTO: 6.3 K/UL (ref 4.8–10.8)

## 2018-08-09 RX ADMIN — HUMAN INSULIN SCH: 100 INJECTION, SOLUTION SUBCUTANEOUS at 21:57

## 2018-08-09 NOTE — CP.PCM.HP
<Blair Waterman - Last Filed: 08/09/18 19:34>





History of Present Illness





- History of Present Illness


History of Present Illness: 





Surgery: Dr. Villalobos





CC: Chronic non-healing wound





HPI: 72M with psh of Laparoscopic R hemicolectomy in May 2018 complicated by 

enterocutaneous fistula and post-operative bleeding requiring take backs.  Pt 

presents for chronic non-healing midline wound and complaints of general 

weakness and fatigue. Pt's wound has been managed out patient with local wound 

care with minimal progress. The inferior portion of the wound is now starting 

to drain sero/purulent fluid. The pt denies any fevers or chills. He has 

decreased appetite but is able to tolerate diet. He denies nausea and vomiting, 

he is having normal BM. He does have complaints of constant R thigh pain with 

no alleviating or aggravating factors which has been increasing in severity 

over past month.





PMH: right colon adenoma, EC fistula, DVT, ISMAEL


PSH: Right hemicolectomy, exploratomy laparotomy


Meds: MAR reviewed


Social: denies tobacco, denies alcohol use


Allergies: Iodine


Family hx: non-contributory








Present on Admission





- Present on Admission


Any Indicators Present on Admission: Yes


History of DVT/PE: Yes





Review of Systems





- Review of Systems


All systems: reviewed and no additional remarkable complaints except (HPI)





Past Patient History





- Past Medical History & Family History


Past Medical History?: Yes





- Past Social History


Smoking Status: Former Smoker





- PULMONARY


Hx Sleep Apnea: Yes (POSITIVE STUDY (last year))





- NEUROLOGICAL


Hx Neurological Disorder: No





- HEENT


Hx HEENT Problems: No





- RENAL


Hx Chronic Kidney Disease: No





- ENDOCRINE/METABOLIC


Hx Endocrine Disorders: No





- HEMATOLOGICAL/ONCOLOGICAL


Hx Blood Disorders: No





- INTEGUMENTARY


Hx Dermatological Problems: No





- MUSCULOSKELETAL/RHEUMATOLOGICAL


Hx Arthritis: Yes





- GASTROINTESTINAL


Hx Gastritis: Yes





- GENITOURINARY/GYNECOLOGICAL


Hx Genitourinary Disorders: No





- PSYCHIATRIC


Hx Substance Use: No





- SURGICAL HISTORY


Other/Comment: bowel resection





- ANESTHESIA


Hx Anesthesia: Yes


Hx Anesthesia Reactions: No


Hx Malignant Hyperthermia: No





Meds


Allergies/Adverse Reactions: 


 Allergies











Allergy/AdvReac Type Severity Reaction Status Date / Time


 


iodine Allergy Severe ANAPHYLAXIS Verified 08/09/18 10:42














Physical Exam





- Constitutional


Appears: Non-toxic, No Acute Distress





- Head Exam


Head Exam: ATRAUMATIC, NORMOCEPHALIC





- Eye Exam


Eye Exam: EOMI





- ENT Exam


ENT Exam: Mucous Membranes Moist, Normal External Ear Exam





- Neck Exam


Neck exam: Positive for: Full Rom





- Respiratory Exam


Respiratory Exam: NORMAL BREATHING PATTERN.  absent: Accessory Muscle Use, 

Respiratory Distress





- Cardiovascular Exam


Cardiovascular Exam: REGULAR RHYTHM





- GI/Abdominal Exam


GI & Abdominal Exam: Soft.  absent: Distended, Firm, Guarding, Rebound, Rigid, 

Tenderness


Additional comments: 


chronic midline wound 9x2cm and 2x2.5cm w. tan fibrin at wound bed, decreased 

from prior exams, non-tender, some purulent drainage noted from lower wound, no 

odor








- Extremities Exam


Additional comments: 





R thigh pain, normal ROM, no obvious deformity





- Neurological Exam


Neurological exam: Alert, Oriented x3





- Psychiatric Exam


Psychiatric exam: Normal Affect, Normal Mood





Results





- Vital Signs


Recent Vital Signs: 





 Last Vital Signs











Temp  98.0 F   08/09/18 17:54


 


Pulse  81   08/09/18 17:54


 


Resp  20   08/09/18 17:54


 


BP  131/82   08/09/18 17:54


 


Pulse Ox  96   08/09/18 18:54














- Labs


Result Diagrams: 


 08/09/18 11:53





 08/09/18 11:53


Labs: 





 Laboratory Results - last 24 hr











  08/09/18 08/09/18 08/09/18





  11:53 11:53 11:53


 


WBC  6.3  


 


RBC  4.44  


 


Hgb  12.0  


 


Hct  36.1  


 


MCV  81.4  D  


 


MCH  27.1  


 


MCHC  33.3  


 


RDW  15.9 H  


 


Plt Count  252  


 


MPV  8.1  


 


Neut % (Auto)  65.0  


 


Lymph % (Auto)  25.0  


 


Mono % (Auto)  6.4  


 


Eos % (Auto)  3.3  


 


Baso % (Auto)  0.3  


 


Neut # (Auto)  4.1  


 


Lymph # (Auto)  1.6  


 


Mono # (Auto)  0.4  


 


Eos # (Auto)  0.2  


 


Baso # (Auto)  0.0  


 


PT   


 


INR   


 


APTT   


 


Sodium    144


 


Potassium    3.6


 


Chloride    106


 


Carbon Dioxide    30


 


Anion Gap    11


 


BUN    10


 


Creatinine    1.0


 


Est GFR ( Amer)    > 60


 


Est GFR (Non-Af Amer)    > 60


 


POC Glucose (mg/dL)   


 


Random Glucose    125 H


 


Calcium    8.6


 


Phosphorus    2.3 L


 


Magnesium    1.9


 


Total Bilirubin    0.3


 


AST    22


 


ALT    23


 


Alkaline Phosphatase    140 H D


 


Total Protein    6.3


 


Albumin    3.4 L


 


Globulin    2.9


 


Albumin/Globulin Ratio    1.2


 


Prealbumin   


 


Vitamin B12   


 


Urine Color   Yellow 


 


Urine Clarity   Clear 


 


Urine pH   5.0 


 


Ur Specific Gravity   1.014 


 


Urine Protein   Negative 


 


Urine Glucose (UA)   Normal 


 


Urine Ketones   Negative 


 


Urine Blood   Negative 


 


Urine Nitrate   Negative 


 


Urine Bilirubin   Negative 


 


Urine Urobilinogen   Normal 


 


Ur Leukocyte Esterase   Trace 


 


Urine WBC (Auto)   1 


 


Urine RBC (Auto)   < 1 














  08/09/18 08/09/18 08/09/18





  11:53 11:53 11:53


 


WBC   


 


RBC   


 


Hgb   


 


Hct   


 


MCV   


 


MCH   


 


MCHC   


 


RDW   


 


Plt Count   


 


MPV   


 


Neut % (Auto)   


 


Lymph % (Auto)   


 


Mono % (Auto)   


 


Eos % (Auto)   


 


Baso % (Auto)   


 


Neut # (Auto)   


 


Lymph # (Auto)   


 


Mono # (Auto)   


 


Eos # (Auto)   


 


Baso # (Auto)   


 


PT  12.0  


 


INR  1.1  


 


APTT  32  


 


Sodium   


 


Potassium   


 


Chloride   


 


Carbon Dioxide   


 


Anion Gap   


 


BUN   


 


Creatinine   


 


Est GFR ( Amer)   


 


Est GFR (Non-Af Amer)   


 


POC Glucose (mg/dL)   


 


Random Glucose   


 


Calcium   


 


Phosphorus   


 


Magnesium   


 


Total Bilirubin   


 


AST   


 


ALT   


 


Alkaline Phosphatase   


 


Total Protein   


 


Albumin   


 


Globulin   


 


Albumin/Globulin Ratio   


 


Prealbumin   24.5 


 


Vitamin B12    224 L


 


Urine Color   


 


Urine Clarity   


 


Urine pH   


 


Ur Specific Gravity   


 


Urine Protein   


 


Urine Glucose (UA)   


 


Urine Ketones   


 


Urine Blood   


 


Urine Nitrate   


 


Urine Bilirubin   


 


Urine Urobilinogen   


 


Ur Leukocyte Esterase   


 


Urine WBC (Auto)   


 


Urine RBC (Auto)   














  08/09/18





  17:22


 


WBC 


 


RBC 


 


Hgb 


 


Hct 


 


MCV 


 


MCH 


 


MCHC 


 


RDW 


 


Plt Count 


 


MPV 


 


Neut % (Auto) 


 


Lymph % (Auto) 


 


Mono % (Auto) 


 


Eos % (Auto) 


 


Baso % (Auto) 


 


Neut # (Auto) 


 


Lymph # (Auto) 


 


Mono # (Auto) 


 


Eos # (Auto) 


 


Baso # (Auto) 


 


PT 


 


INR 


 


APTT 


 


Sodium 


 


Potassium 


 


Chloride 


 


Carbon Dioxide 


 


Anion Gap 


 


BUN 


 


Creatinine 


 


Est GFR ( Amer) 


 


Est GFR (Non-Af Amer) 


 


POC Glucose (mg/dL)  95


 


Random Glucose 


 


Calcium 


 


Phosphorus 


 


Magnesium 


 


Total Bilirubin 


 


AST 


 


ALT 


 


Alkaline Phosphatase 


 


Total Protein 


 


Albumin 


 


Globulin 


 


Albumin/Globulin Ratio 


 


Prealbumin 


 


Vitamin B12 


 


Urine Color 


 


Urine Clarity 


 


Urine pH 


 


Ur Specific Gravity 


 


Urine Protein 


 


Urine Glucose (UA) 


 


Urine Ketones 


 


Urine Blood 


 


Urine Nitrate 


 


Urine Bilirubin 


 


Urine Urobilinogen 


 


Ur Leukocyte Esterase 


 


Urine WBC (Auto) 


 


Urine RBC (Auto) 














- Imaging and Cardiology


  ** CT scan - abdomen


Status: Image reviewed by me, Report reviewed by me





Assessment & Plan





- Assessment and Plan (Free Text)


Assessment: 





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 

non-healing wound w. concerns for fistula


-NPO w. ice chips


-PICC line ordered, will start TPN when line in place


-Consult ID- broad spectrum abx


-Wound Care consulted, will place wound vac


-F/U cxs


-GI/DVT ppx


-d/w attending





[facility] PGY4





Decision To Admit





- Pt Status Changed To:


Hospital Disposition Of: Inpatient





- Admit Certification


Admit to Inpatient:: After my assessment, the patient will require 

hospitalization for at least two midnights.  This is because of the severity of 

symptoms shown, intensity of services needed, and/or the medical risk in this 

patient being treated as an outpatient.





- InPatient:


Physician Admission Certification:: yes





- .


Bed Request Type: Regular





<Jas Villalobos - Last Filed: 08/09/18 20:32>





Results





- Vital Signs


Recent Vital Signs: 





 Last Vital Signs











Temp  98.0 F   08/09/18 17:54


 


Pulse  81   08/09/18 17:54


 


Resp  20   08/09/18 17:54


 


BP  131/82   08/09/18 17:54


 


Pulse Ox  96   08/09/18 18:54














- Labs


Result Diagrams: 


 08/09/18 11:53





 08/09/18 11:53


Labs: 





 Laboratory Results - last 24 hr











  08/09/18 08/09/18 08/09/18





  11:53 11:53 11:53


 


WBC  6.3  


 


RBC  4.44  


 


Hgb  12.0  


 


Hct  36.1  


 


MCV  81.4  D  


 


MCH  27.1  


 


MCHC  33.3  


 


RDW  15.9 H  


 


Plt Count  252  


 


MPV  8.1  


 


Neut % (Auto)  65.0  


 


Lymph % (Auto)  25.0  


 


Mono % (Auto)  6.4  


 


Eos % (Auto)  3.3  


 


Baso % (Auto)  0.3  


 


Neut # (Auto)  4.1  


 


Lymph # (Auto)  1.6  


 


Mono # (Auto)  0.4  


 


Eos # (Auto)  0.2  


 


Baso # (Auto)  0.0  


 


PT   


 


INR   


 


APTT   


 


Sodium    144


 


Potassium    3.6


 


Chloride    106


 


Carbon Dioxide    30


 


Anion Gap    11


 


BUN    10


 


Creatinine    1.0


 


Est GFR ( Amer)    > 60


 


Est GFR (Non-Af Amer)    > 60


 


POC Glucose (mg/dL)   


 


Random Glucose    125 H


 


Calcium    8.6


 


Phosphorus    2.3 L


 


Magnesium    1.9


 


Total Bilirubin    0.3


 


AST    22


 


ALT    23


 


Alkaline Phosphatase    140 H D


 


Total Protein    6.3


 


Albumin    3.4 L


 


Globulin    2.9


 


Albumin/Globulin Ratio    1.2


 


Prealbumin   


 


Vitamin B12   


 


Urine Color   Yellow 


 


Urine Clarity   Clear 


 


Urine pH   5.0 


 


Ur Specific Gravity   1.014 


 


Urine Protein   Negative 


 


Urine Glucose (UA)   Normal 


 


Urine Ketones   Negative 


 


Urine Blood   Negative 


 


Urine Nitrate   Negative 


 


Urine Bilirubin   Negative 


 


Urine Urobilinogen   Normal 


 


Ur Leukocyte Esterase   Trace 


 


Urine WBC (Auto)   1 


 


Urine RBC (Auto)   < 1 














  08/09/18 08/09/18 08/09/18





  11:53 11:53 11:53


 


WBC   


 


RBC   


 


Hgb   


 


Hct   


 


MCV   


 


MCH   


 


MCHC   


 


RDW   


 


Plt Count   


 


MPV   


 


Neut % (Auto)   


 


Lymph % (Auto)   


 


Mono % (Auto)   


 


Eos % (Auto)   


 


Baso % (Auto)   


 


Neut # (Auto)   


 


Lymph # (Auto)   


 


Mono # (Auto)   


 


Eos # (Auto)   


 


Baso # (Auto)   


 


PT  12.0  


 


INR  1.1  


 


APTT  32  


 


Sodium   


 


Potassium   


 


Chloride   


 


Carbon Dioxide   


 


Anion Gap   


 


BUN   


 


Creatinine   


 


Est GFR ( Amer)   


 


Est GFR (Non-Af Amer)   


 


POC Glucose (mg/dL)   


 


Random Glucose   


 


Calcium   


 


Phosphorus   


 


Magnesium   


 


Total Bilirubin   


 


AST   


 


ALT   


 


Alkaline Phosphatase   


 


Total Protein   


 


Albumin   


 


Globulin   


 


Albumin/Globulin Ratio   


 


Prealbumin   24.5 


 


Vitamin B12    224 L


 


Urine Color   


 


Urine Clarity   


 


Urine pH   


 


Ur Specific Gravity   


 


Urine Protein   


 


Urine Glucose (UA)   


 


Urine Ketones   


 


Urine Blood   


 


Urine Nitrate   


 


Urine Bilirubin   


 


Urine Urobilinogen   


 


Ur Leukocyte Esterase   


 


Urine WBC (Auto)   


 


Urine RBC (Auto)   














  08/09/18





  17:22


 


WBC 


 


RBC 


 


Hgb 


 


Hct 


 


MCV 


 


MCH 


 


MCHC 


 


RDW 


 


Plt Count 


 


MPV 


 


Neut % (Auto) 


 


Lymph % (Auto) 


 


Mono % (Auto) 


 


Eos % (Auto) 


 


Baso % (Auto) 


 


Neut # (Auto) 


 


Lymph # (Auto) 


 


Mono # (Auto) 


 


Eos # (Auto) 


 


Baso # (Auto) 


 


PT 


 


INR 


 


APTT 


 


Sodium 


 


Potassium 


 


Chloride 


 


Carbon Dioxide 


 


Anion Gap 


 


BUN 


 


Creatinine 


 


Est GFR ( Amer) 


 


Est GFR (Non-Af Amer) 


 


POC Glucose (mg/dL)  95


 


Random Glucose 


 


Calcium 


 


Phosphorus 


 


Magnesium 


 


Total Bilirubin 


 


AST 


 


ALT 


 


Alkaline Phosphatase 


 


Total Protein 


 


Albumin 


 


Globulin 


 


Albumin/Globulin Ratio 


 


Prealbumin 


 


Vitamin B12 


 


Urine Color 


 


Urine Clarity 


 


Urine pH 


 


Ur Specific Gravity 


 


Urine Protein 


 


Urine Glucose (UA) 


 


Urine Ketones 


 


Urine Blood 


 


Urine Nitrate 


 


Urine Bilirubin 


 


Urine Urobilinogen 


 


Ur Leukocyte Esterase 


 


Urine WBC (Auto) 


 


Urine RBC (Auto) 














Assessment & Plan





- Assessment and Plan (Free Text)


Plan: 


I personally saw and examined the patient with the resident staff and agree 

with the above assessment and plan. I personally reviewed the available 

diagnostic images and imaging reports. See clinic visit progress note for 

further details

## 2018-08-09 NOTE — RAD
Date of service: 



08/09/2018



PROCEDURE:  CHEST RADIOGRAPH, 1 VIEW



HISTORY:

Pre Op



COMPARISON:

6/25/2018



FINDINGS:



LUNGS:

Clear.



PLEURA:

No pneumothorax or pleural fluid seen.



CARDIOVASCULAR:

Normal.



OSSEOUS STRUCTURES:

No significant abnormalities.



VISUALIZED UPPER ABDOMEN:

Normal.



OTHER FINDINGS:

None. 



IMPRESSION:

No active disease.

## 2018-08-09 NOTE — CT
Date of service: 



08/09/2018



PROCEDURE:  CT Abdomen and Pelvis without intravenous contrast



HISTORY:

? intra-abd infection, ? fistula,



COMPARISON:

8/1/2018



TECHNIQUE:

Without contrast.. 



Contrast dose: 0



Radiation dose:



Total exam DLP = 1174.20 mGy-cm.



This CT exam was performed using one or more of the following dose 

reduction techniques: Automated exposure control, adjustment of the 

mA and/or kV according to patient size, and/or use of iterative 

reconstruction technique.



FINDINGS:



LOWER THORAX:

Linear scar/ atelectasis in right lower lobe. 



LIVER:

Unremarkable. No gross lesion or ductal dilatation.  



GALLBLADDER AND BILE DUCTS:

Unremarkable. 



PANCREAS:

Unremarkable. No gross lesion or ductal dilatation.



SPLEEN:

Unremarkable. 



ADRENALS:

12 mm right adrenal nodule, likely adrenal adenoma. Too small to 

characterize in terms of attenuation. Unremarkable left adrenal 

gland. . 



KIDNEYS AND URETERS:

Unremarkable. No hydronephrosis. No solid mass. 



VASCULATURE:

Unremarkable. No aortic aneurysm. 



BOWEL:

Status post right hemicolectomy. Ileocolic anastomosis in right upper 

quadrant. No bowel obstruction. Abnormal soft tissue density along 

the anterior wall of small bowel loops in the mid/ upper abdomen 

unchanged in appearance compared to prior examination.  There is 

associated mural thickening of these loops of small bowel adjacent to 

the anterior abdominal wall.  A similar process is seen involving at 

least 1 loop of small bowel along the right lateral abdominal wall. 

Possible postsurgical changes. This is again unchanged in appearance 

when compared to the prior examination.  There is an apparent in 

capsulated structure containing soft tissue and fat attenuation in 

the right upper quadrant of the abdomen just inferior to the 

gallbladder with calcification or calcified suture material in its 

wall at several points. This is unchanged in appearance compared to 

the prior examination.  This may represent an area of necrosis, 

possibly related to prior surgery.  There is no gas to suggest an 

acute infectious process. 



APPENDIX:

Right hemicolectomy 



PERITONEUM:

No ascites.  No pneumoperitoneum. 



LYMPH NODES:

Unremarkable. No enlarged lymph nodes. 



BLADDER:

Unremarkable. 



REPRODUCTIVE:

Normal prostate 



BONES:

No acute fracture. 



OTHER FINDINGS:

None.



IMPRESSION:

No significant change since 8/1/2018.  Possible fat necrosis in right 

upper quadrant. Loops of small bowel apparent to the anterior right 

lateral abdominal wall with mural thickening along the anti 

mesenteric border, possibly infectious/inflammatory but not changed 

since 8/1/2018. No drainable collection identified. Status post right 

hemicolectomy and ileocolonic anastomosis. No other significant 

abnormality.

## 2018-08-09 NOTE — C.PDOC
History Of Present Illness


71 y/o male presents to ED for further evaluation of wound infection. Patient 

had surgery 3 weeks ago by Dr. Villalobos and had wound check in clinic today, noted 

to have purulent drainage for several days. Patient denies fever or any other 

complaints at this time. 


Time Seen by Provider: 08/09/18 10:53


Chief Complaint (Nursing): Abdominal Pain


History Per: Patient


History/Exam Limitations: no limitations


Onset/Duration Of Symptoms: Days


Current Symptoms Are (Timing): Still Present


Location Of Pain/Discomfort: Diffuse





Past Medical History


Reviewed: Historical Data, Nursing Documentation, Vital Signs


Vital Signs: 


 Last Vital Signs











Temp  98.0 F   08/09/18 17:54


 


Pulse  81   08/09/18 17:54


 


Resp  20   08/09/18 17:54


 


BP  131/82   08/09/18 17:54


 


Pulse Ox  95   08/09/18 17:54














- Medical History


PMH: Arthritis, Colonic Polyps, Deep Vein Thrombosis ("one year ago" as per 

patient), Gastritis, Sleep Apnea (POSITIVE STUDY (last year))


Surgical History: Endoscopy





- [facility] Procedures








 (05/31/18)


DRAINAGE OF ABD WALL WITH DRAIN DEV, PERC ENDO APPROACH (05/31/18)


EXCISION OF ABD SUBCU/FASCIA, OPEN APPROACH (05/31/18)


EXCISION OF LARGE INTESTINE, OPEN APPROACH (05/31/18)


INTRODUCTION OF NUTRITIONAL INTO PERIPH VEIN, PERC APPROACH (05/31/18)


REPAIR MESENTERY, PERCUTANEOUS ENDOSCOPIC APPROACH (05/31/18)


RESECTION OF RIGHT LARGE INTESTINE, PERC ENDO APPROACH (05/31/18)








Family History: States: No Known Family Hx





- Social History


Hx Alcohol Use: No


Hx Substance Use: No





- Immunization History


Hx Tetanus Toxoid Vaccination: No


Hx Influenza Vaccination: No


Hx Pneumococcal Vaccination: Yes





Review Of Systems


Constitutional: Negative for: Fever, Chills


Gastrointestinal: Positive for: Abdominal Pain.  Negative for: Nausea, Vomiting


Skin: Negative for: Rash





Physical Exam





- Physical Exam


Appears: Non-toxic, No Acute Distress


Skin: Warm, Dry, No Rash


Head: Atraumatic, Normacephalic


Eye(s): bilateral: Normal Inspection


Oral Mucosa: Moist


Neck: Supple


Cardiovascular: Rhythm Regular


Respiratory: Normal Breath Sounds, No Rales, No Rhonchi, No Wheezing


Gastrointestinal/Abdominal: Soft, No Tenderness, No Guarding, No Rebound, Other 

(large mid abdomen wound with active purulent drainage)


Back: No CVA Tenderness, No Paraspinal Tenderness


Extremity: Normal ROM, Capillary Refill (<2 seconds)


Neurological/Psych: Oriented x3, Normal Speech, Normal Cognition





ED Course And Treatment





- Laboratory Results


Result Diagrams: 


 08/09/18 11:53





 08/09/18 11:53


O2 Sat by Pulse Oximetry: 96 (RA)


Pulse Ox Interpretation: Normal





Medical Decision Making


Medical Decision Making: 


The case was discussed with Dr. Jas Villalobos, who wants to admit the patient for 

IV antibiotics as the patient has active purulent drainage. 





Disposition





- Disposition


Disposition: HOSPITALIZED


Disposition Time: 13:38


Condition: STABLE





- Clinical Impression


Clinical Impression: 


 Postoperative abdominal pain, Intra-abdominal infection








- PA / NP / Resident Statement


MD/DO has reviewed & agrees with the documentation as recorded.





- Scribe Statement


The provider has reviewed the documentation as recorded by the Danielleibfelisha Kraus





All medical record entries made by the Danielleibfelisha were at my direction and 

personally dictated by me. I have reviewed the chart and agree that the record 

accurately reflects my personal performance of the history, physical exam, 

medical decision making, and the department course for this patient. I have 

also personally directed, reviewed, and agree with the discharge instructions 

and disposition.

## 2018-08-10 LAB
BUN SERPL-MCNC: 5 MG/DL (ref 9–20)
CALCIUM SERPL-MCNC: 8.5 MG/DL (ref 8.6–10.4)
ERYTHROCYTE [DISTWIDTH] IN BLOOD BY AUTOMATED COUNT: 15.5 % (ref 11.5–14.5)
GFR NON-AFRICAN AMERICAN: > 60
HGB BLD-MCNC: 11.6 G/DL (ref 12–18)
MCH RBC QN AUTO: 26.7 PG (ref 27–31)
MCHC RBC AUTO-ENTMCNC: 33.1 G/DL (ref 33–37)
MCV RBC AUTO: 80.6 FL (ref 80–94)
PLATELET # BLD: 238 K/UL (ref 130–400)
PMV BLD AUTO: 8.3 FL (ref 7.2–11.7)
RBC # BLD AUTO: 4.35 MIL/UL (ref 4.4–5.9)
WBC # BLD AUTO: 5.2 K/UL (ref 4.8–10.8)

## 2018-08-10 PROCEDURE — 02HV33Z INSERTION OF INFUSION DEVICE INTO SUPERIOR VENA CAVA, PERCUTANEOUS APPROACH: ICD-10-PCS | Performed by: SURGERY

## 2018-08-10 PROCEDURE — 3E0436Z INTRODUCTION OF NUTRITIONAL SUBSTANCE INTO CENTRAL VEIN, PERCUTANEOUS APPROACH: ICD-10-PCS | Performed by: SURGERY

## 2018-08-10 RX ADMIN — WATER SCH MLS/HR: 1 INJECTION INTRAMUSCULAR; INTRAVENOUS; SUBCUTANEOUS at 08:40

## 2018-08-10 RX ADMIN — WATER SCH MLS/HR: 1 INJECTION INTRAMUSCULAR; INTRAVENOUS; SUBCUTANEOUS at 16:30

## 2018-08-10 RX ADMIN — WATER SCH MLS/HR: 1 INJECTION INTRAMUSCULAR; INTRAVENOUS; SUBCUTANEOUS at 23:56

## 2018-08-10 RX ADMIN — ENOXAPARIN SODIUM SCH MG: 30 INJECTION SUBCUTANEOUS at 09:56

## 2018-08-10 RX ADMIN — TAZOBACTAM SODIUM AND PIPERACILLIN SODIUM SCH MLS/HR: 375; 3 INJECTION, SOLUTION INTRAVENOUS at 08:00

## 2018-08-10 RX ADMIN — HUMAN INSULIN SCH: 100 INJECTION, SOLUTION SUBCUTANEOUS at 07:39

## 2018-08-10 RX ADMIN — WATER SCH MLS/HR: 1 INJECTION INTRAMUSCULAR; INTRAVENOUS; SUBCUTANEOUS at 00:09

## 2018-08-10 RX ADMIN — HUMAN INSULIN SCH: 100 INJECTION, SOLUTION SUBCUTANEOUS at 11:33

## 2018-08-10 RX ADMIN — TAZOBACTAM SODIUM AND PIPERACILLIN SODIUM SCH MLS/HR: 375; 3 INJECTION, SOLUTION INTRAVENOUS at 23:56

## 2018-08-10 RX ADMIN — HUMAN INSULIN SCH: 100 INJECTION, SOLUTION SUBCUTANEOUS at 17:33

## 2018-08-10 RX ADMIN — HUMAN INSULIN SCH: 100 INJECTION, SOLUTION SUBCUTANEOUS at 21:22

## 2018-08-10 RX ADMIN — TAZOBACTAM SODIUM AND PIPERACILLIN SODIUM SCH MLS/HR: 375; 3 INJECTION, SOLUTION INTRAVENOUS at 16:00

## 2018-08-10 RX ADMIN — COLLAGENASE SANTYL SCH APPLIC: 250 OINTMENT TOPICAL at 11:07

## 2018-08-10 RX ADMIN — TAZOBACTAM SODIUM AND PIPERACILLIN SODIUM SCH MLS/HR: 375; 3 INJECTION, SOLUTION INTRAVENOUS at 00:10

## 2018-08-10 NOTE — CP.PCM.PN
Subjective





- Date & Time of Evaluation


Date of Evaluation: 08/10/18


Time of Evaluation: 13:44





- Subjective


Subjective: 





Surgery: Dr. Villalobos





Pt seen and examine. No acute events overnight. Pt is resting comfortably in 

bed. No F/C. No abd pain. No N/V/D.





Objective





- Vital Signs/Intake and Output


Vital Signs (last 24 hours): 


 











Temp Pulse Resp BP Pulse Ox


 


 97.9 F   72   20   122/82   97 


 


 08/10/18 08:00  08/10/18 08:00  08/10/18 08:00  08/10/18 08:00  08/10/18 08:00








Intake and Output: 


 











 08/10/18 08/10/18





 06:59 18:59


 


Output Total 600 


 


Balance -600 














- Medications


Medications: 


 Current Medications





Collagenase (Santyl)  1 gm TOP DAILY [facility]


   Last Admin: 08/10/18 11:07 Dose:  1 applic


Enoxaparin Sodium (Lovenox)  30 mg SC DAILY [facility]


   Last Admin: 08/10/18 09:56 Dose:  30 mg


Gabapentin (Neurontin)  100 mg PO TID [facility]


   Last Admin: 08/10/18 13:08 Dose:  100 mg


Sodium Chloride (Sodium Chloride 0.9%)  1,000 mls @ 75 mls/hr IV .[id number] [facility]


   Last Admin: 08/10/18 07:45 Dose:  75 mls/hr


Piperacillin Sod/Tazobactam Sod (Zosyn 3.375 Gm Iv Premix)  3.375 gm in 50 mls 

@ 100 mls/hr IVPB Q8H [facility]


   PRN Reason: Protocol


   Last Admin: 08/10/18 08:00 Dose:  100 mls/hr


Metronidazole (Flagyl)  500 mg in 100 mls @ 100 mls/hr IVPB Q8H [facility]


   PRN Reason: Protocol


   Last Admin: 08/10/18 08:40 Dose:  100 mls/hr


Fat Emulsion Intravenous (Intralipid 20%)  250 mls @ 42 mls/hr IV DAILY@1800 [facility]


   Stop: 08/10/18 23:58


Chromium/Copper/Manganese/Zinc 1 ml/ Multivitamins/Vitamin C 10 ml/ Sodium 

Chloride 35 meq / Potassium Phosphate 30 mmole / Heparin Sodium (Porcine) 1, 

000 units/ Magnesium Sulfate 6 meq/ Amino Acids  1,032.2278 mls @ 42 mls/hr IV 

.Q24H [facility]


   Stop: 08/11/18 17:59


Insulin Human Regular (Novolin R)  0 unit SC ACHS [facility]


   PRN Reason: Protocol


   Last Admin: 08/10/18 11:33 Dose:  Not Given


Pantoprazole Sodium (Protonix Inj)  40 mg IVP DAILY [facility]


   Last Admin: 08/10/18 09:56 Dose:  40 mg











- Labs


Labs: 


 





 08/10/18 09:22 





 08/10/18 09:22 





 











PT  12.0 SECONDS (9.7-12.2)   08/09/18  11:53    


 


INR  1.1   08/09/18  11:53    


 


APTT  32 SECONDS (21-34)   08/09/18  11:53    














- Constitutional


Appears: Non-toxic, No Acute Distress





- Head Exam


Head Exam: ATRAUMATIC, NORMOCEPHALIC





- Eye Exam


Eye Exam: EOMI





- ENT Exam


ENT Exam: Mucous Membranes Moist





- Neck Exam


Neck Exam: Full ROM





- Respiratory Exam


Respiratory Exam: NORMAL BREATHING PATTERN.  absent: Accessory Muscle Use, 

Respiratory Distress





- GI/Abdominal Exam


GI & Abdominal Exam: Soft.  absent: Distended, Firm, Guarding, Rigid, Tenderness

, Rebound


Additional comments: 





chronic midline wound 9x2cm and 2x2.5cm w. tan fibrin at wound bed, decreased 

from prior exams, non-tender, some purulent drainage noted from lower wound, no 

odor





- Extremities Exam


Extremities Exam: absent: Calf Tenderness, Pedal Edema





- Neurological Exam


Neurological Exam: Alert, Awake, Oriented x3





Assessment and Plan





- Assessment and Plan (Free Text)


Assessment: 





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 

non-healing wound w. concerns for fistula


-NPO w. ice chips


-PICC line placed, will start TPN


-ABX per ID


-Wound vac placed, will change Q3-4 days


-F/U cxs


-GI/DVT ppx


-d/w attending





Zemaitis PGY4

## 2018-08-10 NOTE — CP.PCM.CON
History of Present Illness





- History of Present Illness


History of Present Illness: 


INFECTIOUS DISEASE CONSULT;





Reason for consult ;


 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 

HEMICOLECTOMY.





HPI: 





72 year old [place]olan-speaking male was admitted on 8/9/18 for nonhealing 

postsurgical wound which he developed after laparoscopy/right hemicolectomy in 

May 2018.


Patient states he was managed as outpatient with local wound care and wound 

vacs which he managed as outpatient.  Patient states he had to go for surgical 

intervention 3 times previously.


Patient now comes in with seropurulent wound drainage from the enterocutaneous 

fistula.


Patient denies any fever or chills but states he has poor appetite but is able 

to tolerate diet.  Denies any nausea or vomiting.


Patient seen today post operatively after having a wound VAC placed today and 

also with right upper arm PICC line placement.





History obtained by an  MADDIE Santillan.


On admission patient was appropriately cultured and given a dose of Diflucan 

400 mg with IV Zosyn 1 dose and IV Flagyl one dose in the ER.


Infectious disease consult requested by PMD for IV antibiotics.











PMH: right colon adenoma, EC fistula, DVT, ISMAEL


PSH: Right hemicolectomy, exploratomy laparotomy


Meds: MAR reviewed


Social: denies tobacco, denies alcohol use


Allergies: Iodine


Family hx: non-contributory





Review of Systems





- Constitutional


Constitutional: Anorexia.  absent: Chills, Fever





- EENT


Nose/Mouth/Throat: absent: Mouth Lesions, Odynophagia





- Cardiovascular


Cardiovascular: absent: Chest Pain, Dyspnea





- Respiratory


Respiratory: absent: Cough





- Gastrointestinal


Gastrointestinal: Change in Bowel Habits.  absent: Abdominal Pain, Nausea, 

Vomiting





- Genitourinary


Genitourinary: absent: Dysuria, Urinary Hesitance, Freq UTI





- Neurological


Neurological: absent: Dizziness, Headaches





- Hematologic/Lymphatic


Hematologic: As Per HPI.  absent: Lymphadenopathy





Past Patient History





- Past Medical History & Family History


Past Medical History?: Yes





- Past Social History


Smoking Status: Former Smoker





- PULMONARY


Hx Respiratory Disorders: Yes


Hx Sleep Apnea: Yes (POSITIVE STUDY (last year))





- NEUROLOGICAL


Hx Neurological Disorder: No





- HEENT


Hx HEENT Problems: No





- RENAL


Hx Chronic Kidney Disease: No





- ENDOCRINE/METABOLIC


Hx Endocrine Disorders: No





- HEMATOLOGICAL/ONCOLOGICAL


Hx Blood Disorders: No





- INTEGUMENTARY


Hx Dermatological Problems: No


Other/Comment: INFECTED SURGICAL WOUND





- MUSCULOSKELETAL/RHEUMATOLOGICAL


Hx Musculoskeletal Disorders: Yes


Hx Arthritis: Yes





- GASTROINTESTINAL


Hx Gastrointestinal Disorders: Yes


Hx Gastritis: Yes





- GENITOURINARY/GYNECOLOGICAL


Hx Genitourinary Disorders: No





- PSYCHIATRIC


Hx Psychophysiologic Disorder: No


Hx Substance Use: No





- SURGICAL HISTORY


Other/Comment: bowel resection





- ANESTHESIA


Hx Anesthesia: Yes


Hx Anesthesia Reactions: No


Hx Malignant Hyperthermia: No


Has any member of the family had a problem w/ anesthesia?: No





Meds


Allergies/Adverse Reactions: 


 Allergies











Allergy/AdvReac Type Severity Reaction Status Date / Time


 


iodine Allergy Severe ANAPHYLAXIS Verified 08/09/18 10:42














- Medications


Medications: 


 Current Medications





Collagenase (Santyl)  1 gm [facility]ILY [facility]


   Last Admin: 08/10/18 11:07 Dose:  1 applic


Enoxaparin Sodium (Lovenox)  30 mg SC [facility]ILY [facility]


   Last Admin: 08/10/18 09:56 Dose:  30 mg


Gabapentin (Neurontin)  100 mg PO TID [facility]


   Last Admin: 08/10/18 13:08 Dose:  100 mg


Sodium Chloride (Sodium Chloride 0.9%)  1,000 mls @ 75 mls/hr IV .[id number] [facility]


   Last Admin: 08/10/18 07:45 Dose:  75 mls/hr


Piperacillin Sod/Tazobactam Sod (Zosyn 3.375 Gm Iv Premix)  3.375 gm in 50 mls 

@ 100 mls/hr IVPB Q8H [facility]


   PRN Reason: Protocol


   Last Admin: 08/10/18 08:00 Dose:  100 mls/hr


Metronidazole (Flagyl)  500 mg in 100 mls @ 100 mls/hr IVPB Q8H [facility]


   PRN Reason: Protocol


   Last Admin: 08/10/18 08:40 Dose:  100 mls/hr


Fat Emulsion Intravenous (Intralipid 20%)  250 mls @ 42 mls/hr IV DAILY@1800 [facility]


   Stop: 08/10/18 23:58


Chromium/Copper/Manganese/Zinc 1 ml/ Multivitamins/Vitamin C 10 ml/ Sodium 

Chloride 35 meq / Potassium Phosphate 30 mmole / Heparin Sodium (Porcine) 1, 

000 units/ Magnesium Sulfate 6 meq/ Amino Acids  1,032.2278 mls @ 42 mls/hr IV 

.Q24H [facility]


   Stop: 08/11/18 17:59


Insulin Human Regular (Novolin R)  0 unit SC ACHS [facility]


   PRN Reason: Protocol


   Last Admin: 08/10/18 11:33 Dose:  Not Given


Pantoprazole Sodium (Protonix Inj)  40 mg IVP DAILY [facility]


   Last Admin: 08/10/18 09:56 Dose:  40 mg











Physical Exam





- Constitutional


Appears: No Acute Distress





- Head Exam


Head Exam: NORMAL INSPECTION





- Eye Exam


Eye Exam: PERRL





- ENT Exam


ENT Exam: Normal Oropharynx





- Neck Exam


Neck exam: Positive for: Normal Inspection (no)





- Respiratory Exam


Respiratory Exam: Clear to Auscultation Bilateral





- Cardiovascular Exam


Cardiovascular Exam: REGULAR RHYTHM, +S1, +S2





- GI/Abdominal Exam


GI & Abdominal Exam: Hypoactive Bowel Sounds, Soft, Tenderness (AROUND THE 

WOUND. +VE VAC)





- Extremities Exam


Extremities exam: Positive for: pedal pulses present.  Negative for: calf 

tenderness, pedal edema





- Neurological Exam


Neurological exam: Alert, CN II-XII Intact, Oriented x3, Reflexes Normal





- Skin


Skin Exam: Normal Color





Results





- Vital Signs


Recent Vital Signs: 


 Last Vital Signs











Temp  97.9 F   08/10/18 08:00


 


Pulse  72   08/10/18 08:00


 


Resp  20   08/10/18 08:00


 


BP  122/82   08/10/18 08:00


 


Pulse Ox  97   08/10/18 08:00














- Labs


Result Diagrams: 


 08/10/18 09:22





 08/10/18 09:22


Labs: 


 Laboratory Results - last 24 hr











  08/09/18 08/09/18 08/09/18





  11:53 17:22 21:55


 


WBC   


 


RBC   


 


Hgb   


 


Hct   


 


MCV   


 


MCH   


 


MCHC   


 


RDW   


 


Plt Count   


 


MPV   


 


Sodium   


 


Potassium   


 


Chloride   


 


Carbon Dioxide   


 


Anion Gap   


 


BUN   


 


Creatinine   


 


Est GFR ( Amer)   


 


Est GFR (Non-Af Amer)   


 


POC Glucose (mg/dL)   95  82


 


Random Glucose   


 


Calcium   


 


Magnesium   


 


Vitamin B12  224 L  














  08/10/18 08/10/18 08/10/18





  07:14 09:22 09:22


 


WBC   5.2 


 


RBC   4.35 L 


 


Hgb   11.6 L 


 


Hct   35.1 


 


MCV   80.6 


 


MCH   26.7 L 


 


MCHC   33.1 


 


RDW   15.5 H 


 


Plt Count   238 


 


MPV   8.3 


 


Sodium    145


 


Potassium    3.2 L


 


Chloride    109 H


 


Carbon Dioxide    28


 


Anion Gap    11


 


BUN    5 L


 


Creatinine    0.8


 


Est GFR ( Amer)    > 60


 


Est GFR (Non-Af Amer)    > 60


 


POC Glucose (mg/dL)  87  


 


Random Glucose    91


 


Calcium    8.5 L


 


Magnesium    2.0


 


Vitamin B12   














  08/10/18





  11:08


 


WBC 


 


RBC 


 


Hgb 


 


Hct 


 


MCV 


 


MCH 


 


MCHC 


 


RDW 


 


Plt Count 


 


MPV 


 


Sodium 


 


Potassium 


 


Chloride 


 


Carbon Dioxide 


 


Anion Gap 


 


BUN 


 


Creatinine 


 


Est GFR ( Amer) 


 


Est GFR (Non-Af Amer) 


 


POC Glucose (mg/dL)  90


 


Random Glucose 


 


Calcium 


 


Magnesium 


 


Vitamin B12 














- Imaging and Cardiology


  ** CT scan - abdomen/PELVISWITH BY MOUTH CONTRAST


Status: Report reviewed by me ( see full report.)





Assessment & Plan


(1) Enterocutaneous fistula


Assessment and Plan: 


PRESENTLY PATIENT HAS A WOUND vac IN PLACE DRAINING SEROPURULENT SANGUINOUS 

DRAINAGE.


 aGREE WITH PANCULTURES


CONTINUE iv zOSYN 3.375 EVERY 8 HOURLY 8/9/18


CONTINUE iv fLAGYL 500 MG EVERY 8 HOURLY STARTED 8/9/18.





F/U CULTURES TO ADJUST ANTIBIOTICS.


DURATION OF ANTIBIOTIC DEPENDS ON THE HEALING PROCESS OF THE WOUND.


WILL DISCUSS WITH SURGERY.





Status: Acute   





(2) Intra-abdominal infection


Status: Acute   





(3) S/P exploratory laparotomy


Assessment and Plan: 


PATIENT HAD EXPLORATORY LAP/RIGHT HEMICOLECTOMY MAY 2018.


PATIENT HAS HAD DEBRIDEMENTS 2-3 TIMES WITH INTERMITTENT WOUND VAC  PLACEMENTS


WITH POOR HEALING AND PERSISTENT DRAINAGE.


fULL DETAILS OF MANAGEMENT NOT CLEAR / ? WAS PATIENT ON ANTIBIOTICS AS 

OUTPATIENT-


pATIENT POOR HISTORIAN.








Status: Acute

## 2018-08-11 LAB
BUN SERPL-MCNC: 6 MG/DL (ref 9–20)
CALCIUM SERPL-MCNC: 8.3 MG/DL (ref 8.6–10.4)
ERYTHROCYTE [DISTWIDTH] IN BLOOD BY AUTOMATED COUNT: 15.7 % (ref 11.5–14.5)
GFR NON-AFRICAN AMERICAN: > 60
HGB BLD-MCNC: 11.4 G/DL (ref 12–18)
MCH RBC QN AUTO: 26.9 PG (ref 27–31)
MCHC RBC AUTO-ENTMCNC: 33.1 G/DL (ref 33–37)
MCV RBC AUTO: 81.3 FL (ref 80–94)
PLATELET # BLD: 227 K/UL (ref 130–400)
PMV BLD AUTO: 8.6 FL (ref 7.2–11.7)
RBC # BLD AUTO: 4.25 MIL/UL (ref 4.4–5.9)
WBC # BLD AUTO: 5.1 K/UL (ref 4.8–10.8)

## 2018-08-11 RX ADMIN — ENOXAPARIN SODIUM SCH MG: 30 INJECTION SUBCUTANEOUS at 10:35

## 2018-08-11 RX ADMIN — COLLAGENASE SANTYL SCH APPLIC: 250 OINTMENT TOPICAL at 10:35

## 2018-08-11 RX ADMIN — TAZOBACTAM SODIUM AND PIPERACILLIN SODIUM SCH MLS/HR: 375; 3 INJECTION, SOLUTION INTRAVENOUS at 16:07

## 2018-08-11 RX ADMIN — WATER SCH MLS/HR: 1 INJECTION INTRAMUSCULAR; INTRAVENOUS; SUBCUTANEOUS at 08:45

## 2018-08-11 RX ADMIN — WATER SCH MLS/HR: 1 INJECTION INTRAMUSCULAR; INTRAVENOUS; SUBCUTANEOUS at 16:41

## 2018-08-11 RX ADMIN — HUMAN INSULIN SCH: 100 INJECTION, SOLUTION SUBCUTANEOUS at 16:46

## 2018-08-11 RX ADMIN — HUMAN INSULIN SCH: 100 INJECTION, SOLUTION SUBCUTANEOUS at 21:45

## 2018-08-11 RX ADMIN — HUMAN INSULIN SCH UNIT: 100 INJECTION, SOLUTION SUBCUTANEOUS at 08:43

## 2018-08-11 RX ADMIN — TAZOBACTAM SODIUM AND PIPERACILLIN SODIUM SCH MLS/HR: 375; 3 INJECTION, SOLUTION INTRAVENOUS at 08:15

## 2018-08-11 RX ADMIN — HUMAN INSULIN SCH UNIT: 100 INJECTION, SOLUTION SUBCUTANEOUS at 12:09

## 2018-08-11 NOTE — CP.PCM.PN
Subjective





- Date & Time of Evaluation


Date of Evaluation: 08/11/18


Time of Evaluation: 08:53





- Subjective


Subjective: 





Surgery: Dr. Villalobos





Pt seen and examined. No acute events overnight. Pt is doing well and has no 

complaints this AM.











Objective





- Vital Signs/Intake and Output


Vital Signs (last 24 hours): 


 











Temp Pulse Resp BP Pulse Ox


 


 98.3 F   77   20   130/77   97 


 


 08/11/18 00:45  08/11/18 00:45  08/11/18 00:45  08/11/18 00:45  08/11/18 00:45











- Medications


Medications: 


 Current Medications





Collagenase (Santyl)  1 gm TOP DAILY [facility]


   Last Admin: 08/10/18 11:07 Dose:  1 applic


Enoxaparin Sodium (Lovenox)  30 mg SC DAILY [facility]


   Last Admin: 08/10/18 09:56 Dose:  30 mg


Gabapentin (Neurontin)  100 mg PO TID [facility]


   Last Admin: 08/10/18 17:33 Dose:  100 mg


Piperacillin Sod/Tazobactam Sod (Zosyn 3.375 Gm Iv Premix)  3.375 gm in 50 mls 

@ 100 mls/hr IVPB Q8H [facility]


   PRN Reason: Protocol


   Last Admin: 08/11/18 08:15 Dose:  100 mls/hr


Metronidazole (Flagyl)  500 mg in 100 mls @ 100 mls/hr IVPB Q8H [facility]


   PRN Reason: Protocol


   Last Admin: 08/11/18 08:45 Dose:  100 mls/hr


Chromium/Copper/Manganese/Zinc 1 ml/ Multivitamins/Vitamin C 10 ml/ Sodium 

Chloride 35 meq / Potassium Phosphate 30 mmole / Heparin Sodium (Porcine) 1, 

000 units/ Magnesium Sulfate 6 meq/ Amino Acids  1,032.2278 mls @ 42 mls/hr IV 

.Q24H [facility]


   Stop: 08/11/18 17:59


   Last Admin: 08/10/18 17:32 Dose:  42 mls/hr


Potassium Chloride (Potassium Chloride 20 Meq/100 Ml)  20 meq in 100 mls @ 50 

mls/hr IVPB Q2 [facility]


   Stop: 08/11/18 13:59


Insulin Human Regular (Novolin R)  0 unit SC ACHS [facility]


   PRN Reason: Protocol


   Last Admin: 08/11/18 08:43 Dose:  1 unit


Pantoprazole Sodium (Protonix Inj)  40 mg IVP DAILY [facility]


   Last Admin: 08/10/18 09:56 Dose:  40 mg











- Labs


Labs: 


 





 08/11/18 06:18 





 08/11/18 06:18 





 











PT  12.0 SECONDS (9.7-12.2)   08/09/18  11:53    


 


INR  1.1   08/09/18  11:53    


 


APTT  32 SECONDS (21-34)   08/09/18  11:53    














- Constitutional


Appears: Non-toxic, No Acute Distress





- Head Exam


Head Exam: ATRAUMATIC, NORMOCEPHALIC





- Eye Exam


Eye Exam: EOMI





- ENT Exam


ENT Exam: Mucous Membranes Moist





- Neck Exam


Neck Exam: Full ROM





- Respiratory Exam


Respiratory Exam: NORMAL BREATHING PATTERN.  absent: Accessory Muscle Use, 

Respiratory Distress





- GI/Abdominal Exam


GI & Abdominal Exam: Soft.  absent: Distended, Firm, Guarding, Rigid, Tenderness

, Rebound


Additional comments: 





wound vac in place, no output





- Extremities Exam


Extremities Exam: absent: Calf Tenderness, Pedal Edema





- Neurological Exam


Neurological Exam: Alert, Awake, Oriented x3





Assessment and Plan





- Assessment and Plan (Free Text)


Assessment: 





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 

non-healing wound w. concerns for fistula


-NPO w. ice chips


-TPN, correct electrolytes prn, follow up on dietary recommendations


-wound vac in place, no output, will change Q3-4 days


-c/w abx per ID


-GI/DVT ppx


-d/w attending





Zemaitis PGY4

## 2018-08-11 NOTE — CARD
--------------- APPROVED REPORT --------------





Date of service: 08/09/2018



EKG Measurement

Heart [id number]

DC [id number]

[id number]

[id number]

[id number]



<Conclusion>

Normal sinus rhythm

Nonspecific T wave changes  inferior leads.

Borderline EKG

## 2018-08-12 LAB
BUN SERPL-MCNC: 9 MG/DL (ref 9–20)
CALCIUM SERPL-MCNC: 8.2 MG/DL (ref 8.6–10.4)
ERYTHROCYTE [DISTWIDTH] IN BLOOD BY AUTOMATED COUNT: 16.2 % (ref 11.5–14.5)
GFR NON-AFRICAN AMERICAN: > 60
HGB BLD-MCNC: 11.3 G/DL (ref 12–18)
MCH RBC QN AUTO: 27 PG (ref 27–31)
MCHC RBC AUTO-ENTMCNC: 33.2 G/DL (ref 33–37)
MCV RBC AUTO: 81.2 FL (ref 80–94)
PLATELET # BLD: 226 K/UL (ref 130–400)
PMV BLD AUTO: 8.9 FL (ref 7.2–11.7)
RBC # BLD AUTO: 4.19 MIL/UL (ref 4.4–5.9)
WBC # BLD AUTO: 3.9 K/UL (ref 4.8–10.8)

## 2018-08-12 RX ADMIN — WATER SCH MLS/HR: 1 INJECTION INTRAMUSCULAR; INTRAVENOUS; SUBCUTANEOUS at 07:54

## 2018-08-12 RX ADMIN — WATER SCH MLS/HR: 1 INJECTION INTRAMUSCULAR; INTRAVENOUS; SUBCUTANEOUS at 15:52

## 2018-08-12 RX ADMIN — HUMAN INSULIN SCH: 100 INJECTION, SOLUTION SUBCUTANEOUS at 07:39

## 2018-08-12 RX ADMIN — HUMAN INSULIN SCH: 100 INJECTION, SOLUTION SUBCUTANEOUS at 16:59

## 2018-08-12 RX ADMIN — TAZOBACTAM SODIUM AND PIPERACILLIN SODIUM SCH MLS/HR: 375; 3 INJECTION, SOLUTION INTRAVENOUS at 15:52

## 2018-08-12 RX ADMIN — HUMAN INSULIN SCH: 100 INJECTION, SOLUTION SUBCUTANEOUS at 21:38

## 2018-08-12 RX ADMIN — HUMAN INSULIN SCH: 100 INJECTION, SOLUTION SUBCUTANEOUS at 11:33

## 2018-08-12 RX ADMIN — COLLAGENASE SANTYL SCH APPLIC: 250 OINTMENT TOPICAL at 09:12

## 2018-08-12 RX ADMIN — TAZOBACTAM SODIUM AND PIPERACILLIN SODIUM SCH MLS/HR: 375; 3 INJECTION, SOLUTION INTRAVENOUS at 07:54

## 2018-08-12 RX ADMIN — ENOXAPARIN SODIUM SCH MG: 30 INJECTION SUBCUTANEOUS at 09:13

## 2018-08-12 RX ADMIN — WATER SCH MLS/HR: 1 INJECTION INTRAMUSCULAR; INTRAVENOUS; SUBCUTANEOUS at 01:00

## 2018-08-12 RX ADMIN — TAZOBACTAM SODIUM AND PIPERACILLIN SODIUM SCH MLS/HR: 375; 3 INJECTION, SOLUTION INTRAVENOUS at 00:25

## 2018-08-12 NOTE — CP.PCM.PN
Subjective





- Date & Time of Evaluation


Date of Evaluation: 08/12/18


Time of Evaluation: 23:46





- Subjective


Subjective: 





CHIEF COMPLAINTS TODAY :


afebrile.


c/o feeling hungry


on TPN.


NO ABDOMINAL PAIN





ROS.


HEENT :  N.


Resp :       No cough, wheezing ,pleuritic CP ,or hemoptysis 


Cardio :     No anginal  CP, PND, orthopnea, palpitation 


GI :           No abd.pain, n/v ,diarrhea or GI bleeding .


CNS : No headache, vertigo, focal deficit.


Musculoskel :  No joint swelling ,


Derm :        No rash 


Psych :     Normal affect.


Ext :  No  swelling ,calf pain 





PE.


Pt. is alert awake in no distress.


V.S  As noted in the chart 


Head ,ear nose,throat and eyes : Normal.


Neck : Supple with normal carotids.


Lungs: Clear air entry.


Heart : S1 & S2 normal with S4. No murmur.


Abd : MID LINE INCISION   +VE WOUND VAC DRAINING 


          SEROSANGUINOUS DRAINAGE


Neuro : Moves all ext. with no localized deficit.


Ext : No edema with intact pulses.Non tender calves 


Derm : No rashes or decubitus ulcer.





LABS/RADIOLOGY:


wbc 3.9, h/h 11.3/34.0


creat 0.8


blood cultures -ve for 3 days


Abdominal wound culture 8/10/18  -ve growth


Urine culture 8/10/18  -ve growth





 





Objective





- Vital Signs/Intake and Output


Vital Signs (last 24 hours): 


 











Temp Pulse Resp BP Pulse Ox


 


 97.7 F   69   20   152/76 H  97 


 


 08/12/18 15:00  08/12/18 15:00  08/12/18 15:00  08/12/18 15:00  08/12/18 15:00








Intake and Output: 


 











 08/12/18 08/13/18





 18:59 06:59


 


Intake Total 600 825


 


Balance 600 825














- Medications


Medications: 


 Current Medications





Collagenase (Santyl)  1 gm TOP DAILY [facility]


   Last Admin: 08/12/18 09:12 Dose:  1 applic


Enoxaparin Sodium (Lovenox)  30 mg SC DAILY [facility]


   Last Admin: 08/12/18 09:13 Dose:  30 mg


Gabapentin (Neurontin)  200 mg PO TID [facility]


   Last Admin: 08/12/18 17:18 Dose:  200 mg


Piperacillin Sod/Tazobactam Sod (Zosyn 3.375 Gm Iv Premix)  3.375 gm in 50 mls 

@ 100 mls/hr IVPB Q8H [facility]


   PRN Reason: Protocol


   Last Admin: 08/12/18 15:52 Dose:  100 mls/hr


Sodium Chloride 35 meq/Potassium Phosphate 45 mmole/Magnesium Sulfate 6 meq/

Chromium/Copper/Manganese/Seleni/Zn 1 ml/ Heparin Sodium (Porcine) 1,000 units/ 

Amino Acids  1,027.2278 mls @ 75 mls/hr IV .[id number] [facility]


   Stop: 08/13/18 07:41


   Last Admin: 08/12/18 17:18 Dose:  75 mls/hr


Sodium Chloride 35 meq/Potassium Phosphate 45 mmole/Magnesium Sulfate 6 meq/

Chromium/Copper/Manganese/Seleni/Zn 1 ml/ Heparin Sodium (Porcine) 1,000 units/ 

Amino Acids  1,027.2278 mls @ 75 mls/hr IV .[id number] [facility]


   Stop: 08/13/18 21:23


Insulin Human Regular (Novolin R)  0 unit SC ACHS [facility]


   PRN Reason: Protocol


   Last Admin: 08/12/18 21:38 Dose:  Not Given


Pantoprazole Sodium (Protonix Inj)  40 mg IVP DAILY [facility]


   Last Admin: 08/12/18 09:12 Dose:  40 mg











- Labs


Labs: 


 





 08/12/18 07:52 





 08/12/18 07:52 





 











PT  12.0 SECONDS (9.7-12.2)   08/09/18  11:53    


 


INR  1.1   08/09/18  11:53    


 


APTT  32 SECONDS (21-34)   08/09/18  11:53    














Assessment and Plan


(1) Enterocutaneous fistula


Assessment & Plan: 


patient with wound VAC +VE DRAINAGE. 8/10/18


DC IV FLAGYL IN VIEW OF LEUKOPENIA





ON IV ZOSYN PRESENTLY 3.375 EVERY 8 HOURLY 8/9/18.


WILL DISCUSS.





Status: Acute   





(2) Intra-abdominal infection


Status: Acute   





(3) S/P exploratory laparotomy


Assessment & Plan: 


PATIENT HAD EXPLORATORY LAP/RIGHT HEMICOLECTOMY MAY 2018.


Status: Acute

## 2018-08-12 NOTE — CP.PCM.PN
Subjective





- Date & Time of Evaluation


Date of Evaluation: 08/12/18


Time of Evaluation: 08:50





- Subjective


Subjective: 





Surgery: Dr. Villalobos





Pt seen and examined. No acute events overnight. No F/C. No abd pain. No N/V. 

Pt is hungry and would like to eat.





Objective





- Vital Signs/Intake and Output


Vital Signs (last 24 hours): 


 











Temp Pulse Resp BP Pulse Ox


 


 98.2 F   77   20   123/79   95 


 


 08/12/18 00:00  08/12/18 00:00  08/12/18 00:00  08/12/18 00:00  08/12/18 00:00








Intake and Output: 


 











 08/12/18 08/12/18





 06:59 18:59


 


Intake Total 1485 


 


Output Total 0 


 


Balance 1485 














- Medications


Medications: 


 Current Medications





Collagenase (Santyl)  1 gm TOP DAILY [facility]


   Last Admin: 08/11/18 10:35 Dose:  1 applic


Enoxaparin Sodium (Lovenox)  30 mg SC DAILY [facility]


   Last Admin: 08/11/18 10:35 Dose:  30 mg


Gabapentin (Neurontin)  100 mg PO TID [facility]


   Last Admin: 08/11/18 17:10 Dose:  100 mg


Piperacillin Sod/Tazobactam Sod (Zosyn 3.375 Gm Iv Premix)  3.375 gm in 50 mls 

@ 100 mls/hr IVPB Q8H [facility]


   PRN Reason: Protocol


   Last Admin: 08/12/18 07:54 Dose:  100 mls/hr


Metronidazole (Flagyl)  500 mg in 100 mls @ 100 mls/hr IVPB Q8H [facility]


   PRN Reason: Protocol


   Last Admin: 08/12/18 07:54 Dose:  100 mls/hr


Sodium Chloride 35 meq/Potassium Phosphate 45 mmole/Magnesium Sulfate 6 meq/

Chromium/Copper/Manganese/Seleni/Zn 1 ml/ Heparin Sodium (Porcine) 1,000 units/ 

Amino Acids  1,027.2278 mls @ 75 mls/hr IV .[id number] [facility]


   Stop: 08/12/18 17:59


   Last Admin: 08/12/18 07:47 Dose:  75 mls/hr


Insulin Human Regular (Novolin R)  0 unit SC ACHS [facility]


   PRN Reason: Protocol


   Last Admin: 08/12/18 07:39 Dose:  Not Given


Pantoprazole Sodium (Protonix Inj)  40 mg IVP DAILY [facility]


   Last Admin: 08/11/18 10:34 Dose:  40 mg











- Labs


Labs: 


 





 08/12/18 07:52 





 08/12/18 07:52 





 











PT  12.0 SECONDS (9.7-12.2)   08/09/18  11:53    


 


INR  1.1   08/09/18  11:53    


 


APTT  32 SECONDS (21-34)   08/09/18  11:53    














- Constitutional


Appears: Non-toxic, No Acute Distress





- Head Exam


Head Exam: ATRAUMATIC, NORMOCEPHALIC





- Eye Exam


Eye Exam: EOMI





- ENT Exam


ENT Exam: Mucous Membranes Moist





- Respiratory Exam


Respiratory Exam: NORMAL BREATHING PATTERN.  absent: Accessory Muscle Use, 

Respiratory Distress





- GI/Abdominal Exam


GI & Abdominal Exam: Soft.  absent: Distended, Firm, Guarding, Rigid, Tenderness

, Rebound


Additional comments: 





midline incision w. wound vac in place





- Extremities Exam


Extremities Exam: absent: Calf Tenderness, Pedal Edema


Additional comments: 





R thigh tender





- Neurological Exam


Neurological Exam: Alert, Awake, Oriented x3





Assessment and Plan





- Assessment and Plan (Free Text)


Assessment: 








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 

non-healing wound w. concerns for fistula


-NPO w. ice chips


-TPN, correct electrolytes prn


-wound vac in place, no output, will change Q3-4 days


-Urine Cx and Wound Cx negative, prelim Blood cx NGTD


-c/w abx per ID


-encourage OOB and ambulation


-GI/DVT ppx


-d/w attending





[place] PGY4

## 2018-08-13 LAB
BUN SERPL-MCNC: 10 MG/DL (ref 9–20)
CALCIUM SERPL-MCNC: 8.1 MG/DL (ref 8.6–10.4)
ERYTHROCYTE [DISTWIDTH] IN BLOOD BY AUTOMATED COUNT: 16.2 % (ref 11.5–14.5)
GFR NON-AFRICAN AMERICAN: > 60
HGB BLD-MCNC: 11.7 G/DL (ref 12–18)
MCH RBC QN AUTO: 27 PG (ref 27–31)
MCHC RBC AUTO-ENTMCNC: 33 G/DL (ref 33–37)
MCV RBC AUTO: 81.8 FL (ref 80–94)
PLATELET # BLD: 216 K/UL (ref 130–400)
PMV BLD AUTO: 8.8 FL (ref 7.2–11.7)
RBC # BLD AUTO: 4.35 MIL/UL (ref 4.4–5.9)
WBC # BLD AUTO: 3.9 K/UL (ref 4.8–10.8)

## 2018-08-13 RX ADMIN — SODIUM CHLORIDE SCH MLS/HR: 234 INJECTION, SOLUTION INTRAVENOUS at 17:47

## 2018-08-13 RX ADMIN — TAZOBACTAM SODIUM AND PIPERACILLIN SODIUM SCH MLS/HR: 375; 3 INJECTION, SOLUTION INTRAVENOUS at 07:58

## 2018-08-13 RX ADMIN — COLLAGENASE SANTYL SCH APPLIC: 250 OINTMENT TOPICAL at 09:13

## 2018-08-13 RX ADMIN — ENOXAPARIN SODIUM SCH MG: 30 INJECTION SUBCUTANEOUS at 09:07

## 2018-08-13 RX ADMIN — TAZOBACTAM SODIUM AND PIPERACILLIN SODIUM SCH MLS/HR: 375; 3 INJECTION, SOLUTION INTRAVENOUS at 00:01

## 2018-08-13 RX ADMIN — HUMAN INSULIN SCH: 100 INJECTION, SOLUTION SUBCUTANEOUS at 21:22

## 2018-08-13 RX ADMIN — HUMAN INSULIN SCH: 100 INJECTION, SOLUTION SUBCUTANEOUS at 11:51

## 2018-08-13 RX ADMIN — HUMAN INSULIN SCH: 100 INJECTION, SOLUTION SUBCUTANEOUS at 07:48

## 2018-08-13 RX ADMIN — TAZOBACTAM SODIUM AND PIPERACILLIN SODIUM SCH MLS/HR: 375; 3 INJECTION, SOLUTION INTRAVENOUS at 17:00

## 2018-08-13 RX ADMIN — HUMAN INSULIN SCH UNIT: 100 INJECTION, SOLUTION SUBCUTANEOUS at 17:00

## 2018-08-13 RX ADMIN — Medication SCH MG: at 13:24

## 2018-08-13 NOTE — CP.PCM.PN
Subjective





- Date & Time of Evaluation


Date of Evaluation: 08/13/18


Time of Evaluation: 23:28





- Subjective


Subjective: 





CHIEF COMPLAINTS TODAY :


afebrile.


c/o feeling hungry


ADVANCING DIETAS PER SURGERY


NO ABDOMINAL PAIN





ROS.


HEENT :  N.


Resp :       No cough, wheezing ,pleuritic CP ,or hemoptysis 


Cardio :     No anginal  CP, PND, orthopnea, palpitation 


GI :           No abd.pain, n/v ,diarrhea or GI bleeding .


CNS : No headache, vertigo, focal deficit.


Musculoskel :  No joint swelling ,


Derm :        No rash 


Psych :     Normal affect.


Ext :  No  swelling ,calf pain 





PE.


Pt. is alert awake in no distress.


V.S  As noted in the chart 


Head ,ear nose,throat and eyes : Normal.


Neck : Supple with normal carotids.


Lungs: Clear air entry.


Heart : S1 & S2 normal with S4. No murmur.


Abd : MID LINE INCISION   +VE WOUND VAC WITH MINIMAL 


         DRAINAGE


Neuro : Moves all ext. with no localized deficit.


Ext : No edema with intact pulses.Non tender calves 


Derm : No rashes or decubitus ulcer.





LABS/RADIOLOGY:





blood cultures -ve for 3 days


Abdominal wound culture 8/10/18  -ve growth


Urine culture 8/10/18  -ve growth








Objective





- Vital Signs/Intake and Output


Vital Signs (last 24 hours): 


 











Temp Pulse Resp BP Pulse Ox


 


 98.4 F   81   20   135/88   98 


 


 08/13/18 16:10  08/13/18 16:10  08/13/18 16:10  08/13/18 16:10  08/13/18 16:10








Intake and Output: 


 











 08/13/18 08/14/18





 18:59 06:59


 


Intake Total 650 


 


Balance 650 














- Medications


Medications: 


 Current Medications





Collagenase (Santyl)  1 gm TOP DAILY [facility]


   Last Admin: 08/13/18 09:13 Dose:  1 applic


Enoxaparin Sodium (Lovenox)  30 mg SC DAILY [facility]


   Last Admin: 08/13/18 09:07 Dose:  30 mg


Gabapentin (Neurontin)  200 mg PO TID [facility]


   Last Admin: 08/13/18 17:22 Dose:  200 mg


Piperacillin Sod/Tazobactam Sod (Zosyn 3.375 Gm Iv Premix)  3.375 gm in 50 mls 

@ 100 mls/hr IVPB Q8H [facility]


   PRN Reason: Protocol


   Last Admin: 08/13/18 17:00 Dose:  100 mls/hr


Sodium Chloride 35 meq/Potassium Phosphate 45 mmole/Magnesium Sulfate 6 meq/

Heparin Sodium (Porcine) 1,000 units/ Chromium/Copper/Manganese/Zinc 1 ml/

Multivitamins/Vitamin C 10 ml/Amino Acids  1,037.2278 mls @ 75 mls/hr IV 

.[id number] [facility]


   Stop: 08/14/18 07:50


   Last Admin: 08/13/18 17:47 Dose:  75 mls/hr


Sodium Chloride 35 meq/Potassium Phosphate 45 mmole/Magnesium Sulfate 6 meq/

Heparin Sodium (Porcine) 1,000 units/ Chromium/Copper/Manganese/Zinc 1 ml/ 

Amino Acids  1,027.2278 mls @ 75 mls/hr IV .[id number] [facility]


   Stop: 08/14/18 18:00


Insulin Human Regular (Novolin R)  0 unit SC ACHS [facility]


   PRN Reason: Protocol


   Last Admin: 08/13/18 21:22 Dose:  Not Given


Pantoprazole Sodium (Protonix Inj)  40 mg IVP DAILY [facility]


   Last Admin: 08/13/18 09:07 Dose:  40 mg


Saccharomyces Boulardii (Florastor)  250 mg PO DAILY [facility]


   Last Admin: 08/13/18 13:24 Dose:  250 mg











- Labs


Labs: 


 





 08/13/18 07:09 





 08/13/18 07:09 





 











PT  12.0 SECONDS (9.7-12.2)   08/09/18  11:53    


 


INR  1.1   08/09/18  11:53    


 


APTT  32 SECONDS (21-34)   08/09/18  11:53    














Assessment and Plan


(1) Enterocutaneous fistula


Assessment & Plan: 


ON IV ZOSYN PRESENTLY 3.375 EVERY 8 HOURLY 8/9/18.





CASE  DISCUSSED  WITH SURGICAL TEAM.





CAN DC IV ZOSYN ON DISCHARGE AND CHANGE TO 


INVANZ 1GM IVPB QD DAILY X7 DAYS


F/U PO AUGMENTIN 875MG PO BID X 14DAYS..


PO FLORSTAR 250MG PO HS DAILY X 21DAYS





F/U CBC /BMP/LFTS IN OUTPT. CLINIC.


 LWC / WOUND VAC CHANGE AS PER  SURGERY.





Status: Acute   





(2) Intra-abdominal infection


Status: Acute   





(3) S/P exploratory laparotomy


Status: Acute

## 2018-08-13 NOTE — CP.PCM.PN
Subjective





- Date & Time of Evaluation


Date of Evaluation: 08/13/18


Time of Evaluation: 15:06





- Subjective


Subjective: 





Surgery: Dr. Villalobos





Pt seen and examined. No complaints. Pt wants to eat more. 





Objective





- Vital Signs/Intake and Output


Vital Signs (last 24 hours): 


 











Temp Pulse Resp BP Pulse Ox


 


 97.5 F L  65   20   136/85   96 


 


 08/13/18 10:00  08/13/18 10:00  08/13/18 10:00  08/13/18 10:00  08/13/18 10:00








Intake and Output: 


 











 08/13/18 08/13/18





 06:59 18:59


 


Intake Total 1475 650


 


Balance 1475 650














- Medications


Medications: 


 Current Medications





Collagenase (Santyl)  1 gm TOP [facility]ILY [facility]


   Last Admin: 08/13/18 09:13 Dose:  1 applic


Enoxaparin Sodium (Lovenox)  30 mg SC DAILY [facility]


   Last Admin: 08/13/18 09:07 Dose:  30 mg


Gabapentin (Neurontin)  200 mg PO TID [facility]


   Last Admin: 08/13/18 13:23 Dose:  200 mg


Piperacillin Sod/Tazobactam Sod (Zosyn 3.375 Gm Iv Premix)  3.375 gm in 50 mls 

@ 100 mls/hr IVPB Q8H [facility]


   PRN Reason: Protocol


   Last Admin: 08/13/18 07:58 Dose:  100 mls/hr


Sodium Chloride 35 meq/Potassium Phosphate 45 mmole/Magnesium Sulfate 6 meq/

Chromium/Copper/Manganese/Seleni/Zn 1 ml/ Heparin Sodium (Porcine) 1,000 units/ 

Amino Acids  1,027.2278 mls @ 75 mls/hr IV .[id number] [facility]


   Stop: 08/13/18 21:23


   Last Admin: 08/13/18 07:52 Dose:  75 mls/hr


Fat Emulsion Intravenous (Intralipid 20%)  250 mls @ 75 mls/hr IV ONCE ONE


   Stop: 08/13/18 21:19


Sodium Chloride 35 meq/Potassium Phosphate 45 mmole/Magnesium Sulfate 6 meq/

Heparin Sodium (Porcine) 1,000 units/ Chromium/Copper/Manganese/Zinc 1 ml/

Multivitamins/Vitamin C 10 ml/Amino Acids  1,037.2278 mls @ 75 mls/hr IV 

.[id number] [facility]


   Stop: 08/14/18 07:50


Sodium Chloride 35 meq/Potassium Phosphate 45 mmole/Magnesium Sulfate 6 meq/

Heparin Sodium (Porcine) 1,000 units/ Chromium/Copper/Manganese/Zinc 1 ml/ 

Amino Acids  1,027.2278 mls @ 75 mls/hr IV .[id number] [facility]


   Stop: 08/14/18 18:00


Insulin Human Regular (Novolin R)  0 unit SC ACHS [facility]


   PRN Reason: Protocol


   Last Admin: 08/13/18 11:51 Dose:  Not Given


Pantoprazole Sodium (Protonix Inj)  40 mg IVP DAILY [facility]


   Last Admin: 08/13/18 09:07 Dose:  40 mg


Saccharomyces Boulardii (Florastor)  250 mg PO DAILY [facility]


   Last Admin: 08/13/18 13:24 Dose:  250 mg











- Labs


Labs: 


 





 08/13/18 07:09 





 08/13/18 07:09 





 











PT  12.0 SECONDS (9.7-12.2)   08/09/18  11:53    


 


INR  1.1   08/09/18  11:53    


 


APTT  32 SECONDS (21-34)   08/09/18  11:53    














- Constitutional


Appears: Non-toxic, No Acute Distress





- Head Exam


Head Exam: ATRAUMATIC, NORMOCEPHALIC





- Eye Exam


Eye Exam: EOMI





- ENT Exam


ENT Exam: Mucous Membranes Moist





- Neck Exam


Neck Exam: Full ROM





- Respiratory Exam


Respiratory Exam: NORMAL BREATHING PATTERN.  absent: Accessory Muscle Use, 

Respiratory Distress





- GI/Abdominal Exam


GI & Abdominal Exam: Soft.  absent: Distended, Firm, Guarding, Rigid, Tenderness

, Rebound


Additional comments: 





wound vac in place





- Extremities Exam


Extremities Exam: Calf Tenderness.  absent: Pedal Edema





- Neurological Exam


Neurological Exam: Alert, Awake, Oriented x3





- Psychiatric Exam


Psychiatric exam: Normal Affect, Normal Mood





- Skin


Skin Exam: Dry, Normal Color, Warm





Assessment and Plan





- Assessment and Plan (Free Text)


Assessment: 





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 

non-healing wound


-FLD and TPN, correct electrolytes prn


-wound vac in place, will change Q3-4 days


-Urine Cx and Wound Cx negative, prelim Blood cx NGTD


-Stool Cx ordered, will f/u results


-c/w abx per ID


-encourage OOB and ambulation


-GI/DVT ppx


-d/w attending





[facility] PGY4

## 2018-08-14 LAB
ALBUMIN SERPL-MCNC: 2.9 G/DL (ref 3.5–5)
ALBUMIN/GLOB SERPL: 1 {RATIO} (ref 1–2.1)
ALT SERPL-CCNC: 30 U/L (ref 21–72)
AST SERPL-CCNC: 32 U/L (ref 17–59)
BUN SERPL-MCNC: 12 MG/DL (ref 9–20)
CALCIUM SERPL-MCNC: 8.3 MG/DL (ref 8.6–10.4)
ERYTHROCYTE [DISTWIDTH] IN BLOOD BY AUTOMATED COUNT: 16.6 % (ref 11.5–14.5)
GFR NON-AFRICAN AMERICAN: > 60
HGB BLD-MCNC: 11.9 G/DL (ref 12–18)
MCH RBC QN AUTO: 26.8 PG (ref 27–31)
MCHC RBC AUTO-ENTMCNC: 33 G/DL (ref 33–37)
MCV RBC AUTO: 81.1 FL (ref 80–94)
PLATELET # BLD: 217 K/UL (ref 130–400)
PMV BLD AUTO: 9.3 FL (ref 7.2–11.7)
RBC # BLD AUTO: 4.43 MIL/UL (ref 4.4–5.9)
WBC # BLD AUTO: 4.1 K/UL (ref 4.8–10.8)

## 2018-08-14 RX ADMIN — ENOXAPARIN SODIUM SCH MG: 30 INJECTION SUBCUTANEOUS at 10:19

## 2018-08-14 RX ADMIN — COLLAGENASE SANTYL SCH APPLIC: 250 OINTMENT TOPICAL at 10:19

## 2018-08-14 RX ADMIN — TAZOBACTAM SODIUM AND PIPERACILLIN SODIUM SCH MLS/HR: 375; 3 INJECTION, SOLUTION INTRAVENOUS at 16:00

## 2018-08-14 RX ADMIN — HUMAN INSULIN SCH: 100 INJECTION, SOLUTION SUBCUTANEOUS at 16:30

## 2018-08-14 RX ADMIN — Medication SCH MG: at 10:18

## 2018-08-14 RX ADMIN — TAZOBACTAM SODIUM AND PIPERACILLIN SODIUM SCH MLS/HR: 375; 3 INJECTION, SOLUTION INTRAVENOUS at 08:06

## 2018-08-14 RX ADMIN — TAZOBACTAM SODIUM AND PIPERACILLIN SODIUM SCH MLS/HR: 375; 3 INJECTION, SOLUTION INTRAVENOUS at 00:16

## 2018-08-14 RX ADMIN — SODIUM CHLORIDE SCH: 234 INJECTION, SOLUTION INTRAVENOUS at 07:56

## 2018-08-14 RX ADMIN — HUMAN INSULIN SCH: 100 INJECTION, SOLUTION SUBCUTANEOUS at 22:06

## 2018-08-14 RX ADMIN — HUMAN INSULIN SCH: 100 INJECTION, SOLUTION SUBCUTANEOUS at 12:27

## 2018-08-14 RX ADMIN — HUMAN INSULIN SCH UNIT: 100 INJECTION, SOLUTION SUBCUTANEOUS at 08:06

## 2018-08-14 NOTE — CP.PCM.PN
Subjective





- Date & Time of Evaluation


Date of Evaluation: 08/14/18


Time of Evaluation: 10:34





- Subjective


Subjective: 





Surgery: Dr. Villalobos





Pt seen and examined. No acute events overnight. Pt is hungry and wants to eat 

more.





Objective





- Vital Signs/Intake and Output


Vital Signs (last 24 hours): 


 











Temp Pulse Resp BP Pulse Ox


 


 97.7 F   75   20   139/80   95 


 


 08/13/18 23:52  08/13/18 23:52  08/13/18 23:52  08/13/18 23:52  08/13/18 23:52











- Medications


Medications: 


 Current Medications





Collagenase (Santyl)  1 gm TOP DAILY [facility]


   Last Admin: 08/14/18 10:19 Dose:  1 applic


Enoxaparin Sodium (Lovenox)  30 mg SC DAILY [facility]


   Last Admin: 08/14/18 10:19 Dose:  30 mg


Gabapentin (Neurontin)  300 mg PO TID [facility]


   Last Admin: 08/14/18 10:18 Dose:  300 mg


Piperacillin Sod/Tazobactam Sod (Zosyn 3.375 Gm Iv Premix)  3.375 gm in 50 mls 

@ 100 mls/hr IVPB Q8H [facility]


   PRN Reason: Protocol


   Last Admin: 08/14/18 08:06 Dose:  100 mls/hr


Sodium Chloride 35 meq/Potassium Phosphate 45 mmole/Magnesium Sulfate 6 meq/

Heparin Sodium (Porcine) 1,000 units/ Chromium/Copper/Manganese/Zinc 1 ml/ 

Amino Acids  1,027.2278 mls @ 75 mls/hr IV .[id number] [facility]


   Stop: 08/14/18 18:00


   Last Admin: 08/14/18 08:05 Dose:  75 mls/hr


Insulin Human Regular (Novolin R)  0 unit SC ACHS [facility]


   PRN Reason: Protocol


   Last Admin: 08/14/18 08:06 Dose:  1 unit


Pantoprazole Sodium (Protonix Inj)  40 mg IVP DAILY [facility]


   Last Admin: 08/14/18 10:18 Dose:  40 mg


Saccharomyces Boulardii (Florastor)  250 mg PO DAILY [facility]


   Last Admin: 08/14/18 10:18 Dose:  250 mg











- Labs


Labs: 


 





 08/14/18 09:54 





 08/14/18 09:54 





 











PT  12.0 SECONDS (9.7-12.2)   08/09/18  11:53    


 


INR  1.1   08/09/18  11:53    


 


APTT  32 SECONDS (21-34)   08/09/18  11:53    














- Constitutional


Appears: Non-toxic, No Acute Distress





- Head Exam


Head Exam: ATRAUMATIC, NORMOCEPHALIC





- Eye Exam


Eye Exam: EOMI





- ENT Exam


ENT Exam: Mucous Membranes Moist





- Neck Exam


Neck Exam: Full ROM





- Respiratory Exam


Respiratory Exam: NORMAL BREATHING PATTERN.  absent: Accessory Muscle Use, 

Respiratory Distress





- Cardiovascular Exam


Cardiovascular Exam: REGULAR RHYTHM





- GI/Abdominal Exam


GI & Abdominal Exam: Soft.  absent: Distended, Firm, Guarding, Rigid, Tenderness

, Rebound


Additional comments: 





wound vac in place





- Extremities Exam


Extremities Exam: absent: Calf Tenderness, Pedal Edema





- Neurological Exam


Neurological Exam: Alert, Awake, Oriented x3





- Psychiatric Exam


Psychiatric exam: Normal Affect, Normal Mood





Assessment and Plan





- Assessment and Plan (Free Text)


Assessment: 





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 

non-healing wound


-Albumin/Total Protein trending down. Will D/C TPN and start low residue diet


-wound vac in place, will change Q3-4 days


-Urine Cx and Wound Cx negative, prelim Blood cx NGTD, awaiting results of 

stool studies


-c/w abx per ID


-encourage OOB and ambulation


-GI/DVT ppx


-d/w attending





Mark Anthonyitis PGY4

## 2018-08-15 VITALS
SYSTOLIC BLOOD PRESSURE: 118 MMHG | DIASTOLIC BLOOD PRESSURE: 70 MMHG | TEMPERATURE: 98.1 F | OXYGEN SATURATION: 97 % | HEART RATE: 82 BPM

## 2018-08-15 LAB
ALBUMIN SERPL-MCNC: 2.9 G/DL (ref 3.5–5)
ALBUMIN/GLOB SERPL: 1.1 {RATIO} (ref 1–2.1)
ALT SERPL-CCNC: 32 U/L (ref 21–72)
AST SERPL-CCNC: 35 U/L (ref 17–59)
BUN SERPL-MCNC: 11 MG/DL (ref 9–20)
CALCIUM SERPL-MCNC: 8.7 MG/DL (ref 8.6–10.4)
GFR NON-AFRICAN AMERICAN: > 60
VIT C SERPL-MCNC: 0.1 MG/DL (ref 0.2–2.1)

## 2018-08-15 RX ADMIN — TAZOBACTAM SODIUM AND PIPERACILLIN SODIUM SCH MLS/HR: 375; 3 INJECTION, SOLUTION INTRAVENOUS at 00:00

## 2018-08-15 RX ADMIN — ENOXAPARIN SODIUM SCH MG: 30 INJECTION SUBCUTANEOUS at 09:09

## 2018-08-15 RX ADMIN — HUMAN INSULIN SCH: 100 INJECTION, SOLUTION SUBCUTANEOUS at 16:32

## 2018-08-15 RX ADMIN — HUMAN INSULIN SCH: 100 INJECTION, SOLUTION SUBCUTANEOUS at 11:15

## 2018-08-15 RX ADMIN — HUMAN INSULIN SCH: 100 INJECTION, SOLUTION SUBCUTANEOUS at 07:42

## 2018-08-15 RX ADMIN — TAZOBACTAM SODIUM AND PIPERACILLIN SODIUM SCH MLS/HR: 375; 3 INJECTION, SOLUTION INTRAVENOUS at 16:34

## 2018-08-15 RX ADMIN — TAZOBACTAM SODIUM AND PIPERACILLIN SODIUM SCH MLS/HR: 375; 3 INJECTION, SOLUTION INTRAVENOUS at 07:40

## 2018-08-15 RX ADMIN — COLLAGENASE SANTYL SCH APPLIC: 250 OINTMENT TOPICAL at 09:11

## 2018-08-15 NOTE — CP.PCM.DIS
Provider





- Provider


Date of Admission: 


08/09/18 13:39





Attending physician: 


Jas Villalobos MD





Time Spent in preparation of Discharge (in minutes): 75





Hospital Course





- Lab Results


Lab Results: 


 Micro Results





08/09/18 14:00   Blood   Blood Culture - Final


                            NO GROWTH AFTER 5 DAYS


08/09/18 14:00   Blood   Gram Stain - Final


                            TEST NOT PERFORMED


08/13/18 22:28   Stool   Ova and Parasite Concentrate Exam - Final


08/09/18 12:00   Blood   Blood Culture - Final


                            NO GROWTH AFTER 5 DAYS


08/09/18 12:00   Blood   Gram Stain - Final


                            TEST NOT PERFORMED


08/10/18 04:03   Abdomen   Gram Stain - Final


08/10/18 04:03   Abdomen   Wound Culture - Final


                            No growth.


08/10/18 04:03   Urine,Clean Catch   Urine Culture - Final


                            No Growth (<1,000 CFU/ML)





 Most Recent Lab Values











WBC  4.1 K/uL (4.8-10.8)  L  08/14/18  09:54    


 


RBC  4.43 Mil/uL (4.40-5.90)   08/14/18  09:54    


 


Hgb  11.9 g/dL (12.0-18.0)  L  08/14/18  09:54    


 


Hct  35.9 % (35.0-51.0)   08/14/18  09:54    


 


MCV  81.1 fL (80.0-94.0)   08/14/18  09:54    


 


MCH  26.8 pg (27.0-31.0)  L  08/14/18  09:54    


 


MCHC  33.0 g/dL (33.0-37.0)   08/14/18  09:54    


 


RDW  16.6 % (11.5-14.5)  H  08/14/18  09:54    


 


Plt Count  217 K/uL (130-400)   08/14/18  09:54    


 


MPV  9.3 fL (7.2-11.7)   08/14/18  09:54    


 


Neut % (Auto)  65.0 % (50.0-75.0)   08/09/18  11:53    


 


Lymph % (Auto)  25.0 % (20.0-40.0)   08/09/18  11:53    


 


Mono % (Auto)  6.4 % (0.0-10.0)   08/09/18  11:53    


 


Eos % (Auto)  3.3 % (0.0-4.0)   08/09/18  11:53    


 


Baso % (Auto)  0.3 % (0.0-2.0)   08/09/18  11:53    


 


Neut # (Auto)  4.1 K/uL (1.8-7.0)   08/09/18  11:53    


 


Lymph # (Auto)  1.6 K/uL (1.0-4.3)   08/09/18  11:53    


 


Mono # (Auto)  0.4 K/uL (0.0-0.8)   08/09/18  11:53    


 


Eos # (Auto)  0.2 K/uL (0.0-0.7)   08/09/18  11:53    


 


Baso # (Auto)  0.0 K/uL (0.0-0.2)   08/09/18  11:53    


 


PT  12.0 SECONDS (9.7-12.2)   08/09/18  11:53    


 


INR  1.1   08/09/18  11:53    


 


APTT  32 SECONDS (21-34)   08/09/18  11:53    


 


Sodium  142 mmol/L (132-148)   08/15/18  07:21    


 


Potassium  3.9 mmol/L (3.6-5.2)   08/15/18  07:21    


 


Chloride  107 mmol/L ()   08/15/18  07:21    


 


Carbon Dioxide  29 mmol/L (22-30)   08/15/18  07:21    


 


Anion Gap  10  (10-20)   08/15/18  07:21    


 


BUN  11 mg/dL (9-20)   08/15/18  07:21    


 


Creatinine  1.1 mg/dL (0.8-1.5)   08/15/18  07:21    


 


Est GFR ( Amer)  > 60   08/15/18  07:21    


 


Est GFR (Non-Af Amer)  > 60   08/15/18  07:21    


 


POC Glucose (mg/dL)  103 mg/dL ()   08/15/18  16:14    


 


Random Glucose  95 mg/dL ()   08/15/18  07:21    


 


Calcium  8.7 mg/dl (8.6-10.4)   08/15/18  07:21    


 


Phosphorus  3.0 mg/dL (2.5-4.5)   08/15/18  07:21    


 


Magnesium  2.3 mg/dL (1.6-2.3)   08/15/18  07:21    


 


Total Bilirubin  0.5 mg/dL (0.2-1.3)   08/15/18  07:21    


 


AST  35 U/L (17-59)   08/15/18  07:21    


 


ALT  32 U/L (21-72)   08/15/18  07:21    


 


Alkaline Phosphatase  120 U/L ()   08/15/18  07:21    


 


Total Protein  5.6 g/dL (6.3-8.3)  L  08/15/18  07:21    


 


Albumin  2.9 g/dL (3.5-5.0)  L  08/15/18  07:21    


 


Globulin  2.7 gm/dL (2.2-3.9)   08/15/18  07:21    


 


Albumin/Globulin Ratio  1.1  (1.0-2.1)   08/15/18  07:21    


 


Prealbumin  24.5 mg/dL (17.6-36.0)   08/09/18  11:53    


 


Vitamin B12  224 pg/mL (239-931)  L  08/09/18  11:53    


 


Vitamin C  0.1 mg/dL (0.2-2.1)  L  08/10/18  09:29    


 


Urine Color  Yellow  (YELLOW)   08/09/18  11:53    


 


Urine Clarity  Clear  (Clear)   08/09/18  11:53    


 


Urine pH  5.0  (5.0-8.0)   08/09/18  11:53    


 


Ur Specific Gravity  1.014  (1.003-1.030)   08/09/18  11:53    


 


Urine Protein  Negative mg/dL (NEGATIVE)   08/09/18  11:53    


 


Urine Glucose (UA)  Normal mg/dL (Normal)   08/09/18  11:53    


 


Urine Ketones  Negative mg/dL (NEGATIVE)   08/09/18  11:53    


 


Urine Blood  Negative  (NEGATIVE)   08/09/18  11:53    


 


Urine Nitrate  Negative  (NEGATIVE)   08/09/18  11:53    


 


Urine Bilirubin  Negative  (NEGATIVE)   08/09/18  11:53    


 


Urine Urobilinogen  Normal mg/dL (0.2-1.0)   08/09/18  11:53    


 


Ur Leukocyte Esterase  Trace Corrine/uL (Negative)   08/09/18  11:53    


 


Urine WBC (Auto)  1 /hpf (0-5)   08/09/18  11:53    


 


Urine RBC (Auto)  < 1 /hpf (0-3)   08/09/18  11:53    














- Hospital Course


Hospital Course: 





72M with psh of Laparoscopic R hemicolectomy in May 2018 complicated by 

enterocutaneous fistula and post-operative bleeding requiring take backs.  Pt 

presents for chronic non-healing midline wound and complaints of general 

weakness and fatigue. Pt's wound has been managed out patient with local wound 

care with minimal progress. The inferior portion of the wound is now starting 

to drain sero/purulent fluid. The pt denies any fevers or chills. He has 

decreased appetite but is able to tolerate diet. He denies nausea and vomiting, 

he is having normal BM. He does have complaints of constant R thigh pain with 

no alleviating or aggravating factors which has been increasing in severity 

over past month.





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

, no obstruction, mural thickening of small bowel loops, encapsulated structure 

containing soft tissue suggestive of necrosis. Wound vac applied and ID 

consulted for antibiotic course. Patient clinically improved.





Patient is clear for discharge per ID and Surgery. Patient is to come to the 

infusion center for daily IV antibiotics for the first week, then start two 

weeks of oral antibiotics after. He will have Probiotics, GI prophylaxis, and 

nerve pain medication during that time. To reiterate:


Invanz 1 gm IVPB daily for 1 week at the infusion center


followed by Augmentin 875 by mouth twice a day for 2 weeks


Florastor 250mg by mouth at bedtime for the full three weeks


Protonix 40 by mouth daily for the full three weeks


Neurontin 300 by mouth three time a day for the full three weeks.


He is to follow up at the surgery clinic downstairs next Th, 8/23, with Dr. Villalobos to check on his wound vaccuum and antibiotic regimen. If any symptoms 

should return or worsen, please come back to the ED. Plan was discussed with 

the patient who understood and agreed.





This is a summary of the hospital course. For more details refer to the EMR.





- Date & Time of H&P


Date of H&P: 08/15/18


Time of H&P: 15:30





Discharge Exam





- Head Exam


Head Exam: ATRAUMATIC, NORMOCEPHALIC





- Eye Exam


Eye Exam: EOMI, Normal appearance





- ENT Exam


ENT Exam: Mucous Membranes Moist, Normal Exam





- Respiratory Exam


Respiratory Exam: Clear to PA & Lateral, NORMAL BREATHING PATTERN.  absent: 

Rales, Rhonchi





- Cardiovascular Exam


Cardiovascular Exam: +S1, +S2





- GI/Abdominal Exam


GI & Abdominal Exam: Normal Bowel Sounds, Soft.  absent: Firm, Rebound, Rigid, 

Tenderness


Additional comments: 





wound vac in place





- Extremities Exam


Extremities exam: normal capillary refill, pedal pulses present


Additional comments: 





PICC line in right upper arm





- Neurological Exam


Neurological exam: Alert, Normal Gait, Oriented x3, Reflexes Normal





- Psychiatric Exam


Psychiatric exam: Normal Affect, Normal Mood





- Skin


Skin Exam: Dry, Intact, Normal Color, Warm





Discharge Plan





- Discharge Medications


Prescriptions: 


Amoxicillin/Clavulanate [Augmentin 875 MG-125 MG] 1 tab PO BID #28 tab


Ertapenem [Invanz] 1 gm IVPB DAILY #7 pds


Gabapentin [Neurontin] 300 mg PO TID #60 cap


Pantoprazole Sodium [Protonix] 40 mg PO DAILY #21 tablet.dr Emiliano Horn [Florastor] 250 mg PO HS #21 cap





- Follow Up Plan


Condition: STABLE


Disposition: HOME/ ROUTINE


Instructions:  Enterocutaneous Fistula (DC), Negative Pressure Wound Therapy


Additional Instructions: 


Patient is clear for discharge per ID and Surgery. Patient is to come to the 

infusion center for daily IV antibiotics for the first week, then start two 

weeks of oral antibiotics after. He will have Probiotics, GI prophylaxis, and 

nerve pain medication during that time. To reiterate:


Invanz 1 gm IVPB daily for 1 week at the infusion center


followed by Augmentin 875 by mouth twice a day for 2 weeks


Florastor 250mg by mouth at bedtime for the full three weeks


Protonix 40 by mouth daily for the full three weeks


Neurontin 300 by mouth three time a day for the full three weeks.


He is to follow up at the surgery clinic downstairs next Thu, 8/23, with Dr. Villalobos to check on his wound vaccuum and antibiotic regimen. If any symptoms 

should return or worsen, please come back to the ED. Plan was discussed with 

the patient who understood and agreed.


Referrals: 


[facility] [Outside]

## 2018-08-19 ENCOUNTER — HOSPITAL ENCOUNTER (EMERGENCY)
Dept: HOSPITAL 31 - C.ER | Age: 73
Discharge: HOME | End: 2018-08-19
Payer: COMMERCIAL

## 2018-08-19 VITALS — OXYGEN SATURATION: 99 %

## 2018-08-19 VITALS — HEART RATE: 85 BPM | RESPIRATION RATE: 18 BRPM

## 2018-08-19 VITALS — SYSTOLIC BLOOD PRESSURE: 128 MMHG | TEMPERATURE: 98.3 F | DIASTOLIC BLOOD PRESSURE: 68 MMHG

## 2018-08-19 VITALS — BODY MASS INDEX: 30.7 KG/M2

## 2018-08-19 DIAGNOSIS — Z48.01: Primary | ICD-10-CM

## 2018-08-19 NOTE — C.PDOC
History Of Present Illness


73 yo male with PSH of hemicolectomy complicated by entercutaneous fistula 

admitted on 8/9/18 for nonhealing midline wound. He was discharged on 8/15/18 


with wound vacuum and instructed to come to ED for dressing changes. Last 

changed 4 days ago. Notes he feels well and presents no complaints. Denies fever

, redness, pain, change in diet or other complaints. 





PMH: right colon adenoma, EC fistula, DVT, ISMAEL


PSH: Right hemicolectomy, exploratomy laparotomy





Time Seen by Provider: 08/19/18 15:26


Chief Complaint (Nursing): Medical Clearance


History Per: Patient


History/Exam Limitations: no limitations


Onset/Duration Of Symptoms: Days


Current Symptoms Are (Timing): Still Present





Past Medical History


Vital Signs: 


 Last Vital Signs











Temp  98.3 F   08/19/18 16:43


 


Pulse  85   08/19/18 16:43


 


Resp  18   08/19/18 16:43


 


BP  128/68   08/19/18 16:43


 


Pulse Ox  100   08/19/18 16:43














- Medical History


PMH: Arthritis, Colonic Polyps, Deep Vein Thrombosis ("one year ago" as per 

patient), Gastritis, Sleep Apnea (POSITIVE STUDY (last year))


   Denies: Chronic Kidney Disease


Surgical History: Endoscopy





- [facility] Procedures








 (05/31/18)


DRAINAGE OF ABD WALL WITH DRAIN DEV, PERC ENDO APPROACH (05/31/18)


EXCISION OF ABD SUBCU/FASCIA, OPEN APPROACH (05/31/18)


EXCISION OF LARGE INTESTINE, OPEN APPROACH (05/31/18)


INSERTION OF INFUSION DEV INTO SUP VENA CAVA, PERC APPROACH (08/09/18)


INTRODUCTION OF NUTRITIONAL INTO CENTRAL VEIN, PERC APPROACH (08/09/18)


INTRODUCTION OF NUTRITIONAL INTO PERIPH VEIN, PERC APPROACH (05/31/18)


REPAIR MESENTERY, PERCUTANEOUS ENDOSCOPIC APPROACH (05/31/18)


RESECTION OF RIGHT LARGE INTESTINE, PERC ENDO APPROACH (05/31/18)








Family History: States: Unknown Family Hx





- Social History


Hx Alcohol Use: Yes


Hx Substance Use: No





- Immunization History


Hx Tetanus Toxoid Vaccination: No


Hx Influenza Vaccination: No


Hx Pneumococcal Vaccination: Yes





Review Of Systems


Constitutional: Negative for: Fever


Cardiovascular: Negative for: Chest Pain


Respiratory: Negative for: Shortness of Breath


Gastrointestinal: Negative for: Nausea, Vomiting, Abdominal Pain, Hematochezia, 

Hematemesis





Physical Exam





- Physical Exam


Appears: Well, Non-toxic, No Acute Distress


Skin: Warm, Dry, Other ((+) midline wound vac intact )


Head: Normacephalic


Eye(s): bilateral: Normal Inspection, EOMI


Nose: Normal


Oral Mucosa: Moist


Neck: Normal, Normal ROM, Supple


Chest: Symmetrical


Respiratory: No Accessory Muscle Use


Gastrointestinal/Abdominal: Normal Exam, Soft, No Tenderness


Back: Normal Inspection


Extremity: Normal ROM


Neurological/Psych: Oriented x3, Normal Speech





ED Course And Treatment


O2 Sat by Pulse Oximetry: 99


Progress Note: Pt was seen and evaluated by surgical resident, Dr Waterman who 

changes wound vac. Pt was instructed to follow up with the surgical clinic on 8/ 23/18 as scheduled.





Disposition





- Disposition


Disposition: HOME/ ROUTINE


Disposition Time: 16:06


Condition: STABLE


Additional Instructions: 


Follow up with the clinic on 8/23/18. Return to ER if symptoms persist or 

worsen.


Instructions:  Negative Pressure Wound Therapy


Forms:  [facility] (English)


Print Language: Syriac





- Clinical Impression


Clinical Impression: 


 Encounter for management of wound VAC

## 2018-08-19 NOTE — CP.PCM.CON
History of Present Illness





- History of Present Illness


History of Present Illness: 





Surgery: Dr. Villalobos





CC: Wound vac change





HPI: 72M well known to service w. hx of chonic non-healing midline wound 

presents to ED for wound vac change. Pt has no complaints.





PMH: right colon adenoma, EC fistula, DVT, ISMAEL


PSH: Right hemicolectomy, exploratomy laparotomy


Meds: MAR reviewed


Social: denies tobacco, denies alcohol use


Allergies: Iodine


Family hx: non-contributory








Review of Systems





- Review of Systems


All systems: reviewed and no additional remarkable complaints except (hpi)





Past Patient History





- Past Medical History & Family History


Past Medical History?: Yes





- Past Social History


Smoking Status: Former Smoker





- PULMONARY


Hx Sleep Apnea: Yes (POSITIVE STUDY (last year))





- NEUROLOGICAL


Hx Neurological Disorder: No





- HEENT


Hx HEENT Problems: No





- RENAL


Hx Chronic Kidney Disease: No





- ENDOCRINE/METABOLIC


Hx Endocrine Disorders: No





- HEMATOLOGICAL/ONCOLOGICAL


Hx Blood Disorders: No





- INTEGUMENTARY


Hx Dermatological Problems: Yes


Other/Comment: INFECTED SURGICAL WOUND with wound vac





- MUSCULOSKELETAL/RHEUMATOLOGICAL


Hx Arthritis: Yes





- GASTROINTESTINAL


Hx Gastritis: Yes





- GENITOURINARY/GYNECOLOGICAL


Hx Genitourinary Disorders: No





- PSYCHIATRIC


Hx Substance Use: No





- SURGICAL HISTORY


Hx Surgeries: Yes


Other/Comment: bowel resection





- ANESTHESIA


Hx Anesthesia: Yes


Hx Anesthesia Reactions: No


Hx Malignant Hyperthermia: No





Meds


Allergies/Adverse Reactions: 


 Allergies











Allergy/AdvReac Type Severity Reaction Status Date / Time


 


iodine Allergy Severe ANAPHYLAXIS Verified 08/19/18 14:51














Physical Exam





- Constitutional


Appears: Non-toxic, No Acute Distress





- Head Exam


Head Exam: ATRAUMATIC, NORMOCEPHALIC





- Eye Exam


Eye Exam: EOMI





- ENT Exam


ENT Exam: Mucous Membranes Moist





- Neck Exam


Neck exam: Positive for: Full Rom





- Respiratory Exam


Respiratory Exam: NORMAL BREATHING PATTERN.  absent: Accessory Muscle Use, 

Respiratory Distress





- GI/Abdominal Exam


GI & Abdominal Exam: Soft.  absent: Distended, Firm, Guarding, Rebound, Rigid, 

Tenderness


Additional comments: 





midline wound 8x0.5cm good grannulation tissue in center, small amounts of tan 

fibrin at wound base, no odor, wound at umbilicus pretty much healed





- Extremities Exam


Extremities exam: Negative for: calf tenderness, pedal edema





- Neurological Exam


Neurological exam: Alert, Oriented x3





Results





- Vital Signs


Recent Vital Signs: 


 Last Vital Signs











Temp  98.1 F   08/19/18 14:52


 


Pulse  85   08/19/18 14:52


 


Resp  18   08/19/18 14:52


 


BP  112/73   08/19/18 14:52


 


Pulse Ox  99   08/19/18 16:07














Assessment & Plan





- Assessment and Plan (Free Text)


Assessment: 





72M w. chronic midline abd wound presenting for wound vac change, wound improved


-Pt to follow up in clinic this thursday 8/23


-d/w attending





[place] PGY4

## 2018-08-26 ENCOUNTER — HOSPITAL ENCOUNTER (EMERGENCY)
Dept: HOSPITAL 31 - C.ER | Age: 73
Discharge: HOME | End: 2018-08-26
Payer: COMMERCIAL

## 2018-08-26 VITALS — BODY MASS INDEX: 30.7 KG/M2

## 2018-08-26 VITALS
HEART RATE: 90 BPM | OXYGEN SATURATION: 96 % | SYSTOLIC BLOOD PRESSURE: 133 MMHG | DIASTOLIC BLOOD PRESSURE: 85 MMHG | TEMPERATURE: 98 F | RESPIRATION RATE: 16 BRPM

## 2018-08-26 DIAGNOSIS — Z48.01: Primary | ICD-10-CM

## 2018-08-26 NOTE — C.PDOC
History Of Present Illness





71 yo male with PSH of hemicolectomy complicated by entercutaneous fistula 

admitted on 8/9/18 for nonhealing midline wound. He was discharged on 8/15/18 


with wound vacuum and instructed to come to ED for dressing changes. Last 

changed on 8/19. Notes he feels well and presents no complaints. Denies fever, 

redness, pain, change in diet or other complaints


Chief Complaint (Nursing): Wound Check


History Per: Patient


History/Exam Limitations: no limitations





Past Medical History


Reviewed: Historical Data, Nursing Documentation, Vital Signs


Vital Signs: 


 Last Vital Signs











Temp  98.0 F   08/26/18 10:17


 


Pulse  90   08/26/18 10:17


 


Resp  16   08/26/18 10:17


 


BP  133/85   08/26/18 10:17


 


Pulse Ox  96   08/26/18 13:43














- Medical History


PMH: Arthritis, Colonic Polyps, Deep Vein Thrombosis ("one year ago" as per 

patient), Gastritis, Sleep Apnea (POSITIVE STUDY (last year))


   Denies: Chronic Kidney Disease


Surgical History: Endoscopy





- [facility] Procedures








 (05/31/18)


DRAINAGE OF ABD WALL WITH DRAIN DEV, PERC ENDO APPROACH (05/31/18)


EXCISION OF ABD SUBCU/FASCIA, OPEN APPROACH (05/31/18)


EXCISION OF LARGE INTESTINE, OPEN APPROACH (05/31/18)


INSERTION OF INFUSION DEV INTO SUP VENA CAVA, PERC APPROACH (08/09/18)


INTRODUCTION OF NUTRITIONAL INTO CENTRAL VEIN, PERC APPROACH (08/09/18)


INTRODUCTION OF NUTRITIONAL INTO PERIPH VEIN, PERC APPROACH (05/31/18)


REPAIR MESENTERY, PERCUTANEOUS ENDOSCOPIC APPROACH (05/31/18)


RESECTION OF RIGHT LARGE INTESTINE, PERC ENDO APPROACH (05/31/18)








Family History: States: No Known Family Hx





- Social History


Hx Alcohol Use: No


Hx Substance Use: No





- Immunization History


Hx Tetanus Toxoid Vaccination: No


Hx Influenza Vaccination: No


Hx Pneumococcal Vaccination: Yes





Review Of Systems


Constitutional: Negative for: Fever, Chills


Gastrointestinal: Negative for: Nausea, Vomiting


Neurological: Negative for: Weakness, Numbness





Physical Exam





- Physical Exam


Appears: Non-toxic, No Acute Distress


Skin: Normal Color, Warm, Dry, No Rash, Other (midline wound vac intact)


Head: Atraumatic


Eye(s): bilateral: Normal Inspection


Nose: Normal


Oral Mucosa: Moist


Lips: Normal Appearing


Neck: Normal ROM


Cardiovascular: Rhythm Regular, No Murmur


Respiratory: Normal Breath Sounds, No Accessory Muscle Use


Extremity: Normal ROM, No Deformity, No Swelling


Neurological/Psych: Oriented x3, Normal Speech





ED Course And Treatment


O2 Sat by Pulse Oximetry: 96


Pulse Ox Interpretation: Normal (RA)


Progress Note: Paged surgery resident who was scribed in OR. Called  

who sts if patient can't wait, can be d/c and instructed to return tomorrow 

morning at 9 am. Patient preferred to be d/c home today. Patient has no 

complains on discharge.





Disposition





- Disposition


Referrals: 


Jas Villalobos MD [Staff Provider] - 


Disposition: HOME/ ROUTINE


Disposition Time: 13:40


Condition: STABLE


Additional Instructions: 


Return tomorrow at 9 am to be seen by surgery specialist.


Instructions:  Wound Care (DC)


Forms:  [facility] (English)





- Clinical Impression


Clinical Impression: 


 Encounter for postoperative wound check








- Scribe Statement


The provider has reviewed the documentation as recorded by the Scribe (Sharron Chavarria)








All medical record entries made by the Scribe were at my direction and 

personally dictated by me. I have reviewed the chart and agree that the record 

accurately reflects my personal performance of the history, physical exam, 

medical decision making, and the department course for this patient. I have 

also personally directed, reviewed, and agree with the discharge instructions 

and disposition.

## 2018-08-27 ENCOUNTER — HOSPITAL ENCOUNTER (EMERGENCY)
Dept: HOSPITAL 31 - C.ER | Age: 73
Discharge: HOME | End: 2018-08-27
Payer: COMMERCIAL

## 2018-08-27 VITALS — OXYGEN SATURATION: 99 % | RESPIRATION RATE: 18 BRPM

## 2018-08-27 VITALS — HEART RATE: 76 BPM | SYSTOLIC BLOOD PRESSURE: 134 MMHG | DIASTOLIC BLOOD PRESSURE: 86 MMHG | TEMPERATURE: 98.3 F

## 2018-08-27 VITALS — BODY MASS INDEX: 30.7 KG/M2

## 2018-08-27 DIAGNOSIS — Z48.01: Primary | ICD-10-CM

## 2018-08-27 NOTE — C.PDOC
History Of Present Illness


73 y/o male presents to ED for wound vac change. Patient seen few days ago for 

same  and was told to return to ED for dressing change by surgical resident. 

Patient reports no fever, vomiting, diarrhea or any other complaints at this 

time. 


Time Seen by Provider: 08/27/18 10:28


Chief Complaint (Nursing): Abnormal Skin Integrity


History Per: Patient


History/Exam Limitations: no limitations


Onset/Duration Of Symptoms: Days


Current Symptoms Are (Timing): Still Present





Past Medical History


Reviewed: Historical Data, Nursing Documentation, Vital Signs


Vital Signs: 


 Last Vital Signs











Temp  98.3 F   08/27/18 14:38


 


Pulse  76   08/27/18 14:38


 


Resp  18   08/27/18 14:38


 


BP  134/86   08/27/18 14:38


 


Pulse Ox  99   08/27/18 16:30














- Medical History


PMH: Arthritis, Colonic Polyps, Deep Vein Thrombosis ("one year ago" as per 

patient), Gastritis, Sleep Apnea (POSITIVE STUDY (last year))


Surgical History: Endoscopy





- [facility] Procedures








 (05/31/18)


DRAINAGE OF ABD WALL WITH DRAIN DEV, PERC ENDO APPROACH (05/31/18)


EXCISION OF ABD SUBCU/FASCIA, OPEN APPROACH (05/31/18)


EXCISION OF LARGE INTESTINE, OPEN APPROACH (05/31/18)


INSERTION OF INFUSION DEV INTO SUP VENA CAVA, PERC APPROACH (08/09/18)


INTRODUCTION OF NUTRITIONAL INTO CENTRAL VEIN, PERC APPROACH (08/09/18)


INTRODUCTION OF NUTRITIONAL INTO PERIPH VEIN, PERC APPROACH (05/31/18)


REPAIR MESENTERY, PERCUTANEOUS ENDOSCOPIC APPROACH (05/31/18)


RESECTION OF RIGHT LARGE INTESTINE, PERC ENDO APPROACH (05/31/18)








Family History: States: No Known Family Hx





- Social History


Hx Alcohol Use: No


Hx Substance Use: No





- Immunization History


Hx Tetanus Toxoid Vaccination: No


Hx Influenza Vaccination: No


Hx Pneumococcal Vaccination: Yes





Review Of Systems


Constitutional: Negative for: Fever, Chills


Gastrointestinal: Positive for: Abdominal Pain.  Negative for: Nausea, Vomiting

, Diarrhea


Skin: Negative for: Rash





Physical Exam





- Physical Exam


Appears: Non-toxic, No Acute Distress


Skin: Normal Color, Dry, No Rash


Head: Atraumatic, Normacephalic


Eye(s): bilateral: Normal Inspection


Oral Mucosa: Moist


Cardiovascular: Rhythm Regular


Respiratory: Normal Breath Sounds, No Rales, No Rhonchi, No Wheezing


Gastrointestinal/Abdominal: Soft, No Tenderness, No Guarding, No Rebound, Other 

(wound vac on mid abdomen non erythematous and not warm to touch)


Back: No CVA Tenderness


Neurological/Psych: Oriented x3, Normal Speech





ED Course And Treatment


O2 Sat by Pulse Oximetry: 99 (RA)


Pulse Ox Interpretation: Normal


Progress Note: Surgical resident changed dresing, patient tolerated well and 

discharged with follow up in 3 days.





Disposition





- Disposition


Disposition: HOME/ ROUTINE


Disposition Time: 14:20


Condition: GOOD


Additional Instructions: 





ANA MARIA FIGUEROA, thank you for letting us take care of you today. Your 

provider was Blair Logan DO and you were treated for FOLLOW UP. The 

emergency medical care you received today was directed at your acute symptoms. 

If you were prescribed any medication, please fill it and take as directed. It 

may take several days for your symptoms to resolve. Return to the Emergency 

Department if your symptoms worsen, do not improve, or if you have any other 

problems.





Please contact your doctor or call one of the physicians/clinics you have been 

referred to that are listed on the Patient Visit Information form that is 

included in your discharge packet. Bring any paperwork you were given at 

discharge with you along with any medications you are taking to your follow up 

visit. Our treatment cannot replace ongoing medical care by a primary care 

provider outside of the emergency department.





Thank you for allowing the [facility] team to be part of your care today.














Follow up here in the emergency room in 3 days for wound care.


Instructions:  Surgical Wound (DC)


Forms:  [facility] (English)





- Clinical Impression


Clinical Impression: 


 Encounter for postoperative wound check








- Scribe Statement


The provider has reviewed the documentation as recorded by the Danielleibfelisha Kraus





All medical record entries made by the Scribe were at my direction and 

personally dictated by me. I have reviewed the chart and agree that the record 

accurately reflects my personal performance of the history, physical exam, 

medical decision making, and the department course for this patient. I have 

also personally directed, reviewed, and agree with the discharge instructions 

and disposition.

## 2018-08-27 NOTE — CP.PCM.CON
History of Present Illness





- History of Present Illness


History of Present Illness: 


PGY-1 Surgery consult note for Dr. YUNIOR Villalobos





CC: Wound vac change





HPI: 72M with  pmhx of chonic midline wound presents to ED for wound vac 

change. Patient came to ED yesterday 08/26 but decided to leave and to come 

back today for wound change. Last change was 08/19. Patient has no current 

complains today. Pt denies pain in wound area, fevers, chills or bloody and/or 

purulent drainage from site. 





PMH: right colon adenoma, EC fistula, DVT, ISMAEL


PSH: Right hemicolectomy, exploratomy laparotomy


Meds: MAR reviewed


Social: former smoker (1 pack/day x 30 years). Quitted smoking 15 years ago. Pt 

denies tobacco, denies alcohol use and illicit drug use. 


Allergies: Iodine (dizziness)


Family hx: diabetes (father)











Review of Systems





- Review of Systems


All systems: reviewed and no additional remarkable complaints except


Review of Systems: 


hpi








Past Patient History





- Past Medical History & Family History


Past Medical History?: Yes





- Past Social History


Smoking Status: Former Smoker





- PULMONARY


Hx Sleep Apnea: Yes (POSITIVE STUDY (last year))





- NEUROLOGICAL


Hx Neurological Disorder: No





- HEENT


Hx HEENT Problems: No





- RENAL


Hx Chronic Kidney Disease: No





- ENDOCRINE/METABOLIC


Hx Endocrine Disorders: No





- HEMATOLOGICAL/ONCOLOGICAL


Hx Blood Disorders: No





- INTEGUMENTARY


Hx Dermatological Problems: Yes


Other/Comment: INFECTED SURGICAL WOUND with wound vac





- MUSCULOSKELETAL/RHEUMATOLOGICAL


Hx Arthritis: Yes





- GASTROINTESTINAL


Hx Gastritis: Yes





- GENITOURINARY/GYNECOLOGICAL


Hx Genitourinary Disorders: No





- PSYCHIATRIC


Hx Psychophysiologic Disorder: No


Hx Substance Use: No





- SURGICAL HISTORY


Other/Comment: ABD SX





- ANESTHESIA


Hx Anesthesia: Yes


Hx Anesthesia Reactions: No


Hx Malignant Hyperthermia: No





Meds


Allergies/Adverse Reactions: 


 Allergies











Allergy/AdvReac Type Severity Reaction Status Date / Time


 


iodine Allergy Severe ANAPHYLAXIS Verified 08/27/18 10:25














Physical Exam





- Constitutional


Appears: Non-toxic, No Acute Distress





- Head Exam


Head Exam: ATRAUMATIC, NORMOCEPHALIC





- Eye Exam


Eye Exam: EOMI, Normal appearance





- ENT Exam


ENT Exam: Mucous Membranes Moist





- Neck Exam


Neck exam: Positive for: Normal Inspection





- Respiratory Exam


Respiratory Exam: NORMAL BREATHING PATTERN.  absent: Accessory Muscle Use, 

Respiratory Distress





- GI/Abdominal Exam


GI & Abdominal Exam: Soft.  absent: Distended, Firm, Guarding, Rebound, 

Tenderness


Additional comments: 


8x 0.5 cm midline abdominal wound. Good granulation tissue in center. tan 

fibrin in lower aspect of wound and left lateral inner side of wound, non 

odorous, non purulent. wound in umbilicus healed and covered with dry dressing, 

clean, nonbloody, nonpurulent. 








- Neurological Exam


Neurological exam: Alert, Oriented x3





- Psychiatric Exam


Psychiatric exam: Normal Affect, Normal Mood





Results





- Vital Signs


Recent Vital Signs: 


 Last Vital Signs











Temp  98.3 F   08/27/18 14:38


 


Pulse  76   08/27/18 14:38


 


Resp  18   08/27/18 14:38


 


BP  134/86   08/27/18 14:38


 


Pulse Ox  99   08/27/18 14:38














Assessment & Plan





- Assessment and Plan (Free Text)


Assessment: 





72 year old make with chronic midline abd wound presenting for wound change 

every 3 days


Plan: 


- Patient to return to ED in 72 hours (08/30) for wound vac change. 


- Patient's records from [place] were given to Medical records to be added 

to patient's EMR


- As per Dr. Villalobos, no imaging needed at this time. 





Plan was discussed with Dr YUNIOR Valle PGY-1 








- Date & Time


Date: 08/27/18


Time: 10:30

## 2018-08-30 ENCOUNTER — HOSPITAL ENCOUNTER (EMERGENCY)
Dept: HOSPITAL 31 - C.ER | Age: 73
Discharge: HOME | End: 2018-08-30
Payer: COMMERCIAL

## 2018-08-30 VITALS
SYSTOLIC BLOOD PRESSURE: 142 MMHG | OXYGEN SATURATION: 96 % | RESPIRATION RATE: 18 BRPM | HEART RATE: 97 BPM | DIASTOLIC BLOOD PRESSURE: 77 MMHG | TEMPERATURE: 98.1 F

## 2018-08-30 VITALS — BODY MASS INDEX: 30.7 KG/M2

## 2018-08-30 DIAGNOSIS — Z48.01: Primary | ICD-10-CM

## 2018-08-30 DIAGNOSIS — Z87.891: ICD-10-CM

## 2018-08-30 NOTE — CP.PCM.CON
History of Present Illness





- History of Present Illness


History of Present Illness: 





Surger: Dr. Villalobos





CC: wound check





HPI: 72M well known to service presents for evaluation of chronic wound. 





Review of Systems





- Review of Systems


All systems: reviewed and no additional remarkable complaints except (HPI)





Past Patient History





- Past Medical History & Family History


Past Medical History?: Yes





- Past Social History


Smoking Status: Former Smoker





- PULMONARY


Hx Sleep Apnea: Yes (POSITIVE STUDY (last year))





- NEUROLOGICAL


Hx Neurological Disorder: No





- HEENT


Hx HEENT Problems: No





- RENAL


Hx Chronic Kidney Disease: No





- ENDOCRINE/METABOLIC


Hx Endocrine Disorders: No





- HEMATOLOGICAL/ONCOLOGICAL


Hx Blood Disorders: No





- INTEGUMENTARY


Hx Dermatological Problems: Yes


Other/Comment: INFECTED SURGICAL WOUND with wound vac





- MUSCULOSKELETAL/RHEUMATOLOGICAL


Hx Arthritis: Yes





- GASTROINTESTINAL


Hx Gastritis: Yes





- GENITOURINARY/GYNECOLOGICAL


Hx Genitourinary Disorders: No





- PSYCHIATRIC


Hx Substance Use: No





- SURGICAL HISTORY


Hx Surgeries: Yes





- ANESTHESIA


Hx Anesthesia: Yes


Hx Anesthesia Reactions: No


Hx Malignant Hyperthermia: No





Meds


Allergies/Adverse Reactions: 


 Allergies











Allergy/AdvReac Type Severity Reaction Status Date / Time


 


iodine Allergy Severe ANAPHYLAXIS Verified 08/30/18 16:14














Physical Exam





- Constitutional


Appears: Non-toxic, No Acute Distress





- Head Exam


Head Exam: ATRAUMATIC, NORMOCEPHALIC





- Eye Exam


Eye Exam: EOMI





- ENT Exam


ENT Exam: Mucous Membranes Moist





- Neck Exam


Neck exam: Positive for: Full Rom





- Respiratory Exam


Respiratory Exam: NORMAL BREATHING PATTERN.  absent: Accessory Muscle Use, 

Respiratory Distress





- GI/Abdominal Exam


Additional comments: 





midline wound 8x0.3cm good grannulation tissue in center, beefy red base





- Extremities Exam


Extremities exam: Negative for: calf tenderness, pedal edema





- Neurological Exam


Neurological exam: Alert, Oriented x3





Results





- Vital Signs


Recent Vital Signs: 


 Last Vital Signs











Temp  98.1 F   08/30/18 16:12


 


Pulse  97 H  08/30/18 16:12


 


Resp  18   08/30/18 16:12


 


BP  142/77   08/30/18 16:12


 


Pulse Ox  96   08/30/18 16:58














Assessment & Plan





- Assessment and Plan (Free Text)


Assessment: 





72M w. chronic midline wound


-daily wet to dry dressing changes


-F/U w. Dr. Villalobos next thursday


-d/w attending





[facility] PGY4

## 2018-08-30 NOTE — C.PDOC
History Of Present Illness


72 year old male presents to ER for wound vac change. Patient seen few days ago 

for same  and was told to return to ED for dressing change by surgical 

resident. Patient reports no fever, vomiting, diarrhea or any other complaints 

at this time. 





Time Seen by Provider: 08/30/18 16:20


Chief Complaint (Nursing): Wound Check


History Per: Patient


History/Exam Limitations: no limitations


Onset/Duration Of Symptoms: Days Ago


Current Symptoms Are (Timing): Still Present


Location Of Injury: Anterior: Abdomen


Recent travel outside of the [place]: No





Past Medical History


Reviewed: Historical Data, Nursing Documentation, Vital Signs


Vital Signs: 


 Last Vital Signs











Temp  98.1 F   08/30/18 16:12


 


Pulse  97 H  08/30/18 16:12


 


Resp  18   08/30/18 16:12


 


BP  142/77   08/30/18 16:12


 


Pulse Ox  96   08/30/18 16:50














- Medical History


PMH: Arthritis, Colonic Polyps, Deep Vein Thrombosis ("one year ago" as per 

patient), Gastritis, Sleep Apnea (POSITIVE STUDY (last year))


   Denies: Chronic Kidney Disease


Surgical History: Endoscopy





- [facility] Procedures








 (05/31/18)


DRAINAGE OF ABD WALL WITH DRAIN DEV, PERC ENDO APPROACH (05/31/18)


EXCISION OF ABD SUBCU/FASCIA, OPEN APPROACH (05/31/18)


EXCISION OF LARGE INTESTINE, OPEN APPROACH (05/31/18)


INSERTION OF INFUSION DEV INTO SUP VENA CAVA, PERC APPROACH (08/09/18)


INTRODUCTION OF NUTRITIONAL INTO CENTRAL VEIN, PERC APPROACH (08/09/18)


INTRODUCTION OF NUTRITIONAL INTO PERIPH VEIN, PERC APPROACH (05/31/18)


REPAIR MESENTERY, PERCUTANEOUS ENDOSCOPIC APPROACH (05/31/18)


RESECTION OF RIGHT LARGE INTESTINE, PERC ENDO APPROACH (05/31/18)








Family History: States: Unknown Family Hx





- Social History


Hx Alcohol Use: No


Hx Substance Use: No





- Immunization History


Hx Tetanus Toxoid Vaccination: No


Hx Influenza Vaccination: No


Hx Pneumococcal Vaccination: Yes





Review Of Systems


Constitutional: Negative for: Fever


Gastrointestinal: Positive for: Abdominal Pain.  Negative for: Vomiting, 

Diarrhea


Skin: Positive for: Other (Wound vac)





Physical Exam





- Physical Exam


Appears: Non-toxic


Skin: Warm, Dry


Head: Atraumatic, Normacephalic


Eye(s): bilateral: Normal Inspection


Oral Mucosa: Moist


Chest: Symmetrical, No Tenderness


Cardiovascular: Rhythm Regular


Respiratory: Normal Breath Sounds, No Rales, No Rhonchi, No Wheezing


Gastrointestinal/Abdominal: Soft, No Tenderness, Other (wound vac on mid 

abdomen non erythematous and not warm to touch)


Back: No CVA Tenderness


Neurological/Psych: Oriented x3, Normal Speech





ED Course And Treatment


O2 Sat by Pulse Oximetry: 96 (Room air)


Pulse Ox Interpretation: Normal





Medical Decision Making


Medical Decision Making: 


Surgical resident Kane evaluated the patient and discussed case with surgeon 

Dr Villalobos and they removed the wound vac. Dressing was applied. patient 

instructed to do wet to dry dressings daily and expressed understanding. They 

will now follow up with Dr Villalobos in his office. 





Disposition


Counseled Patient/Family Regarding: Diagnosis, Need For Followup





- Disposition


Referrals: 


Jas Villalobos MD [Staff Provider] - 


Disposition: HOME/ ROUTINE


Disposition Time: 16:49


Condition: GOOD


Additional Instructions: 


Cambiar el vendaje diariamente hmedo a seco


seguimiento con el cirusunny Villalobos


Instructions:  Wound Care (DC)


Print Language: Chinese





- POA


Present On Arrival: None





- Clinical Impression


Clinical Impression: 


 Encounter for postoperative wound check, Encounter for management of wound VAC








- PA / NP / Resident Statement


MD/DO has reviewed & agrees with the documentation as recorded.





- Scribe Statement


The provider has reviewed the documentation as recorded by the Scribfelisha Sen





All medical record entries made by the Scribe were at my direction and 

personally dictated by me. I have reviewed the chart and agree that the record 

accurately reflects my personal performance of the history, physical exam, 

medical decision making, and the department course for this patient. I have 

also personally directed, reviewed, and agree with the discharge instructions 

and disposition.

## 2024-09-06 NOTE — RAD
Date of service: 



08/10/2018



HISTORY:

PICC Insertion  



COMPARISON:

8/9/2018 



FINDINGS:



LUNGS:

No active pulmonary disease.



PLEURA:

No significant pleural effusion identified, no pneumothorax apparent.



CARDIOVASCULAR:

New right PICC catheter terminating in the region of superior vena 

cava.



OSSEOUS STRUCTURES:

No significant abnormalities.



VISUALIZED UPPER ABDOMEN:

Normal.



OTHER FINDINGS:

None.



IMPRESSION:

New right PICC catheter terminating in the region of the superior 

vena cava.  Otherwise unremarkable. Family